# Patient Record
Sex: FEMALE | Race: WHITE | NOT HISPANIC OR LATINO | Employment: FULL TIME | URBAN - METROPOLITAN AREA
[De-identification: names, ages, dates, MRNs, and addresses within clinical notes are randomized per-mention and may not be internally consistent; named-entity substitution may affect disease eponyms.]

---

## 2017-11-28 ENCOUNTER — HOSPITAL ENCOUNTER (OUTPATIENT)
Dept: OTHER | Facility: HOSPITAL | Age: 37
Discharge: HOME | End: 2017-11-28

## 2023-08-17 ENCOUNTER — HOSPITAL ENCOUNTER (OUTPATIENT)
Dept: OTHER | Facility: HOSPITAL | Age: 43
Discharge: HOME | End: 2023-08-17

## 2023-08-29 ENCOUNTER — HOSPITAL ENCOUNTER (OUTPATIENT)
Dept: OTHER | Facility: HOSPITAL | Age: 43
Discharge: HOME | End: 2023-08-29

## 2023-10-12 ENCOUNTER — LAB (OUTPATIENT)
Dept: LAB | Facility: HOSPITAL | Age: 43
End: 2023-10-12
Payer: COMMERCIAL

## 2023-10-12 ENCOUNTER — OFFICE VISIT (OUTPATIENT)
Dept: FAMILY MEDICINE CLINIC | Facility: CLINIC | Age: 43
End: 2023-10-12
Payer: COMMERCIAL

## 2023-10-12 ENCOUNTER — HOSPITAL ENCOUNTER (OUTPATIENT)
Dept: GENERAL RADIOLOGY | Facility: HOSPITAL | Age: 43
Discharge: HOME | End: 2023-10-12
Payer: COMMERCIAL

## 2023-10-12 VITALS
SYSTOLIC BLOOD PRESSURE: 109 MMHG | WEIGHT: 122 LBS | DIASTOLIC BLOOD PRESSURE: 71 MMHG | HEART RATE: 82 BPM | HEIGHT: 62 IN | OXYGEN SATURATION: 99 % | BODY MASS INDEX: 22.45 KG/M2

## 2023-10-12 DIAGNOSIS — R35.0 URINE FREQUENCY: ICD-10-CM

## 2023-10-12 DIAGNOSIS — R10.84 GENERALIZED ABDOMINAL PAIN: ICD-10-CM

## 2023-10-12 DIAGNOSIS — K92.1 BLOODY STOOL: ICD-10-CM

## 2023-10-12 DIAGNOSIS — M54.40 LOW BACK PAIN WITH SCIATICA, SCIATICA LATERALITY UNSPECIFIED, UNSPECIFIED BACK PAIN LATERALITY, UNSPECIFIED CHRONICITY: ICD-10-CM

## 2023-10-12 DIAGNOSIS — R15.2 RECTAL URGENCY: ICD-10-CM

## 2023-10-12 DIAGNOSIS — K92.1 BLOODY STOOL: Primary | ICD-10-CM

## 2023-10-12 DIAGNOSIS — Z23 NEED FOR IMMUNIZATION AGAINST INFLUENZA: ICD-10-CM

## 2023-10-12 PROBLEM — E03.9 HYPOTHYROIDISM: Status: ACTIVE | Noted: 2023-10-12

## 2023-10-12 PROBLEM — Z85.42 HISTORY OF UTERINE CANCER: Status: ACTIVE | Noted: 2023-10-12

## 2023-10-12 PROBLEM — K21.9 GASTROESOPHAGEAL REFLUX DISEASE: Status: ACTIVE | Noted: 2023-10-12

## 2023-10-12 PROBLEM — J30.9 ALLERGIC RHINITIS: Status: ACTIVE | Noted: 2023-10-12

## 2023-10-12 LAB
ALBUMIN SERPL-MCNC: 4.3 G/DL (ref 3.5–5.2)
ALBUMIN/GLOB SERPL: 1.7 G/DL
ALP SERPL-CCNC: 104 U/L (ref 39–117)
ALT SERPL W P-5'-P-CCNC: 17 U/L (ref 1–33)
ANION GAP SERPL CALCULATED.3IONS-SCNC: 10 MMOL/L (ref 5–15)
AST SERPL-CCNC: 23 U/L (ref 1–32)
BASOPHILS # BLD AUTO: 0.06 10*3/MM3 (ref 0–0.2)
BASOPHILS NFR BLD AUTO: 1 % (ref 0–1.5)
BILIRUB BLD-MCNC: NEGATIVE MG/DL
BILIRUB SERPL-MCNC: 0.2 MG/DL (ref 0–1.2)
BUN SERPL-MCNC: 11 MG/DL (ref 6–20)
BUN/CREAT SERPL: 12.8 (ref 7–25)
CALCIUM SPEC-SCNC: 9.4 MG/DL (ref 8.6–10.5)
CHLORIDE SERPL-SCNC: 105 MMOL/L (ref 98–107)
CLARITY, POC: CLEAR
CO2 SERPL-SCNC: 26 MMOL/L (ref 22–29)
COLOR UR: YELLOW
CREAT SERPL-MCNC: 0.86 MG/DL (ref 0.57–1)
DEPRECATED RDW RBC AUTO: 40 FL (ref 37–54)
EGFRCR SERPLBLD CKD-EPI 2021: 85.6 ML/MIN/1.73
EOSINOPHIL # BLD AUTO: 0.23 10*3/MM3 (ref 0–0.4)
EOSINOPHIL NFR BLD AUTO: 3.7 % (ref 0.3–6.2)
ERYTHROCYTE [DISTWIDTH] IN BLOOD BY AUTOMATED COUNT: 12.1 % (ref 12.3–15.4)
EXPIRATION DATE: NORMAL
GLOBULIN UR ELPH-MCNC: 2.5 GM/DL
GLUCOSE SERPL-MCNC: 108 MG/DL (ref 65–99)
GLUCOSE UR STRIP-MCNC: NEGATIVE MG/DL
HCT VFR BLD AUTO: 36.9 % (ref 34–46.6)
HGB BLD-MCNC: 12.2 G/DL (ref 12–15.9)
IMM GRANULOCYTES # BLD AUTO: 0.01 10*3/MM3 (ref 0–0.05)
IMM GRANULOCYTES NFR BLD AUTO: 0.2 % (ref 0–0.5)
KETONES UR QL: NEGATIVE
LEUKOCYTE EST, POC: NEGATIVE
LYMPHOCYTES # BLD AUTO: 1.57 10*3/MM3 (ref 0.7–3.1)
LYMPHOCYTES NFR BLD AUTO: 25.4 % (ref 19.6–45.3)
Lab: NORMAL
MCH RBC QN AUTO: 29.9 PG (ref 26.6–33)
MCHC RBC AUTO-ENTMCNC: 33.1 G/DL (ref 31.5–35.7)
MCV RBC AUTO: 90.4 FL (ref 79–97)
MONOCYTES # BLD AUTO: 0.57 10*3/MM3 (ref 0.1–0.9)
MONOCYTES NFR BLD AUTO: 9.2 % (ref 5–12)
NEUTROPHILS NFR BLD AUTO: 3.75 10*3/MM3 (ref 1.7–7)
NEUTROPHILS NFR BLD AUTO: 60.5 % (ref 42.7–76)
NITRITE UR-MCNC: NEGATIVE MG/ML
NRBC BLD AUTO-RTO: 0 /100 WBC (ref 0–0.2)
PH UR: 6.5 [PH] (ref 5–8)
PLATELET # BLD AUTO: 276 10*3/MM3 (ref 140–450)
PMV BLD AUTO: 10.1 FL (ref 6–12)
POTASSIUM SERPL-SCNC: 3.7 MMOL/L (ref 3.5–5.2)
PROT SERPL-MCNC: 6.8 G/DL (ref 6–8.5)
PROT UR STRIP-MCNC: NEGATIVE MG/DL
RBC # BLD AUTO: 4.08 10*6/MM3 (ref 3.77–5.28)
RBC # UR STRIP: NEGATIVE /UL
SODIUM SERPL-SCNC: 141 MMOL/L (ref 136–145)
SP GR UR: 1.02 (ref 1–1.03)
UROBILINOGEN UR QL: NORMAL
WBC NRBC COR # BLD: 6.19 10*3/MM3 (ref 3.4–10.8)

## 2023-10-12 PROCEDURE — 36415 COLL VENOUS BLD VENIPUNCTURE: CPT

## 2023-10-12 PROCEDURE — 74018 RADEX ABDOMEN 1 VIEW: CPT

## 2023-10-12 PROCEDURE — 80053 COMPREHEN METABOLIC PANEL: CPT

## 2023-10-12 PROCEDURE — 85025 COMPLETE CBC W/AUTO DIFF WBC: CPT

## 2023-10-12 RX ORDER — LEVOTHYROXINE SODIUM 0.07 MG/1
75 TABLET ORAL
COMMUNITY
Start: 2023-08-04

## 2023-10-12 RX ORDER — LOPERAMIDE HYDROCHLORIDE 2 MG/1
2 CAPSULE ORAL 4 TIMES DAILY PRN
Qty: 30 CAPSULE | Refills: 0 | Status: SHIPPED | OUTPATIENT
Start: 2023-10-12 | End: 2023-10-12

## 2023-10-12 RX ORDER — DEXLANSOPRAZOLE 60 MG/1
60 CAPSULE, DELAYED RELEASE ORAL DAILY
COMMUNITY
Start: 2023-08-02

## 2023-10-12 RX ORDER — AZELASTINE HYDROCHLORIDE 137 UG/1
SPRAY, METERED NASAL 2 TIMES DAILY PRN
COMMUNITY
Start: 2023-08-04

## 2023-10-12 RX ORDER — ESTRADIOL 0.1 MG/G
2 CREAM VAGINAL DAILY
COMMUNITY

## 2023-10-12 RX ORDER — CELECOXIB 100 MG/1
100 CAPSULE ORAL 2 TIMES DAILY PRN
COMMUNITY

## 2023-10-12 RX ORDER — LEVOTHYROXINE SODIUM 0.05 MG/1
50 TABLET ORAL
COMMUNITY
Start: 2023-09-28

## 2023-10-12 RX ORDER — DICYCLOMINE HYDROCHLORIDE 10 MG/1
10 CAPSULE ORAL
Qty: 16 CAPSULE | Refills: 0 | Status: SHIPPED | OUTPATIENT
Start: 2023-10-12

## 2023-10-12 NOTE — PROGRESS NOTES
Patient XR normal. Would you mind to call patient and let her know?     Thank you.    ?  This document has been electronically signed by Tyrone Hagan MD on October 12, 2023 15:47 EDT

## 2023-10-12 NOTE — PROGRESS NOTES
Subjective:       Monika Gu is a 43 y.o. female with a concurrent medical history of hypothyroidism,, gastroesophageal reflux disease, and allergic rhinitis and a past medical history of uterine cancer (adenocarcinoma in situ) status post hysterectomy in 2019 and past surgical history also including appendectomy who presents to establish care.    Patient has just moved to the area and would like to establish care.    Concurrent medical history includes hypothyroidism.  She has been prescribed Synthroid since 2004.  She currently alternates between taking 50 mcg and 75 mcg (taking 50 mcg 2 times a week and then on the third day taking 75 mcg).  She has previously seen endocrinology and then this has been refilled by her previous PCP prior to her move here.  She says TSH was last checked in August and that it was within normal limits.  We will be working to obtain release of records for this patient.    Gastroesophageal reflux disease -patient says that this was refractory to treatment with both Protonix and Prilosec but symptoms have been well controlled with Dexilant which she has been on for almost a year.    Allergic rhinitis -patient has been prescribed azelastine and says she does not use it constantly and does have significant relief from allergies with this medication.    Patient also tells me she has recently been started on Celebrex for arthritic pains and that her last PCP ordered x-rays of the knees and hands that were negative for narrowing of joint spaces.  Again, we are working on obtaining release of records for this patient.  She says that she has been referred to rheumatology by her previous PCP and she is still waiting on this appointment.    Patient does voice concern to me that she has been having generalized abdominal pain that has woken her from sleep.  She says that this had also been a problem prior to her move here and she was tested for H. pylori by her previous provider with  negative results.  She says that she does feel some abdominal distention and pain. She also complains of rectal urgency and frequent passing of flatus but difficulty passing stool.  She has seen blood in her bowel movements and does show me a picture of bright red blood in the toilet.     Patient denies family history of colon cancer or inflammatory bowel disease including Crohn's or ulcerative colitis.  She denies history of smoking and rarely drinks alcohol.  She does tell me she has been treated with antibiotics multiple times over the course of the last year for urinary tract infections.  Urinalysis obtained in office today - negative for nitrite and leukocyte Estrace were negative.    The following portions of the patient's history were reviewed and updated as appropriate: allergies, current medications, past family history, past medical history, past social history, past surgical history, and problem list.    Past Medical Hx:  No past medical history on file.    Past Surgical Hx:  No past surgical history on file.    Current Meds:    Current Outpatient Medications:     Azelastine HCl 137 MCG/SPRAY solution, by Each Nare route 2 (Two) Times a Day As Needed. USE 2 SPRAYS PER NOSTRIL TWICE A DAY AS NEEDED, Disp: , Rfl:     dexlansoprazole (DEXILANT) 60 MG capsule, Take 1 capsule by mouth Daily., Disp: , Rfl:     levothyroxine (SYNTHROID, LEVOTHROID) 50 MCG tablet, Take 1 tablet by mouth Every Morning. PT TAKES 5 TIMES A WEEK, Disp: , Rfl:     levothyroxine (SYNTHROID, LEVOTHROID) 75 MCG tablet, Take 1 tablet by mouth Every Morning. PT TAKES ONLY 2 DAYS A WEEK, Disp: , Rfl:     celecoxib (CeleBREX) 100 MG capsule, Take 1 capsule by mouth 2 (Two) Times a Day As Needed for Mild Pain (FOR JOINT PAIN)., Disp: , Rfl:     dicyclomine (BENTYL) 10 MG capsule, Take 1 capsule by mouth 4 (Four) Times a Day Before Meals & at Bedtime., Disp: 16 capsule, Rfl: 0    Allergies:  No Known Allergies    Family Hx:  No family history  "on file.     Social History:  Social History     Socioeconomic History    Marital status: Single       Review of Systems  Review of Systems   Constitutional:  Negative for activity change, chills, fever and unexpected weight change (has had some lost some weight, but does not feel it is large amount).   Cardiovascular:  Negative for chest pain.   Gastrointestinal:  Positive for abdominal distention (bloating), abdominal pain (\"I can't seem to lay on my belly,\" no relationship to food, happens more in the evening) and blood in stool. Negative for nausea and vomiting. Diarrhea: patient complains of rectal urgency.  Neurological:  Positive for dizziness, weakness and light-headedness. Negative for syncope.       Objective:     /71   Pulse 82   Ht 157.5 cm (62\")   Wt 55.3 kg (122 lb)   SpO2 99%   BMI 22.31 kg/mý   Physical Exam  Constitutional:       General: She is not in acute distress.     Appearance: Normal appearance. She is not ill-appearing or toxic-appearing.   HENT:      Head: Normocephalic and atraumatic.   Eyes:      Extraocular Movements: Extraocular movements intact.   Cardiovascular:      Rate and Rhythm: Normal rate and regular rhythm.   Pulmonary:      Effort: Pulmonary effort is normal.      Breath sounds: Normal breath sounds.   Abdominal:      General: Abdomen is flat. Bowel sounds are normal. There is no distension.      Tenderness: There is abdominal tenderness (right upper quadrant, left upper and lower quadrants with deep palpation). There is no right CVA tenderness, left CVA tenderness, guarding or rebound.      Comments: Abdominal scarring from prior surgical procedures   Musculoskeletal:      Cervical back: Normal range of motion.   Neurological:      Mental Status: She is alert.   Psychiatric:         Mood and Affect: Mood normal.         Behavior: Behavior normal.          Assessment/Plan:     Diagnoses and all orders for this visit:    1. Bloody stool (Primary)      Patient does " voice concern to me that she has been having generalized abdominal pain that has woken her from sleep.  She says that this had also been a problem prior to her move here and she was tested for H. pylori by her previous provider with negative results.  She says that she does feel some abdominal distention and pain. She also complains of rectal urgency and frequent passing of flatus but difficulty passing stool.  She has seen blood in her bowel movements and does show me a picture of bright red blood in the toilet.     Patient denies family history of colon cancer or inflammatory bowel disease including Crohn's or ulcerative colitis.  She denies history of smoking and rarely drinks alcohol.  She does tell me she has been treated with antibiotics multiple times over the course of the last year for urinary tract infections.  Urinalysis obtained in office today - negative for nitrite and leukocyte Estrace were negative.    Given passage of blood in stool, will obtain CBC to rule out anemia as well as a CMP to look at liver and kidney function. Will obtain stool occult blood, and given history of multiple antibiotic use with prolonged abdominal pain, rectal urgency, and passage of blood per rectum, will test for C. Difficile at this time.     Although patient is 44, I do believe she would benefit from colonoscopy as the differential diagnosis for bright red blood per rectum would include diverticulosis or even colonic process. Given her history of uterine cancer and multiple surgeries, will also obtain Abdominal XR.     Ulcer less likely in patient with chronic PPI use and no history of smoking and limited alcohol use.     We initially discussed the potential for symptomatic treatment with loperamide, but given nature of her symptoms and any suspicion for obstructive process or infection, would avoid for now. Elected to use bentyl instead as its antispasmodic properties may benefit patient's symptoms.     Will follow up  in two weeks time. We discussed reasons to seek urgent treatment, such as if patient develops symptoms of massive blood loss.    -     Occult Blood X 1, Stool - Stool, Per Rectum; Future  -     CBC w AUTO Differential; Future  -     Comprehensive metabolic panel; Future  -     Ambulatory Referral For Screening Colonoscopy  -     Clostridioides difficile Toxin, PCR - Stool, Per Rectum; Future    2. Need for immunization against influenza    3. Low back pain with sciatica, sciatica laterality unspecified, unspecified back pain laterality, unspecified chronicity      -     POCT urinalysis dipstick, automated    4. Urine frequency      -     POCT urinalysis dipstick, automated    5. Generalized abdominal pain      -     XR Abdomen Flat & Upright (In Office)    6. Rectal urgency    -     Bentyl           Rx changes: Prescribed short course of bentyl       Follow-up:     Return in about 2 weeks (around 10/26/2023) for Recheck.    Preventative:  Health Maintenance   Topic Date Due    COVID-19 Vaccine (1) Never done    HEPATITIS C SCREENING  Never done    ANNUAL PHYSICAL  Never done    INFLUENZA VACCINE  10/13/2023 (Originally 8/1/2023)    TDAP/TD VACCINES (3 - Tdap) 09/29/2033    Pneumococcal Vaccine 0-64  Aged Out         This document has been electronically signed by Tyrone Hagan MD on October 12, 2023 13:56 EDT       Parts of this note are electronic transcriptions/translations of spoken language to printed text using the Dragon Dictation system.

## 2023-10-13 ENCOUNTER — PATIENT ROUNDING (BHMG ONLY) (OUTPATIENT)
Dept: FAMILY MEDICINE CLINIC | Facility: CLINIC | Age: 43
End: 2023-10-13
Payer: COMMERCIAL

## 2023-10-13 ENCOUNTER — LAB (OUTPATIENT)
Dept: LAB | Facility: HOSPITAL | Age: 43
End: 2023-10-13
Payer: COMMERCIAL

## 2023-10-13 DIAGNOSIS — K92.1 BLOODY STOOL: Primary | ICD-10-CM

## 2023-10-13 LAB
027 TOXIN: NORMAL
C DIFF TOX GENS STL QL NAA+PROBE: NEGATIVE
HEMOCCULT STL QL IA: NEGATIVE

## 2023-10-13 PROCEDURE — 87493 C DIFF AMPLIFIED PROBE: CPT

## 2023-10-13 PROCEDURE — 82274 ASSAY TEST FOR BLOOD FECAL: CPT

## 2023-10-13 NOTE — PROGRESS NOTES
Please let patient know that CBC showed no anemia and electrolytes, liver enzymes, and renal function was within normal limits.    ?  This document has been electronically signed by Tyrone Hagan MD on October 13, 2023 07:46 EDT

## 2023-10-13 NOTE — PROGRESS NOTES
Patient occult blood negative for blood in stool. Stool sample also negative for C. Diff. Please let patient know of lab results at your convenience.    ?  This document has been electronically signed by Tyrone Hagan MD on October 13, 2023 12:49 EDT

## 2023-10-16 ENCOUNTER — TELEPHONE (OUTPATIENT)
Dept: FAMILY MEDICINE CLINIC | Facility: CLINIC | Age: 43
End: 2023-10-16

## 2023-10-18 ENCOUNTER — TELEPHONE (OUTPATIENT)
Dept: FAMILY MEDICINE CLINIC | Facility: CLINIC | Age: 43
End: 2023-10-18

## 2023-10-18 NOTE — TELEPHONE ENCOUNTER
Patient is requesting to be called regarding referrals for GASTROENTEROLOGY, RHEUMATOLOGY. PT states [facility] office is booked too far out and would like to be seen at [facility] if possible.     Monika:  [phone]

## 2023-10-26 ENCOUNTER — HOSPITAL ENCOUNTER (EMERGENCY)
Facility: HOSPITAL | Age: 43
Discharge: HOME | End: 2023-10-26
Attending: EMERGENCY MEDICINE
Payer: OTHER MISCELLANEOUS

## 2023-10-26 ENCOUNTER — APPOINTMENT (OUTPATIENT)
Dept: CT IMAGING | Facility: HOSPITAL | Age: 43
End: 2023-10-26
Payer: OTHER MISCELLANEOUS

## 2023-10-26 VITALS
HEIGHT: 62 IN | HEART RATE: 83 BPM | RESPIRATION RATE: 20 BRPM | TEMPERATURE: 97.5 F | OXYGEN SATURATION: 99 % | SYSTOLIC BLOOD PRESSURE: 120 MMHG | DIASTOLIC BLOOD PRESSURE: 80 MMHG | WEIGHT: 124.34 LBS | BODY MASS INDEX: 22.88 KG/M2

## 2023-10-26 DIAGNOSIS — R51.9 NONINTRACTABLE HEADACHE, UNSPECIFIED CHRONICITY PATTERN, UNSPECIFIED HEADACHE TYPE: ICD-10-CM

## 2023-10-26 DIAGNOSIS — S06.0X0A CONCUSSION WITHOUT LOSS OF CONSCIOUSNESS, INITIAL ENCOUNTER: Primary | ICD-10-CM

## 2023-10-26 PROCEDURE — 25010000002 KETOROLAC TROMETHAMINE PER 15 MG: Performed by: EMERGENCY MEDICINE

## 2023-10-26 PROCEDURE — 25010000002 DIPHENHYDRAMINE PER 50 MG: Performed by: EMERGENCY MEDICINE

## 2023-10-26 PROCEDURE — 99284 EMERGENCY DEPT VISIT MOD MDM: CPT

## 2023-10-26 PROCEDURE — 96375 TX/PRO/DX INJ NEW DRUG ADDON: CPT

## 2023-10-26 PROCEDURE — 70450 CT HEAD/BRAIN W/O DYE: CPT

## 2023-10-26 PROCEDURE — 25010000002 DEXAMETHASONE SODIUM PHOSPHATE 10 MG/ML SOLUTION: Performed by: EMERGENCY MEDICINE

## 2023-10-26 PROCEDURE — 96374 THER/PROPH/DIAG INJ IV PUSH: CPT

## 2023-10-26 PROCEDURE — 25810000003 SODIUM CHLORIDE 0.9 % SOLUTION: Performed by: EMERGENCY MEDICINE

## 2023-10-26 PROCEDURE — 25010000002 METOCLOPRAMIDE PER 10 MG: Performed by: EMERGENCY MEDICINE

## 2023-10-26 RX ORDER — KETOROLAC TROMETHAMINE 15 MG/ML
30 INJECTION, SOLUTION INTRAMUSCULAR; INTRAVENOUS ONCE
Status: COMPLETED | OUTPATIENT
Start: 2023-10-26 | End: 2023-10-26

## 2023-10-26 RX ORDER — DEXAMETHASONE SODIUM PHOSPHATE 10 MG/ML
8 INJECTION, SOLUTION INTRAMUSCULAR; INTRAVENOUS ONCE
Status: COMPLETED | OUTPATIENT
Start: 2023-10-26 | End: 2023-10-26

## 2023-10-26 RX ORDER — METOCLOPRAMIDE HYDROCHLORIDE 5 MG/ML
10 INJECTION INTRAMUSCULAR; INTRAVENOUS ONCE
Status: COMPLETED | OUTPATIENT
Start: 2023-10-26 | End: 2023-10-26

## 2023-10-26 RX ORDER — DIPHENHYDRAMINE HYDROCHLORIDE 50 MG/ML
50 INJECTION INTRAMUSCULAR; INTRAVENOUS ONCE
Status: COMPLETED | OUTPATIENT
Start: 2023-10-26 | End: 2023-10-26

## 2023-10-26 RX ADMIN — DIPHENHYDRAMINE HYDROCHLORIDE 50 MG: 50 INJECTION, SOLUTION INTRAMUSCULAR; INTRAVENOUS at 13:07

## 2023-10-26 RX ADMIN — METOCLOPRAMIDE HYDROCHLORIDE 10 MG: 5 INJECTION INTRAMUSCULAR; INTRAVENOUS at 13:07

## 2023-10-26 RX ADMIN — KETOROLAC TROMETHAMINE 30 MG: 15 INJECTION, SOLUTION INTRAMUSCULAR; INTRAVENOUS at 13:06

## 2023-10-26 RX ADMIN — SODIUM CHLORIDE 1000 ML: 9 INJECTION, SOLUTION INTRAVENOUS at 13:06

## 2023-10-26 RX ADMIN — DEXAMETHASONE SODIUM PHOSPHATE 8 MG: 10 INJECTION INTRAMUSCULAR; INTRAVENOUS at 13:07

## 2023-10-26 NOTE — Clinical Note
[facility] EMERGENCY ROOM  [address]  [place] [address]  Phone: [phone]    Monika Gu was seen and treated in our emergency department on 10/26/2023.  She may return to work on 10/27/2023.         Thank you for choosing [facility].    Chris Grimes MD

## 2023-10-26 NOTE — Clinical Note
[facility] EMERGENCY ROOM  [address]  [place] [address]  Phone: [phone]    Monika Gu was seen and treated in our emergency department on 10/26/2023.  She may return to work on 10/30/2023.         Thank you for choosing [facility].    Chris Grimes MD
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
## 2023-10-26 NOTE — Clinical Note
[facility] EMERGENCY ROOM  [address]  [place] [address]  Phone: [phone]    Monika Gu was seen and treated in our emergency department on 10/26/2023.  She may return to work on 10/27/2023.         Thank you for choosing [facility].    Chris Grimes MD
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
## 2023-10-30 ENCOUNTER — OFFICE VISIT (OUTPATIENT)
Dept: FAMILY MEDICINE CLINIC | Facility: CLINIC | Age: 43
End: 2023-10-30
Payer: COMMERCIAL

## 2023-10-30 VITALS
DIASTOLIC BLOOD PRESSURE: 80 MMHG | HEIGHT: 62 IN | WEIGHT: 124.2 LBS | BODY MASS INDEX: 22.86 KG/M2 | SYSTOLIC BLOOD PRESSURE: 100 MMHG

## 2023-10-30 DIAGNOSIS — G43.011 INTRACTABLE MIGRAINE WITHOUT AURA AND WITH STATUS MIGRAINOSUS: ICD-10-CM

## 2023-10-30 DIAGNOSIS — S06.0X0A CONCUSSION WITHOUT LOSS OF CONSCIOUSNESS, INITIAL ENCOUNTER: Primary | ICD-10-CM

## 2023-10-30 DIAGNOSIS — R11.0 NAUSEA: ICD-10-CM

## 2023-10-30 DIAGNOSIS — Z12.11 COLON CANCER SCREENING: ICD-10-CM

## 2023-10-30 RX ORDER — ONDANSETRON 4 MG/1
4 TABLET, FILM COATED ORAL EVERY 8 HOURS PRN
Qty: 14 TABLET | Refills: 0 | Status: SHIPPED | OUTPATIENT
Start: 2023-10-30

## 2023-10-30 RX ORDER — SUMATRIPTAN 25 MG/1
TABLET, FILM COATED ORAL
Qty: 14 TABLET | Refills: 0 | Status: SHIPPED | OUTPATIENT
Start: 2023-10-30

## 2023-10-30 NOTE — LETTER
October 30, 2023     Patient: Monika Gu   YOB: 1980   Date of Visit: 10/30/2023       To Whom It May Concern:    It is my medical opinion that Monika Gu should be excused from work until her next appointment with me, next week.          Sincerely,        Tyrone Hagan MD    CC: No Recipients
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
## 2023-11-01 ENCOUNTER — TELEPHONE (OUTPATIENT)
Dept: FAMILY MEDICINE CLINIC | Facility: CLINIC | Age: 43
End: 2023-11-01
Payer: COMMERCIAL

## 2023-11-01 NOTE — TELEPHONE ENCOUNTER
Patient called and stated she feels a hard lump on her head and she is concerned. Informed PT Dr. Hagan was not in today 11/1/2023 but could possibly work in with another provider. PT stated that she was only comfortable with seeing Dr. Hagan but if the lump started to hurt she would go to ER.   PT is requesting to be called to discuss this.  Encouraged PT to go to ER if symptoms escalate.   PT verbalized understanding.    Monika:    [phone]

## 2023-11-01 NOTE — TELEPHONE ENCOUNTER
spoke w pt and let her know that we would ask Dr. Hagan Thursday morning if he thought she needed to be seen sonner than 11/6/23. Advised pt if she started feeling worse to go to ED. Pt acknowledged and aware

## 2023-11-06 ENCOUNTER — TELEPHONE (OUTPATIENT)
Dept: FAMILY MEDICINE CLINIC | Facility: CLINIC | Age: 43
End: 2023-11-06
Payer: COMMERCIAL

## 2023-11-06 ENCOUNTER — OFFICE VISIT (OUTPATIENT)
Dept: FAMILY MEDICINE CLINIC | Facility: CLINIC | Age: 43
End: 2023-11-06
Payer: OTHER MISCELLANEOUS

## 2023-11-06 VITALS
BODY MASS INDEX: 22.63 KG/M2 | HEART RATE: 79 BPM | OXYGEN SATURATION: 99 % | DIASTOLIC BLOOD PRESSURE: 64 MMHG | WEIGHT: 123 LBS | SYSTOLIC BLOOD PRESSURE: 115 MMHG | HEIGHT: 62 IN

## 2023-11-06 DIAGNOSIS — S06.0X0S CONCUSSION WITHOUT LOSS OF CONSCIOUSNESS, SEQUELA: Primary | ICD-10-CM

## 2023-11-06 NOTE — TELEPHONE ENCOUNTER
HUB STAFF ATTEMPTED TO FOLLOW WARM TRANSFER PROCESS AND WAS UNSUCCESSFUL.  Caller: Monika Gu    Relationship: Self    Best call back number: [phone]     What is the best time to reach you: ANY    Who are you requesting to speak with (clinical staff, provider,  specific staff member): LEXI OR CLINICAL TEAM    What was the call regarding: PPATIENT STATED: RETURNING CALL AND REQUESTING A CALL BACK REGARDING IMAGING THAT SHE BROUGHT IN ABOUT HER HANDS, KNEES AND BREAST IMAGING.

## 2023-11-06 NOTE — TELEPHONE ENCOUNTER
PT HAD BROUGHT IN XRAY IMAGING ON RIGHT AND LEFT HAND ALSO XRAY IMAGING ON RIGHT AND LEFT KNEE. PT ALSO BROUGHT IN MAMMOGRAM SCREENING. PER PCP PT TEST RESULT ARE RECOMMENDING FURTHER IMAGING ON PT LEFT BREAST PCP WOULD LIKE TO KNOW IF PT WAS EVER TOLD THIS BY ORDERING PROVIDER OR IF SHE HAS HAD FURTHER IMAGING DONE. CALLED AND LEFT PT MESSAGE TO  CALL BACK.
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
## 2023-11-13 ENCOUNTER — OFFICE VISIT (OUTPATIENT)
Dept: FAMILY MEDICINE CLINIC | Facility: CLINIC | Age: 43
End: 2023-11-13
Payer: OTHER MISCELLANEOUS

## 2023-11-13 VITALS
HEART RATE: 73 BPM | WEIGHT: 125 LBS | DIASTOLIC BLOOD PRESSURE: 68 MMHG | OXYGEN SATURATION: 99 % | SYSTOLIC BLOOD PRESSURE: 96 MMHG | HEIGHT: 62 IN | BODY MASS INDEX: 23 KG/M2

## 2023-11-13 DIAGNOSIS — M79.641 BILATERAL HAND PAIN: ICD-10-CM

## 2023-11-13 DIAGNOSIS — R11.0 NAUSEA: ICD-10-CM

## 2023-11-13 DIAGNOSIS — M79.642 BILATERAL HAND PAIN: ICD-10-CM

## 2023-11-13 DIAGNOSIS — S06.0X0S CONCUSSION WITHOUT LOSS OF CONSCIOUSNESS, SEQUELA: Primary | ICD-10-CM

## 2023-11-13 RX ORDER — ONDANSETRON 4 MG/1
4 TABLET, FILM COATED ORAL EVERY 8 HOURS PRN
Qty: 30 TABLET | Refills: 0 | Status: SHIPPED | OUTPATIENT
Start: 2023-11-13
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
## 2023-11-14 ENCOUNTER — TELEPHONE (OUTPATIENT)
Dept: FAMILY MEDICINE CLINIC | Facility: CLINIC | Age: 43
End: 2023-11-14
Payer: COMMERCIAL

## 2023-11-14 NOTE — TELEPHONE ENCOUNTER
Caller: Monika Gu    Relationship: Self    Best call back number: [phone]     What form or medical record are you requesting: WORK EXCUSE      Additional notes: PATIENT REPORTS THAT Tyrone Hagan MD OFFERED THE FULL WEEK TO BE OFF WORK  -    PATIENT REQUESTS CALL BACK TO DISCUSS WHAT DATES ARE NEEDED.

## 2023-11-30 ENCOUNTER — TELEPHONE (OUTPATIENT)
Dept: FAMILY MEDICINE CLINIC | Facility: CLINIC | Age: 43
End: 2023-11-30
Payer: COMMERCIAL

## 2023-12-01 ENCOUNTER — OFFICE VISIT (OUTPATIENT)
Dept: FAMILY MEDICINE CLINIC | Facility: CLINIC | Age: 43
End: 2023-12-01
Payer: OTHER MISCELLANEOUS

## 2023-12-01 VITALS
SYSTOLIC BLOOD PRESSURE: 115 MMHG | WEIGHT: 123 LBS | DIASTOLIC BLOOD PRESSURE: 80 MMHG | OXYGEN SATURATION: 99 % | HEIGHT: 62 IN | BODY MASS INDEX: 22.63 KG/M2 | HEART RATE: 84 BPM

## 2023-12-01 DIAGNOSIS — S06.0X0S CONCUSSION WITHOUT LOSS OF CONSCIOUSNESS, SEQUELA: Primary | ICD-10-CM

## 2023-12-01 DIAGNOSIS — G43.011 INTRACTABLE MIGRAINE WITHOUT AURA AND WITH STATUS MIGRAINOSUS: ICD-10-CM

## 2023-12-01 RX ORDER — SUMATRIPTAN 25 MG/1
TABLET, FILM COATED ORAL
Qty: 30 TABLET | Refills: 0 | Status: SHIPPED | OUTPATIENT
Start: 2023-12-01

## 2023-12-01 NOTE — Clinical Note
December 1, 2023     Patient: Monika Gu   YOB: 1980   Date of Visit: 12/1/2023       To Whom It May Concern:    It is my medical opinion that Monika Gu may return to work in two days.         Sincerely,        Tyrone Hagan MD    CC: No Recipients
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
## 2023-12-14 ENCOUNTER — TELEPHONE (OUTPATIENT)
Dept: NEUROLOGY | Facility: OTHER | Age: 43
End: 2023-12-14

## 2023-12-14 NOTE — TELEPHONE ENCOUNTER
Caller: [facility]    Relationship: REFERRING PROVIDER OFFICE    Best call back number: [phone] ([employer]'S DIRECT LINE)    Any additional details:   REFERRAL WAS CREATED ON 12-1-23 AND WAS PUT ON SCHEDULING REVIEW ON 12-6-23. REFERRING OFFICE IS ASKING IF PT CAN BE SCHEDULED.

## 2023-12-22 ENCOUNTER — TELEPHONE (OUTPATIENT)
Dept: GASTROENTEROLOGY | Facility: CLINIC | Age: 43
End: 2023-12-22
Payer: COMMERCIAL

## 2023-12-22 RX ORDER — LEVOTHYROXINE SODIUM 0.05 MG/1
TABLET ORAL
Qty: 24 TABLET | Refills: 1 | Status: SHIPPED | OUTPATIENT
Start: 2023-12-22

## 2023-12-22 NOTE — TELEPHONE ENCOUNTER
Contacted the patient and verified information and advised that I was calling to offer an earlier appointment than the one scheduled for 02/07/24 with Judi Rico. Patient accepted the sooner appointment with Dr. Manriquez on 12/29/23.

## 2023-12-27 ENCOUNTER — OFFICE VISIT (OUTPATIENT)
Dept: NEUROLOGY | Facility: CLINIC | Age: 43
End: 2023-12-27
Payer: COMMERCIAL

## 2023-12-27 ENCOUNTER — PATIENT ROUNDING (BHMG ONLY) (OUTPATIENT)
Dept: NEUROLOGY | Facility: CLINIC | Age: 43
End: 2023-12-27
Payer: COMMERCIAL

## 2023-12-27 VITALS
OXYGEN SATURATION: 98 % | WEIGHT: 125 LBS | HEART RATE: 71 BPM | DIASTOLIC BLOOD PRESSURE: 72 MMHG | HEIGHT: 62 IN | SYSTOLIC BLOOD PRESSURE: 130 MMHG | BODY MASS INDEX: 23 KG/M2

## 2023-12-27 DIAGNOSIS — F07.81 POST CONCUSSIVE SYNDROME: Primary | ICD-10-CM

## 2023-12-27 RX ORDER — AMITRIPTYLINE HYDROCHLORIDE 10 MG/1
10 TABLET, FILM COATED ORAL NIGHTLY
Qty: 30 TABLET | Refills: 2 | Status: SHIPPED | OUTPATIENT
Start: 2023-12-27
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
## 2023-12-28 ENCOUNTER — TELEPHONE (OUTPATIENT)
Dept: NEUROLOGY | Facility: CLINIC | Age: 43
End: 2023-12-28

## 2023-12-28 NOTE — TELEPHONE ENCOUNTER
Caller: Monika Gu    Relationship: Self    Best call back number: [phone]     What is the best time to reach you: ANY    Who are you requesting to speak with (clinical staff, provider,  specific staff member): PROVIDER    What was the call regarding:TELEPHONED TO DISCUSS ONB INJECTIONS. SHE WANTS TO KNOW IF SHE HAS TO COME TO THE OFFICE HERE IN [place]  TO RECIEVE THE IN[facility].    PLEASE CALL & ADVISE-THANK YOU     Is it okay if the provider responds through [employer]: YES

## 2023-12-29 ENCOUNTER — TELEPHONE (OUTPATIENT)
Dept: FAMILY MEDICINE CLINIC | Facility: CLINIC | Age: 43
End: 2023-12-29
Payer: COMMERCIAL

## 2023-12-29 ENCOUNTER — OFFICE VISIT (OUTPATIENT)
Dept: GASTROENTEROLOGY | Facility: CLINIC | Age: 43
End: 2023-12-29
Payer: COMMERCIAL

## 2023-12-29 VITALS
HEIGHT: 62 IN | HEART RATE: 85 BPM | WEIGHT: 128.8 LBS | DIASTOLIC BLOOD PRESSURE: 66 MMHG | BODY MASS INDEX: 23.7 KG/M2 | SYSTOLIC BLOOD PRESSURE: 110 MMHG

## 2023-12-29 DIAGNOSIS — R10.30 LOWER ABDOMINAL PAIN: ICD-10-CM

## 2023-12-29 DIAGNOSIS — R15.2 FECAL URGENCY: ICD-10-CM

## 2023-12-29 DIAGNOSIS — K21.9 GASTROESOPHAGEAL REFLUX DISEASE, UNSPECIFIED WHETHER ESOPHAGITIS PRESENT: ICD-10-CM

## 2023-12-29 DIAGNOSIS — K92.1 BLOOD IN STOOL: Primary | ICD-10-CM

## 2023-12-29 DIAGNOSIS — R19.5 CHANGE IN CONSISTENCY OF STOOL: ICD-10-CM

## 2023-12-29 DIAGNOSIS — K59.09 CHRONIC CONSTIPATION: ICD-10-CM

## 2023-12-29 RX ORDER — BISACODYL 5 MG/1
20 TABLET, DELAYED RELEASE ORAL ONCE
Qty: 4 TABLET | Refills: 0 | Status: SHIPPED | OUTPATIENT
Start: 2023-12-29 | End: 2023-12-29

## 2023-12-29 RX ORDER — ALUMINUM ZIRCONIUM OCTACHLOROHYDREX GLY 16 G/100G
GEL TOPICAL 3 TIMES DAILY
Qty: 90 PACKET | Refills: 12 | Status: SHIPPED | OUTPATIENT
Start: 2023-12-29 | End: 2024-12-28

## 2023-12-29 NOTE — TELEPHONE ENCOUNTER
Patient called needing to speak with nurse regarding medication amitriptyline that was prescribed to her to help her sleep. Patient states it is causing her to be extremely emotional. Patient states that from yesterdays really good, elevated mood, she has did a completed 180 and is concerned. Patient states she feels as if she has this pit in her stomach that she can't get rid of and wants a MA to discuss side effects of medication with her. Please give call back at soonest convenience

## 2023-12-29 NOTE — PATIENT INSTRUCTIONS
She should drink plenty of water  Sit on squatty potty for defecation  Avoid prolonged sitting for defecation  Should take high-fiber diet and GERD diet and lifestyle modifications as we discussed  Will perform EGD and colonoscopy.  Should see the gynecologist to evaluate for scar tissue from history of endometriosis.    Will consider for CT enterography if workup noted to be negative
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
## 2024-01-02 ENCOUNTER — OFFICE VISIT (OUTPATIENT)
Dept: FAMILY MEDICINE CLINIC | Facility: CLINIC | Age: 44
End: 2024-01-02
Payer: OTHER MISCELLANEOUS

## 2024-01-02 ENCOUNTER — TELEPHONE (OUTPATIENT)
Dept: NEUROLOGY | Facility: CLINIC | Age: 44
End: 2024-01-02

## 2024-01-02 VITALS
WEIGHT: 125 LBS | SYSTOLIC BLOOD PRESSURE: 115 MMHG | BODY MASS INDEX: 23 KG/M2 | OXYGEN SATURATION: 96 % | HEART RATE: 105 BPM | DIASTOLIC BLOOD PRESSURE: 82 MMHG | HEIGHT: 62 IN

## 2024-01-02 DIAGNOSIS — F07.81 POST CONCUSSION SYNDROME: Primary | ICD-10-CM

## 2024-01-02 DIAGNOSIS — R00.2 PALPITATIONS: ICD-10-CM

## 2024-01-02 DIAGNOSIS — F41.9 ANXIETY: ICD-10-CM

## 2024-01-02 NOTE — TELEPHONE ENCOUNTER
"Provider: CHUYITA    Caller: PATIENT    Relationship to Patient: SELF    Pharmacy: [facility]    Phone Number: [phone]    Reason for Call: PROVIDER HAD STARTED PT ON AMITRIPTYLINE ON HER LAST VISIT ON 12/27/23.  YESTERDAY PT STARTED HAVING A RAPID HEART  AND ALSO THIS  MORNING.  PT ALSO STATES HER ARMS AND LEGS FELT WEIGHTLESS, FELT FUNNY, SHE CAN'T REALLY EXPLAIN IT.  PT ALSO STATES SHE FELT \"SPACED OUT\".  PT SAW HER PCP THIS MORNING AND HER HEART RATE WAS ELEVATED.  HER EKG WAS NL WITH JUST A FAST HEART RATE AND PALPITATIONS.  PT DOES NOT WANT TO CONTINUE THIS MEDICATION.  PT ALSO TAKES LEVOTHYROXINE AND HER PCP TOLD HER THE AMITRIPTYLINE CAN CAUSE CARDIAC SYMPTOMS WHEN TAKEN WITH THE LEVOTHYROXINE.  PLEASE CALL TO ADVISE     THANK YOU   "

## 2024-01-02 NOTE — PROGRESS NOTES
Subjective:       Monika Gu is a 43 y.o. female who presents for follow-up for postconcussion syndrome.    Please see my prior notes for further detail.  In summary, I have been following her for concussion since 10/30/2023.  Ms. Gu was seen in our emergency department on 10/26/2023 and diagnosed with a concussion.  CT of the head was obtained at that ED encounter and demonstrated no acute intracranial pathology.    I then followed her as she attempted to recover from the concussion.  Risk factors for delay in her recovery included history of prior concussion and history of migraine headaches as well as her age of 43.    I had administered the SCAT-5 questionnaire at each encounter and she began to have slow improvement in symptoms with scores that improved from 68 to 36 to 28 over the course of the month as we gradually increase her activity level.  However, when we attempted to return her slowly to her work duties, her symptoms significantly increased and her SCAT-5 score increased to 39 from 28.  Because it had taken a month at that point for her to improve, I offered her the option of referral to sports medicine or neurology for further evaluation.  She expressed a preference to see a neurologist.    Ms. Gu saw neurologist Dr. Loyd on 12/27/2023.  I have reviewed the office note from that encounter.  She was started on amitriptyline 10 mg and referred to speech therapy and physical therapy for vestibular exercises.  She has follow-up with neurology in 3 months.    Today, Ms. Gu complains of since starting the medication.  EKG was obtained that demonstrated normal sinus rhythm.  We discussed that palpitations are a well-known side effect of amitriptyline.  She is on levothyroxine for hypothyroidism and levothyroxine can sometimes increase amitriptyline levels.  I recommended she reach out to her neurologist to discuss these side effects.     Ms. Gu also tells me she would like the  opinion of sports medicine at this time and I will place a referral at her request.    The following portions of the patient's history were reviewed and updated as appropriate: allergies, current medications, past family history, past medical history, past social history, past surgical history, and problem list.    Past Medical Hx:  Past Medical History:   Diagnosis Date    Allergic     Arthritis     Concussion 10/2023    L FACIAL INJURY foot    Endometriosis     GERD (gastroesophageal reflux disease)     Headache     History of medical problems     Hyperthyroidism        Past Surgical Hx:  Past Surgical History:   Procedure Laterality Date    APPENDECTOMY      HYSTERECTOMY      OOPHORECTOMY  2019    ADENOCARCINOMA    TONSILLECTOMY         Current Meds:    Current Outpatient Medications:     amitriptyline (ELAVIL) 10 MG tablet, Take 1 tablet by mouth Every Night., Disp: 30 tablet, Rfl: 2    Azelastine HCl 137 MCG/SPRAY solution, by Each Nare route 2 (Two) Times a Day As Needed. USE 2 SPRAYS PER NOSTRIL TWICE A DAY AS NEEDED, Disp: , Rfl:     celecoxib (CeleBREX) 100 MG capsule, Take 1 capsule by mouth 2 (Two) Times a Day As Needed for Mild Pain (FOR JOINT PAIN)., Disp: , Rfl:     dexlansoprazole (DEXILANT) 60 MG capsule, Take 1 capsule by mouth Daily., Disp: , Rfl:     dicyclomine (BENTYL) 10 MG capsule, Take 1 capsule by mouth 4 (Four) Times a Day Before Meals & at Bedtime. (Patient not taking: Reported on 12/29/2023), Disp: 16 capsule, Rfl: 0    estradiol (ESTRACE) 0.1 MG/GM vaginal cream, Insert 2 applicators into the vagina Daily., Disp: , Rfl:     levothyroxine (SYNTHROID, LEVOTHROID) 50 MCG tablet, TAKE 1 TABLET BY MOUTH IN THE MORNING 2 DAYS WEEKS, Disp: 24 tablet, Rfl: 1    levothyroxine (SYNTHROID, LEVOTHROID) 75 MCG tablet, Take 1 tablet by mouth Every Morning. PT TAKES ONLY 2 DAYS A WEEK, Disp: , Rfl:     ondansetron (Zofran) 4 MG tablet, Take 1 tablet by mouth Every 8 (Eight) Hours As Needed for Nausea  "or Vomiting., Disp: 30 tablet, Rfl: 0    psyllium (Metamucil Smooth Texture) 58.6 % powder, Take  by mouth 3 (Three) Times a Day., Disp: 90 packet, Rfl: 12    Allergies:  No Known Allergies    Family Hx:  Family History   Problem Relation Age of Onset    Diabetes Father     Diabetes Brother     Cancer Maternal Aunt     Colon cancer Neg Hx         Social History:  Social History     Socioeconomic History    Marital status: Single   Tobacco Use    Smoking status: Never     Passive exposure: Never    Smokeless tobacco: Never   Vaping Use    Vaping Use: Never used   Substance and Sexual Activity    Alcohol use: Not Currently     Alcohol/week: 1.0 standard drink of alcohol     Types: 1 Shots of liquor per week    Drug use: Never    Sexual activity: Defer       Review of Systems  Review of Systems   Cardiovascular:  Positive for palpitations.   Neurological:  Positive for headaches.   Psychiatric/Behavioral:  Positive for decreased concentration and dysphoric mood. The patient is nervous/anxious.        Objective:     /82   Pulse 105   Ht 157.5 cm (62\")   Wt 56.7 kg (125 lb)   SpO2 96%   BMI 22.86 kg/m²   Physical Exam  Constitutional:       General: She is not in acute distress.     Appearance: Normal appearance. She is not ill-appearing, toxic-appearing or diaphoretic.   Eyes:      Extraocular Movements: Extraocular movements intact.   Pulmonary:      Effort: Pulmonary effort is normal. No respiratory distress.   Neurological:      Mental Status: She is alert.   Psychiatric:         Mood and Affect: Mood normal.         Behavior: Behavior normal.          Assessment/Plan:     Diagnoses and all orders for this visit:    1. Post concussion syndrome (Primary)    Please see my prior notes for further detail.  In summary, I have been following her for concussion since 10/30/2023.  Ms. Gu was seen in our emergency department on 10/26/2023 and diagnosed with a concussion.  CT of the head was obtained at that ED " encounter and demonstrated no acute intracranial pathology.    I then followed her as she attempted to recover from the concussion.  Risk factors for delay in her recovery included history of prior concussion and history of migraine headaches as well as her age of 43.    I had administered the SCAT-5 questionnaire at each encounter and she began to have slow improvement in symptoms with scores that improved from 68 to 36 to 28 over the course of the month as we gradually increase her activity level.  However, when we attempted to return her slowly to her work duties, her symptoms significantly increased and her SCAT-5 score increased to 39 from 28.  Because it had taken a month at that point for her to improve, I offered her the option of referral to sports medicine or neurology for further evaluation.  She expressed a preference to see a neurologist.    Ms. Gu saw neurologist Dr. Loyd on 12/27/2023.  I have reviewed the office note from that encounter.  She was started on amitriptyline 10 mg and referred to speech therapy and physical therapy for vestibular exercises.  She has follow-up with neurology in 3 months.    Today, Ms. Gu complains of since starting the medication.  EKG was obtained that demonstrated normal sinus rhythm.  We discussed that palpitations are a well-known side effect of amitriptyline.  She is on levothyroxine for hypothyroidism and levothyroxine can sometimes increase amitriptyline levels.  I recommended she reach out to her neurologist to discuss these side effects.     Ms. Gu also tells me she would like the opinion of sports medicine at this time and I will place a referral at her request.    -     Ambulatory Referral to Sports Medicine    2. Palpitations  -     ECG 12 Lead      4. Anxiety    She has had some anxious mood since starting amitriptyline.  We will order GeneSight testing to make sure she is metabolizing this medication appropriately.    -     GeneSight - Swab,;  Future          Rx changes: None    Follow-up:     Return for Patient to call to schedule appointment for GeneSight testing results.    Preventative:  Health Maintenance   Topic Date Due    HEPATITIS C SCREENING  Never done    ANNUAL PHYSICAL  Never done    INFLUENZA VACCINE  01/03/2024 (Originally 8/1/2023)    TDAP/TD VACCINES (3 - Tdap) 09/29/2033    COVID-19 Vaccine  Completed    Pneumococcal Vaccine 0-64  Aged Out       This document has been electronically signed by Tyrone Hagan MD on January 2, 2024 12:30 EST       Parts of this note are electronic transcriptions/translations of spoken language to printed text using the Dragon Dictation system.

## 2024-01-02 NOTE — TELEPHONE ENCOUNTER
Please advise    Patient having increased heart rate since starting amitriptyline-      Pt also requesting     Summary: ONB QUESTIONS      Caller: Monika Gu     Relationship: Self     Best call back number: [phone]      What is the best time to reach you: ANY     Who are you requesting to speak with (clinical staff, provider,  specific staff member): PROVIDER     What was the call regarding:TELEPHONED TO DISCUSS ONB INJECTIONS. SHE WANTS TO KNOW IF SHE HAS TO COME TO THE OFFICE HERE IN [place]  TO RECIEVE THE INJECTIONS OR CAN SHE GO LOCALLY ([facility]) TO GET INJECTIONS EACH TIME.     PLEASE CALL & ADVISE-THANK YOU      Is it okay if the provider responds through [facility]: YES

## 2024-01-02 NOTE — LETTER
January 2, 2024     Patient: Monika Gu   YOB: 1980   Date of Visit: 1/2/2024       To Whom It May Concern:    It is my medical opinion that Monika Gu may not return to work until she is cleared by neurology. Ms. Gu suffered a significant concussion in the course of her work duties and is still having long-lasting repercussions related to post-concussion syndrome. She is seeing a neurologist for this and he has not cleared her to return to work.      Sincerely,    Tyrone Hagan MD    CC: No Recipients

## 2024-01-02 NOTE — TELEPHONE ENCOUNTER
George took the call- notified patient of above information- patient notified george that her resting HR was over 100. She denied an appointment and said she will get further care from her PCP, but will keep her march appointment with Dr. Loyd.     Pt requests her PT/Ot order be sent to [facility]

## 2024-01-04 ENCOUNTER — TELEPHONE (OUTPATIENT)
Dept: FAMILY MEDICINE CLINIC | Facility: CLINIC | Age: 44
End: 2024-01-04
Payer: COMMERCIAL

## 2024-01-04 DIAGNOSIS — F07.81 POST CONCUSSIVE SYNDROME: Primary | ICD-10-CM

## 2024-01-04 NOTE — TELEPHONE ENCOUNTER
Health Maintenance Due   Topic Date Due   • Shingles Vaccine (1 of 2) Never done   • Diabetes Foot Exam  02/04/2023   • Pneumococcal Vaccine 0-64 (2 - PCV) 03/04/2023   • Diabetes Eye Exam  04/05/2023   • Diabetes A1C  04/25/2023   • Depression Screening  10/25/2023          Pt stated that she is ok to go to [facility] for 2nd opinion for neuro. Pt also said that she stopped taking her amitriptyline.

## 2024-01-04 NOTE — TELEPHONE ENCOUNTER
Patient has requested second opinion from neurologist.  Referral placed.      This document has been electronically signed by Tyrone Hagan MD on January 4, 2024 17:40 EST

## 2024-01-05 ENCOUNTER — TELEPHONE (OUTPATIENT)
Dept: FAMILY MEDICINE CLINIC | Facility: CLINIC | Age: 44
End: 2024-01-05
Payer: COMMERCIAL

## 2024-01-05 ENCOUNTER — TELEPHONE (OUTPATIENT)
Dept: NEUROLOGY | Facility: CLINIC | Age: 44
End: 2024-01-05

## 2024-01-05 NOTE — TELEPHONE ENCOUNTER
Neurology called on patients referral to [facility] stating that if patient wants a second opinion, she will need to see someone outside [facility] due to her already being seen by a neurologist within [facility] in [place]. They said they are unable to see her due to this.

## 2024-01-05 NOTE — TELEPHONE ENCOUNTER
Caller: Monika Gu    Relationship: Self    Best call back number: [phone] (home)     What is the best time to reach you: ANYTIME    What was the call regarding: PATIENT CALLED REGARDING HER THERAPY THAT HAS BEEN ORDERED. SHE STATED SHE WAS CONTACTED BY HER INS THAT THEY WERE BEING ASKED FOR AUTH AND BILLING BUT THIS IS THROUGH A W/C CLAIM SHE STATED SHOULD BE ON FILE. THE THERAPY AUTH NEEDS TO BE SUBMITTED THROUGH W/C.   W/C CASE # [id number]      PLEASE ADVISE  THANK YOU

## 2024-01-08 ENCOUNTER — OFFICE VISIT (OUTPATIENT)
Dept: FAMILY MEDICINE CLINIC | Facility: CLINIC | Age: 44
End: 2024-01-08
Payer: COMMERCIAL

## 2024-01-08 VITALS
BODY MASS INDEX: 23.45 KG/M2 | HEIGHT: 62 IN | DIASTOLIC BLOOD PRESSURE: 70 MMHG | HEART RATE: 79 BPM | OXYGEN SATURATION: 99 % | WEIGHT: 127.4 LBS | SYSTOLIC BLOOD PRESSURE: 101 MMHG

## 2024-01-08 DIAGNOSIS — T50.905D ADVERSE EFFECT OF DRUG, SUBSEQUENT ENCOUNTER: Primary | ICD-10-CM

## 2024-01-08 DIAGNOSIS — Z88.8 ALLERGY STATUS TO OTHER DRUGS, MEDICAMENTS AND BIOLOGICAL SUBSTANCES: ICD-10-CM

## 2024-01-08 DIAGNOSIS — R00.2 PALPITATIONS: ICD-10-CM

## 2024-01-08 PROCEDURE — 99213 OFFICE O/P EST LOW 20 MIN: CPT | Performed by: STUDENT IN AN ORGANIZED HEALTH CARE EDUCATION/TRAINING PROGRAM

## 2024-01-08 RX ORDER — MULTIPLE VITAMINS W/ MINERALS TAB 9MG-400MCG
1 TAB ORAL DAILY
COMMUNITY

## 2024-01-08 RX ORDER — MAGNESIUM OXIDE 400 MG/1
400 TABLET ORAL DAILY
COMMUNITY

## 2024-01-08 NOTE — PROGRESS NOTES
Subjective:       Monika Gu is a 43 y.o. female who presents for adverse medication reaction.    Ms. Gu has a pertinent medical history of concussive syndrome.  I previously referred her to a neurologist, and she has been started on amitriptyline, a common medication used to treat postconcussive syndrome.  At her last visit, she reported palpitations since starting the medication.  I did obtain an EKG that showed normal sinus rhythm.  As palpitations are a well-known side effect of amitriptyline, I had requested she reach out to her neurologist to discuss stopping medication.  She did reach out to neurologist and did stop use of medication.  I had also obtained a GeneSight test to see how she metabolized amitriptyline.  Today, we reviewed GeneSight testing.  She does have gene drug interaction with both nortriptyline and amitriptyline, and in the case of amitriptyline and this was considered a significant interaction.  I will add amitriptyline to her allergy list based on this result.  Since stopping amitriptyline, she reports improvement in her heart rate.  Heart rate is normal today.      The following portions of the patient's history were reviewed and updated as appropriate: allergies, current medications, past family history, past medical history, past social history, past surgical history, and problem list.    Past Medical Hx:  Past Medical History:   Diagnosis Date    Allergic     Arthritis     Concussion 10/2023    L FACIAL INJURY foot    Endometriosis     GERD (gastroesophageal reflux disease)     Headache     History of medical problems     Hyperthyroidism        Past Surgical Hx:  Past Surgical History:   Procedure Laterality Date    APPENDECTOMY      HYSTERECTOMY      OOPHORECTOMY  2019    ADENOCARCINOMA    TONSILLECTOMY         Current Meds:    Current Outpatient Medications:     Azelastine HCl 137 MCG/SPRAY solution, by Each Nare route 2 (Two) Times a Day As Needed. USE 2 SPRAYS PER  NOSTRIL TWICE A DAY AS NEEDED, Disp: , Rfl:     dexlansoprazole (DEXILANT) 60 MG capsule, Take 1 capsule by mouth Daily., Disp: , Rfl:     dicyclomine (BENTYL) 10 MG capsule, Take 1 capsule by mouth 4 (Four) Times a Day Before Meals & at Bedtime., Disp: 16 capsule, Rfl: 0    estradiol (ESTRACE) 0.1 MG/GM vaginal cream, Insert 2 applicators into the vagina Daily., Disp: , Rfl:     levothyroxine (SYNTHROID, LEVOTHROID) 50 MCG tablet, TAKE 1 TABLET BY MOUTH IN THE MORNING 2 DAYS WEEKS, Disp: 24 tablet, Rfl: 1    levothyroxine (SYNTHROID, LEVOTHROID) 75 MCG tablet, Take 1 tablet by mouth Every Morning. PT TAKES ONLY 2 DAYS A WEEK, Disp: , Rfl:     magnesium oxide (MAG-OX) 400 MG tablet, Take 1 tablet by mouth Daily., Disp: , Rfl:     multivitamin with minerals tablet tablet, Take 1 tablet by mouth Daily., Disp: , Rfl:     vitamin D3 125 MCG (5000 UT) capsule capsule, Take 1 capsule by mouth Daily., Disp: , Rfl:     celecoxib (CeleBREX) 100 MG capsule, Take 1 capsule by mouth 2 (Two) Times a Day As Needed for Mild Pain (FOR JOINT PAIN). (Patient not taking: Reported on 1/8/2024), Disp: , Rfl:     ondansetron (Zofran) 4 MG tablet, Take 1 tablet by mouth Every 8 (Eight) Hours As Needed for Nausea or Vomiting. (Patient not taking: Reported on 1/8/2024), Disp: 30 tablet, Rfl: 0    psyllium (Metamucil Smooth Texture) 58.6 % powder, Take  by mouth 3 (Three) Times a Day. (Patient not taking: Reported on 1/8/2024), Disp: 90 packet, Rfl: 12    Allergies:  Allergies   Allergen Reactions    Amitriptyline Palpitations       Family Hx:  Family History   Problem Relation Age of Onset    Diabetes Father     Diabetes Brother     Cancer Maternal Aunt     Colon cancer Neg Hx         Social History:  Social History     Socioeconomic History    Marital status: Single   Tobacco Use    Smoking status: Never     Passive exposure: Never    Smokeless tobacco: Never   Vaping Use    Vaping Use: Never used   Substance and Sexual Activity    Alcohol  "use: Not Currently     Alcohol/week: 1.0 standard drink of alcohol     Types: 1 Shots of liquor per week    Drug use: Never    Sexual activity: Defer       Review of Systems  Review of Systems   Cardiovascular:  Palpitations: improved.   Musculoskeletal:  Positive for arthralgias (hand pain).   Psychiatric/Behavioral:  Positive for sleep disturbance (insomnia).        Objective:     /70 (BP Location: Right arm, Patient Position: Sitting, Cuff Size: Infant)   Pulse 79   Ht 157.5 cm (62.01\")   Wt 57.8 kg (127 lb 6.4 oz)   SpO2 99%   BMI 23.30 kg/m²   Physical Exam  Constitutional:       General: She is not in acute distress.     Appearance: Normal appearance. She is normal weight. She is not ill-appearing, toxic-appearing or diaphoretic.   HENT:      Head: Normocephalic.   Pulmonary:      Effort: Pulmonary effort is normal. No respiratory distress.   Neurological:      Mental Status: She is alert.   Psychiatric:         Mood and Affect: Mood normal.         Behavior: Behavior normal.          Assessment/Plan:     Diagnoses and all orders for this visit:    1. Adverse effect of drug, subsequent encounter (Primary)    2. Palpitations    3. Allergy status to other drugs, medicaments and biological substances    Ms. Gu has a pertinent medical history of concussive syndrome.  I previously referred her to a neurologist, and she has been started on amitriptyline, a common medication used to treat postconcussive syndrome.  At her last visit, she reported palpitations since starting the medication.  I did obtain an EKG that showed normal sinus rhythm.  As palpitations are a well-known side effect of amitriptyline, I had requested she reach out to her neurologist to discuss stopping medication.  She did reach out to neurologist and did stop use of medication.  I had also obtained a GeneSight test to see how she metabolized amitriptyline.  Today, we reviewed GeneSight testing.  She does have gene drug interaction " with both nortriptyline and amitriptyline, and in the case of amitriptyline and this was considered a significant interaction.  I will add amitriptyline to her allergy list based on this result.  Since stopping amitriptyline, she reports improvement in her heart rate.  Heart rate is normal today.      Rx changes: Removing amitriptyline from medication list and adding it to allergy list    Follow-up:     Return in about 6 months (around 7/8/2024) for Hypothyroidism .    Preventative:  Health Maintenance   Topic Date Due    INFLUENZA VACCINE  Never done    HEPATITIS C SCREENING  Never done    ANNUAL PHYSICAL  Never done    TDAP/TD VACCINES (3 - Tdap) 09/29/2033    COVID-19 Vaccine  Completed    Pneumococcal Vaccine 0-64  Aged Out         This document has been electronically signed by Tyrone Hagan MD on January 15, 2024 09:18 EST       Parts of this note are electronic transcriptions/translations of spoken language to printed text using the Dragon Dictation system.

## 2024-01-12 ENCOUNTER — TELEPHONE (OUTPATIENT)
Dept: FAMILY MEDICINE CLINIC | Facility: CLINIC | Age: 44
End: 2024-01-12
Payer: COMMERCIAL

## 2024-01-12 ENCOUNTER — TREATMENT (OUTPATIENT)
Dept: PHYSICAL THERAPY | Facility: CLINIC | Age: 44
End: 2024-01-12
Payer: OTHER MISCELLANEOUS

## 2024-01-12 DIAGNOSIS — H83.90 DYSFUNCTION OF INNER EAR, UNSPECIFIED LATERALITY: Primary | ICD-10-CM

## 2024-01-12 DIAGNOSIS — F07.81 POST CONCUSSIVE SYNDROME: Primary | ICD-10-CM

## 2024-01-12 DIAGNOSIS — R26.89 BALANCE DISORDER: ICD-10-CM

## 2024-01-12 NOTE — TELEPHONE ENCOUNTER
Notified by staff that patient had inquired about the possibility of ENT referral.  According to staff, this was for postconcussive syndrome.  I clarified with staff that we are referring her to neurology for postconcussive syndrome and that this is not something I would usually refer patient to ENT for unless perhaps there is another problem or medical issue for which she desires to see ENT.      This document has been electronically signed by Tyrone Hagan MD on January 12, 2024 12:21 EST

## 2024-01-12 NOTE — TELEPHONE ENCOUNTER
Caller: Monika Gu    Relationship: Self    Best call back number: [phone]    What is the medical concern/diagnosis: POST CONCUSSIVE SYNDROME    What specialty or service is being requested: ENT     What is the provider, practice or medical service name: [facility]    [facility] [place]

## 2024-01-12 NOTE — TELEPHONE ENCOUNTER
Looks like patient has referral to Neuro for this. Are you wanting her to see ENT also? Please advise, thanks!

## 2024-01-12 NOTE — TELEPHONE ENCOUNTER
Patient call back stating when she seen her physical therapist last they told her it would be a good idea if she seen an ENT due to her having a lot of inner ear pain

## 2024-01-12 NOTE — TELEPHONE ENCOUNTER
Staff was able to clarify with patient that she would like to see ENT due to history of chronic inner ear pain.  She had previously been advised by physical therapist she would benefit from ENT referral.  I have communicated through staff that I would place this referral at her request today, but I did request for them to let her know that because we have not discussed this condition in detail in the past, ear nose and throat might like more information documented in an office encounter before excepting the referral.  I will place the referral at her request today, but it is possible we may need to discuss the issue in more detail prior to referral.  If that turns out to be the case, we would get her in for an office appointment to do so.      This document has been electronically signed by Tyrone Hagan MD on January 12, 2024 13:06 EST
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
## 2024-01-16 ENCOUNTER — TREATMENT (OUTPATIENT)
Dept: PHYSICAL THERAPY | Facility: CLINIC | Age: 44
End: 2024-01-16
Payer: OTHER MISCELLANEOUS

## 2024-01-16 DIAGNOSIS — F07.81 POST CONCUSSIVE SYNDROME: Primary | ICD-10-CM

## 2024-01-16 DIAGNOSIS — R26.89 BALANCE DISORDER: ICD-10-CM

## 2024-01-16 NOTE — PROGRESS NOTES
Physical Therapy Daily Note  [address] [place] [address]    VISIT#: 2    Subjective   Monika Gu reports she has been compliant with HEP and patient reports she is getting better with exercise.      Objective     See Exercise, Manual, and Modality Logs for complete treatment.     Assessment/Plan    Pt was able to tolerate horizontal saccades without increase in symptoms, but with vertical saccades patient had increase in feeling hot and increase in heart rate. Pt also had increase in heart rate and feeling hot with convergence exercise as well. Pt was able to tolerate visual tracking with head movement without increase in symptoms today. Pt reported increase in pain with tingling in head with suboccipital release and increase in pain on L side. Provided patient upper trap and levator stretch to add at home and will continue to progress patient as able.     Progress per Plan of Care and Progress strengthening /stabilization /functional activity            Timed:                 Manual Therapy:    23     mins  64117;     Therapeutic Exercise:    0     mins  80556;     Neuromuscular Go:    23    mins  82399;    Therapeutic Activity:     8     mins  81027;     Gait Trainin     mins  86621;     Ultrasound:     0     mins  36887;    Ionto                               0    mins   94517  Self pay                         0     mins [email or web address]    Un-Timed:  Electrical Stimulation:    0     mins  15335 ( )  Canalith Repos    0     mins 56061  Dry Needling     0     mins self-pay  Traction     0     mins 36407    Timed Treatment:   54   mins   Total Treatment:     54   mins    Adan Portillo PT  Physical Therapist    PT SIGNATURE: Electronically signed by Adan Portillo PT  [place] LICENSE: [id number]

## 2024-01-17 NOTE — TELEPHONE ENCOUNTER
I apologize for somehow not seeing this message, I called the patient and informed her, she states she does have an appt with ENT on 01/22/2024 @ 10:00.    Patient states she was checking her insurance to make sure everything was billed where it was suppose to be and her insurance denied the gene sight stated that they need more information on why that benefits her health, she states if we can not get it approved from her regular insurance she would like for it to be billed through workman's comp.    Patient also states her first visit regarding this issue with  was on 10/30/2023 and it was not billed under workman's comp, [facility] and she advise to me to tell the patient to call the main hospital and asking for the billing dept to have them re summit it through workman's comp.

## 2024-01-17 NOTE — TELEPHONE ENCOUNTER
Spoke with Leilani Yu (billing dept). She states they will send the 10/30/23 OV for review to be billed under WC. Patient informed.

## 2024-01-18 ENCOUNTER — TREATMENT (OUTPATIENT)
Dept: PHYSICAL THERAPY | Facility: CLINIC | Age: 44
End: 2024-01-18
Payer: OTHER MISCELLANEOUS

## 2024-01-18 ENCOUNTER — TELEPHONE (OUTPATIENT)
Dept: FAMILY MEDICINE CLINIC | Facility: CLINIC | Age: 44
End: 2024-01-18
Payer: COMMERCIAL

## 2024-01-18 DIAGNOSIS — F07.81 POST CONCUSSIVE SYNDROME: Primary | ICD-10-CM

## 2024-01-18 DIAGNOSIS — R26.89 BALANCE DISORDER: ICD-10-CM

## 2024-01-18 NOTE — PROGRESS NOTES
Physical Therapy Daily Note  [address] [place] [address]    VISIT#: 3    Subjective   Monika Gu reports she was fatigued after last therapy session and did sleep more that day. Pt reports she has been performing her exercises at home.       Objective     See Exercise, Manual, and Modality Logs for complete treatment.     Assessment/Plan    Continued to perform exercises and patient able to perform but did have increase in heart rate and feeling hot sensation with horizontal saccades. Performed [place] hallpike maneuver on patient and patient had no nystagmus but did have increase in pressure and feeling nauseous bilaterally. Discussed with patient to continue to perform exercises at home and will continue to progress patient as able.       Progress per Plan of Care and Progress strengthening /stabilization /functional activity            Timed:                 Manual Therapy:    23     mins  76502;     Therapeutic Exercise:    0     mins  51637;     Neuromuscular Go:    23    mins  14508;    Therapeutic Activity:     0     mins  23468;     Gait Trainin     mins  43627;     Ultrasound:     0     mins  22731;    Ionto                               0    mins   63727  Self pay                         0     mins [email or web address]    Un-Timed:  Electrical Stimulation:    0     mins  83919 ( )  Canalith Repos    0     mins 06523  Dry Needling     0     mins self-pay  Traction     0     mins 61632    Timed Treatment:   46   mins   Total Treatment:     46   mins    Adan Portillo PT  Physical Therapist    PT SIGNATURE: Electronically signed by Adan Portillo PT  [place] LICENSE: [id number]

## 2024-01-18 NOTE — TELEPHONE ENCOUNTER
Hoang Boggs called on patients referral that was sent over there. They are needing some clarification on the referral. They said it was 87 pages long and are needing to know exactly what it is for. The lakisha (cannot remember his name) is there until 2:30 today if you are able to give him a call back.     His direct line is [phone] ext [phone]

## 2024-01-22 ENCOUNTER — TELEPHONE (OUTPATIENT)
Dept: PHYSICAL THERAPY | Facility: OTHER | Age: 44
End: 2024-01-22
Payer: COMMERCIAL

## 2024-01-22 ENCOUNTER — OFFICE VISIT (OUTPATIENT)
Dept: OTOLARYNGOLOGY | Facility: CLINIC | Age: 44
End: 2024-01-22
Payer: OTHER MISCELLANEOUS

## 2024-01-22 ENCOUNTER — PROCEDURE VISIT (OUTPATIENT)
Dept: OTOLARYNGOLOGY | Facility: CLINIC | Age: 44
End: 2024-01-22
Payer: COMMERCIAL

## 2024-01-22 VITALS — HEART RATE: 78 BPM | TEMPERATURE: 97.5 F | OXYGEN SATURATION: 99 %

## 2024-01-22 DIAGNOSIS — H93.232: ICD-10-CM

## 2024-01-22 DIAGNOSIS — H93.A3 PULSATILE TINNITUS OF BOTH EARS: ICD-10-CM

## 2024-01-22 DIAGNOSIS — R26.89 IMBALANCE: ICD-10-CM

## 2024-01-22 DIAGNOSIS — H93.233 HYPERACUSIS OF BOTH EARS: ICD-10-CM

## 2024-01-22 DIAGNOSIS — R42 VERTIGO: ICD-10-CM

## 2024-01-22 DIAGNOSIS — H90.12 CONDUCTIVE HEARING LOSS OF LEFT EAR WITH UNRESTRICTED HEARING OF RIGHT EAR: ICD-10-CM

## 2024-01-22 DIAGNOSIS — H93.13 TINNITUS OF BOTH EARS: Primary | ICD-10-CM

## 2024-01-22 PROCEDURE — 99204 OFFICE O/P NEW MOD 45 MIN: CPT | Performed by: OTOLARYNGOLOGY

## 2024-01-22 PROCEDURE — 92557 COMPREHENSIVE HEARING TEST: CPT | Performed by: AUDIOLOGIST

## 2024-01-22 PROCEDURE — 92550 TYMPANOMETRY & REFLEX THRESH: CPT | Performed by: AUDIOLOGIST

## 2024-01-22 RX ORDER — SUMATRIPTAN 25 MG/1
1 TABLET, FILM COATED ORAL ONCE
COMMUNITY
Start: 2023-12-01

## 2024-01-22 NOTE — TELEPHONE ENCOUNTER
Caller: Monika Gu    Relationship: Self    What was the call regarding: PATIENT CANCELLED APPT TODAY DUE TO CONFLICT

## 2024-01-22 NOTE — PROGRESS NOTES
AUDIOMETRIC EVALUATION      Name:  Monika Gu  :  1980  Age:  43 y.o.  Date of Evaluation:  2024       History:  Mrs. Gu is seen today for a hearing evaluation due to hyperacusis.    Audiologic Information:  Concerns for Hearing: Some  PETs: No  Other otologic surgical history: None  Aural Pressure/Fullness: No  Otalgia: No  Otorrhea: No  Tinnitus: Yes  Dizziness: Yes  Noise Exposure: No  Family history of hearing loss: None  Head trauma requiring hospital stay: No but concussed  Chemotherapy: No  Other significant history: No      EVALUATION:    See audiogram    RESULTS:    Otoscopic Evaluation:        NOTE: Testing completed after ears were examined by Dr. Diaz.    Tympanometry (226 Hz):  Right: Type A  Left: Type A    IMPRESSIONS:  Pure tone thresholds for the right ear shows low normal hearing with a slight conductive component  Pure tone thresholds for the left ear shows a mild conductive hearing loss through speech tones.  Patient was counseled with regard to the findings.    RECOMMENDATIONS/PLAN:  Follow-up recommendations of Dr. Diaz.  Annual hearing test  Discussed results and recommendations with patient.         Jasbir Markham M.S, [facility]  Licensed Audiologist
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
## 2024-01-24 ENCOUNTER — PATIENT ROUNDING (BHMG ONLY) (OUTPATIENT)
Dept: OTOLARYNGOLOGY | Facility: CLINIC | Age: 44
End: 2024-01-24
Payer: COMMERCIAL

## 2024-01-24 ENCOUNTER — TREATMENT (OUTPATIENT)
Dept: PHYSICAL THERAPY | Facility: CLINIC | Age: 44
End: 2024-01-24
Payer: OTHER MISCELLANEOUS

## 2024-01-24 DIAGNOSIS — R26.89 BALANCE DISORDER: ICD-10-CM

## 2024-01-24 DIAGNOSIS — F07.81 POST CONCUSSIVE SYNDROME: Primary | ICD-10-CM

## 2024-01-24 NOTE — PROGRESS NOTES
Physical Therapy Daily Note  [address] [place] [address]    VISIT#: 4    Subjective   Monika Gu reports after last therapy session it took her 2 days to recover. Pt reports she went to ENT and does have hearing loss in L ear. Pt is also going to get CT scan of L ear and is waiting to get scheduled. Pt will follow back up with ENT in 3 months. Pt reports she did go to [facility] this week with no earplugs in and was able to make it 4-5 minutes before realizing she didn't have her earplugs in. Pt reports she was in the store for 10 minutes and did start being slightly symptomatic but overall was manageable.       Objective     See Exercise, Manual, and Modality Logs for complete treatment.     Assessment/Plan    Continued to progress patient's exercises. Pt was able to perform gaze stabilization standing and horizontal saccades with very minimal to no symptoms. Pt did become dizzy and off balance with vertical saccades but was able to perform for full minute. Also added light strengthening exercises today to improve scapular stabilizer strength and patient tolerated well. Educated patient that she can perform these light exercises at home and also light LE strengthening exercises at home as well. Discussed with patient that if she becomes symptomatic to stop and rest. Will continue to progress patient as able.     Progress per Plan of Care and Progress strengthening /stabilization /functional activity            Timed:                 Manual Therapy:    0     mins  83895;     Therapeutic Exercise:    23     mins  19873;     Neuromuscular Go:    23    mins  07134;    Therapeutic Activity:     0     mins  94204;     Gait Trainin     mins  73102;     Ultrasound:     0     mins  67963;    Ionto                               0    mins   55068  Self pay                         0     mins [email or web address]    Un-Timed:  Electrical Stimulation:    0     mins  44602 ( )  Canalith Repos    0     mins  54667  Dry Needling     0     mins self-pay  Traction     0     mins 78803    Timed Treatment:   46   mins   Total Treatment:     46   mins    Adan Portillo PT  Physical Therapist    PT SIGNATURE: Electronically signed by Adan Portillo PT  [place] LICENSE: [id number]

## 2024-01-24 NOTE — PROGRESS NOTES
A [employer] message has been send to the patient for [place] [facility]

## 2024-01-29 ENCOUNTER — TELEPHONE (OUTPATIENT)
Dept: GASTROENTEROLOGY | Facility: CLINIC | Age: 44
End: 2024-01-29
Payer: COMMERCIAL

## 2024-01-29 ENCOUNTER — TREATMENT (OUTPATIENT)
Dept: PHYSICAL THERAPY | Facility: CLINIC | Age: 44
End: 2024-01-29
Payer: OTHER MISCELLANEOUS

## 2024-01-29 ENCOUNTER — OFFICE VISIT (OUTPATIENT)
Dept: PHYSICAL THERAPY | Facility: CLINIC | Age: 44
End: 2024-01-29
Payer: OTHER MISCELLANEOUS

## 2024-01-29 DIAGNOSIS — R26.89 BALANCE DISORDER: ICD-10-CM

## 2024-01-29 DIAGNOSIS — F07.81 POST CONCUSSIVE SYNDROME: Primary | ICD-10-CM

## 2024-01-29 DIAGNOSIS — R41.841 COGNITIVE COMMUNICATION DEFICIT: Primary | ICD-10-CM

## 2024-01-29 NOTE — PROGRESS NOTES
Physical Therapy Daily Note  [address] [place] [address]    VISIT#: 5    Subjective   Monika Gu reports she is having a really bad day today. Pt reports her head is really hurting and she feels more emotional. Pt reports she has been able to perform light exercises for 20-30 minutes a day. Pt reports she was performing her vestibular exercises several times a day but patient called clinic last Friday and PT advised patient to decrease frequency of exercises and patient reports since she has done that she has felt better.       Objective     See Exercise, Manual, and Modality Logs for complete treatment.     Assessment/Plan    Continued to progress patient's scapular stabilization exercises and patient tolerated well today. Discussed with patient to continue to perform vestibular exercises at home. Perform UPA's and PA's to thoracic spine today due to patient having significant tightness in thoracic spine. Pt did have multiple cavitations with PA's and patient reports significant relief of symptoms afterwards. Pt had a very bad headache at start of session and reports after cavitations she no longer had a headache and the pressure in her head was gone. Will continue to progress patient as able.     Progress per Plan of Care and Progress strengthening /stabilization /functional activity            Timed:                 Manual Therapy:    23     mins  32225;     Therapeutic Exercise:    0     mins  74875;     Neuromuscular Go:    8    mins  93686;    Therapeutic Activity:     0     mins  55204;     Gait Trainin     mins  50715;     Ultrasound:     0     mins  66201;    Ionto                               0    mins   13704  Self pay                         0     mins [email or web address]    Un-Timed:  Electrical Stimulation:    0     mins  46039 (MC )  Canalith Repos    0     mins 17008  Dry Needling     0     mins self-pay  Traction     0     mins 32014    Timed Treatment:   31   mins   Total  Treatment:     31   mins    Adan Portillo PT  Physical Therapist    PT SIGNATURE: Electronically signed by Adan Portillo PT  [place] LICENSE: [id number]
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
## 2024-01-29 NOTE — TELEPHONE ENCOUNTER
Per referral patient's neuro wants her to wait a while before having procedure.  Post poning until 3.29.24 for follow up

## 2024-02-02 ENCOUNTER — TELEPHONE (OUTPATIENT)
Dept: GASTROENTEROLOGY | Facility: CLINIC | Age: 44
End: 2024-02-02
Payer: COMMERCIAL

## 2024-02-02 ENCOUNTER — TREATMENT (OUTPATIENT)
Dept: PHYSICAL THERAPY | Facility: CLINIC | Age: 44
End: 2024-02-02
Payer: COMMERCIAL

## 2024-02-02 ENCOUNTER — TELEPHONE (OUTPATIENT)
Dept: OTOLARYNGOLOGY | Facility: CLINIC | Age: 44
End: 2024-02-02

## 2024-02-02 DIAGNOSIS — R41.841 COGNITIVE COMMUNICATION DEFICIT: Primary | ICD-10-CM

## 2024-02-02 PROCEDURE — 82948 REAGENT STRIP/BLOOD GLUCOSE: CPT

## 2024-02-02 NOTE — TELEPHONE ENCOUNTER
Hub staff attempted to follow warm transfer process and was unsuccessful     Caller: JANE ANTHONY     Relationship to patient: [employer]    Best call back number: [phone]     Patient is needing: IS WITH THE [employer]PT OF [employer] AND SHE WAS RETURNING RADHA'S CALL. PLEASE GIVE HER A CALL BACK.     THEY'VE BEEN PLAYING PHONE TAG. SHE WAS SUPPOSED TO BE LEAVING THE OFFICE AT 10 TODAY BUT SHE WILL STAY UNTIL 11 TO WAIT FOR RADHA'S CALL.

## 2024-02-02 NOTE — PROGRESS NOTES
Outpatient Adult Speech Language Therapy           Treatment Note    Patient: Monika Gu                                                                                     Visit Date: 2024  :     1980    Referring practitioner:     Theodore Loyd MD  Date of Initial Visit:          24    Patient seen for 2 sessions    Visit Diagnoses:    ICD-10-CM ICD-9-CM   1. Cognitive communication deficit  R41.841 799.52     SUBJECTIVE     Patient attending [facility] Speech Therapy to increase memory recall and expressive/receptive language to safely, accurately and in a timely manner complete and communicate tasks during IALDs.    Education: POC, assessment results, memory strategies, use of memory book    OBJECTIVE   GOALS    GOALS ACTIVITY PERFORMED TRIALS COMPLETED LEVEL OF CUEING REQUIRED    LTG 1: 12 weeks: Patient will increase Functional Communication Measure Score for memory to 6/7 decreasing limitation to 1-19%.            STG 1a: 12 weeks: Patient will demonstrate the ability to use appropriate memory strategies to encode novel and complex information with min cueing.   Memory strategies -Patient taught five main memory strategies to encode information into memory    -Patient and therapist developed a memory book for recall of important information Max assist to teach with patient verbalizing understanding.        STG 1b: 12 weeks:  Patient will complete moderately complex short term memory tasks with 85+% accuracy and/or min assist for strategy use.        STG 1c:  12 weeks: Patient will complete immediate memory tasks with 85+% accuracy and min assist for strategy use.   Encoding immediate information into memory Auditory recall using visualization 2/5    Association to recall 10 words 10/10 Max assist to teach strategy use.     STG 2a:  12 weeks:  Patient will generative name in a broad category 15+ items in one-minute  with min cueing for strategy use.       STG 2b: 12 weeks:  Patient will complete word recall activities with 85+% accuracy and min assist.       STG 2c:  12 weeks:  Patient will complete auditory attention tasks with 85+% accuracy and min assist.        STG 2d: 12 weeks:  Patient will describe/tell a story or event with min assist.                Total Time of Visit:             [id number]= 45   mins         ASSESSMENT/PLAN     ASSESSMENT: Patient requires the skills of a therapist to provide maximum cueing to learn strategies to encode novel information into memory for immediate recall during IADLs.     PLAN: Continue plan of care    Progress Note Due Date:02/29/24  Recertification Due Date:04/08/24    SIGNATURE: LEAH Lam, [place] License #: [id number]  Electronically Signed on 2/2/2024            Adult Outpatient Rehabiliation                                             [address] [place] [address]. [address]  [phone] Office  [phone] Fax

## 2024-02-05 ENCOUNTER — TELEMEDICINE (OUTPATIENT)
Dept: FAMILY MEDICINE CLINIC | Facility: CLINIC | Age: 44
End: 2024-02-05
Payer: OTHER MISCELLANEOUS

## 2024-02-05 ENCOUNTER — LAB (OUTPATIENT)
Dept: LAB | Facility: HOSPITAL | Age: 44
End: 2024-02-05
Payer: COMMERCIAL

## 2024-02-05 ENCOUNTER — TREATMENT (OUTPATIENT)
Dept: PHYSICAL THERAPY | Facility: CLINIC | Age: 44
End: 2024-02-05
Payer: OTHER MISCELLANEOUS

## 2024-02-05 VITALS — WEIGHT: 127 LBS | HEIGHT: 62 IN | BODY MASS INDEX: 23.37 KG/M2

## 2024-02-05 DIAGNOSIS — R00.2 PALPITATION: ICD-10-CM

## 2024-02-05 DIAGNOSIS — F07.81 POST CONCUSSIVE SYNDROME: ICD-10-CM

## 2024-02-05 DIAGNOSIS — E03.9 HYPOTHYROIDISM, UNSPECIFIED TYPE: ICD-10-CM

## 2024-02-05 DIAGNOSIS — E03.9 HYPOTHYROIDISM, UNSPECIFIED TYPE: Primary | ICD-10-CM

## 2024-02-05 DIAGNOSIS — R41.841 COGNITIVE COMMUNICATION DEFICIT: Primary | ICD-10-CM

## 2024-02-05 DIAGNOSIS — R26.89 BALANCE DISORDER: ICD-10-CM

## 2024-02-05 LAB
MAGNESIUM SERPL-MCNC: 2.1 MG/DL (ref 1.6–2.6)
T4 FREE SERPL-MCNC: 1.47 NG/DL (ref 0.93–1.7)
TSH SERPL DL<=0.05 MIU/L-ACNC: 2.58 UIU/ML (ref 0.27–4.2)

## 2024-02-05 PROCEDURE — 36415 COLL VENOUS BLD VENIPUNCTURE: CPT

## 2024-02-05 PROCEDURE — 84439 ASSAY OF FREE THYROXINE: CPT

## 2024-02-05 PROCEDURE — 97130 THER IVNTJ EA ADDL 15 MIN: CPT | Performed by: SPEECH-LANGUAGE PATHOLOGIST

## 2024-02-05 PROCEDURE — 97129 THER IVNTJ 1ST 15 MIN: CPT | Performed by: SPEECH-LANGUAGE PATHOLOGIST

## 2024-02-05 PROCEDURE — 84443 ASSAY THYROID STIM HORMONE: CPT

## 2024-02-05 PROCEDURE — 83735 ASSAY OF MAGNESIUM: CPT

## 2024-02-05 NOTE — PROGRESS NOTES
Physical Therapy Daily Note  [address] [place] [address]    VISIT#: 6    Subjective   Monika Gu reports she is feeling better and reports she has been performing her exercises 3x a day. Pt reports to PT today without earplugs in her ears in the waiting room for the first time.       Objective     See Exercise, Manual, and Modality Logs for complete treatment.     Assessment/Plan    Focused exercises today on vestibular exercises and patient was able to progress all exercises today and able to include unstable surface. Pt was able to tolerate all exercises with moments of increase in HR that required rest breaks to recover. Pt's highest HR was 111 bpm throughout session and patient became hot but able to recover quickly with sitting. Will continue to progress patient as able.     Progress per Plan of Care and Progress strengthening /stabilization /functional activity            Timed:                 Manual Therapy:    8     mins  19655;     Therapeutic Exercise:    0     mins  08863;     Neuromuscular oG:    25    mins  95450;    Therapeutic Activity:     12     mins  38043;     Gait Trainin     mins  67520;     Ultrasound:     0     mins  26053;    Ionto                               0    mins   31802  Self pay                         0     mins [id number]    Un-Timed:  Electrical Stimulation:    0     mins  02018 ( )  Canalith Repos    0     mins 43808  Dry Needling     0     mins self-pay  Traction     0     mins 75646    Timed Treatment:   45   mins   Total Treatment:     45   mins    Adan Portillo PT  Physical Therapist    PT SIGNATURE: Electronically signed by Adan Portillo PT  [place] LICENSE: [id number]

## 2024-02-05 NOTE — PROGRESS NOTES
Subjective:       You have chosen to receive care through a telehealth visit.  Do you consent to use a video/audio connection for your medical care today? Yes  This was a video visit.  Approximately 30 minutes was spent on this visit.      Monika Gu is a 43 y.o. female who presents for follow-up for postconcussive syndrome and to discuss palpitations.    Ms. Gu has a history of postconcussive syndrome.  Please see my prior notes in regards to this.  She is currently being treated by neurology.  She is also seen ENT due to symptoms that include tinnitus and ataxia.  She has been diagnosed with some conductive hearing loss in the left ear by them.  They have ordered a CT to check for the bones in her ear to make sure they are not broken as well as blood vessels and fluid flowing through the ears.  She is also working with physical therapy and speech therapy including activities at home with oculovestibular and balance activities, cognitive therapy for short-term memory loss, word retrieval and executive functioning.  In terms of this, treatment and progress is ongoing.    However, she also reports palpitations.  She has had nightly episodes waking her from sleep in which she experiences heart racing.  This has been going on for some time and initially she attributed this to a headache.  However, now migraines have resolved but she still has heart racing nearly every night between 1 and 2 in the morning.  It is progressed to the point where she has trouble sleeping and was concerned enough that she recently presented to urgent care.  I have reviewed the note from that encounter.  EKG showed sinus tachycardia.  She was discharged with hydroxyzine and a presumed diagnosis of anxiety.  However, Ms. Ogden does clarify for me that she understands anxiety and does feel symptoms of anxiety when she is putting pressure on herself to perform with therapy but her symptoms are not consistent with anxiety in regards  to palpitations.    I would obtain a Holter monitor on her to further assess her rate and rhythm.  I would also order a TSH and magnesium as she does have a somewhat unusual thyroid replacement regimen for hypothyroidism and it is due to be rechecked.  Would see back in 1 month but may need referral to cardiology prior to that        The following portions of the patient's history were reviewed and updated as appropriate: allergies, current medications, past family history, past medical history, past social history, past surgical history, and problem list.    Past Medical Hx:  Past Medical History:   Diagnosis Date    Allergic     Allergic rhinitis 2002    Seasonal allergy symptoms all seasons    Arthritis     Concussion 10/2023    L FACIAL INJURY foot    Dizziness October 26, 2023    Concussion    Endometriosis     Fracture of nasal bones 1996    Left side of bridge of nose    GERD (gastroesophageal reflux disease)     Headache     History of medical problems     Hyperthyroidism     Nosebleed 2002    Dry weather and seasonal changes    Tinnitus October 26, 2023    Concussion       Past Surgical Hx:  Past Surgical History:   Procedure Laterality Date    APPENDECTOMY      HYSTERECTOMY      OOPHORECTOMY  2019    ADENOCARCINOMA    TONSILLECTOMY         Current Meds:    Current Outpatient Medications:     Azelastine HCl 137 MCG/SPRAY solution, by Each Nare route 2 (Two) Times a Day As Needed. USE 2 SPRAYS PER NOSTRIL TWICE A DAY AS NEEDED, Disp: , Rfl:     celecoxib (CeleBREX) 100 MG capsule, Take 1 capsule by mouth 2 (Two) Times a Day As Needed for Mild Pain (FOR JOINT PAIN)., Disp: , Rfl:     dexlansoprazole (DEXILANT) 60 MG capsule, Take 1 capsule by mouth Daily., Disp: , Rfl:     Diclofenac Sodium (VOLTAREN) 1 % gel gel, APPLY 4 G TOPICALLY TO THE APPROPRIATE AREA AS DIRECTED 4 (FOUR) TIMES A DAY AS NEEDED (HAND PAIN)., Disp: , Rfl:     dicyclomine (BENTYL) 10 MG capsule, Take 1 capsule by mouth 4 (Four) Times a Day  Before Meals & at Bedtime., Disp: 16 capsule, Rfl: 0    estradiol (ESTRACE) 0.1 MG/GM vaginal cream, Insert 2 applicators into the vagina Daily., Disp: , Rfl:     hydrOXYzine pamoate (VISTARIL) 50 MG capsule, Take 1 capsule by mouth 4 (Four) Times a Day As Needed for Anxiety., Disp: 40 capsule, Rfl: 0    levothyroxine (SYNTHROID, LEVOTHROID) 50 MCG tablet, TAKE 1 TABLET BY MOUTH IN THE MORNING 2 DAYS WEEKS, Disp: 24 tablet, Rfl: 1    levothyroxine (SYNTHROID, LEVOTHROID) 75 MCG tablet, Take 1 tablet by mouth Every Morning. PT TAKES ONLY 2 DAYS A WEEK, Disp: , Rfl:     magnesium oxide (MAG-OX) 400 MG tablet, Take 1 tablet by mouth Daily., Disp: , Rfl:     multivitamin with minerals tablet tablet, Take 1 tablet by mouth Daily., Disp: , Rfl:     ondansetron (Zofran) 4 MG tablet, Take 1 tablet by mouth Every 8 (Eight) Hours As Needed for Nausea or Vomiting., Disp: 30 tablet, Rfl: 0    psyllium (Metamucil Smooth Texture) 58.6 % powder, Take  by mouth 3 (Three) Times a Day., Disp: 90 packet, Rfl: 12    SUMAtriptan (IMITREX) 25 MG tablet, Take 1 tablet by mouth 1 (One) Time., Disp: , Rfl:     vitamin D3 125 MCG (5000 UT) capsule capsule, Take 1 capsule by mouth Daily., Disp: , Rfl:     Allergies:  Allergies   Allergen Reactions    Amitriptyline Palpitations       Family Hx:  Family History   Problem Relation Age of Onset    Diabetes Father     Migraines Father         Whole family    Diabetes Brother     Cancer Maternal Aunt     Hypertension Mother     Thyroid disease Mother     Migraines Mother         Whole family    Cancer Paternal Grandmother     Colon cancer Neg Hx         Social History:  Social History     Socioeconomic History    Marital status: Single   Tobacco Use    Smoking status: Never     Passive exposure: Never    Smokeless tobacco: Never   Vaping Use    Vaping Use: Never used   Substance and Sexual Activity    Alcohol use: Not Currently     Alcohol/week: 1.0 standard drink of alcohol    Drug use: Never     Sexual activity: Defer       Review of Systems  Review of Systems   Cardiovascular:  Positive for palpitations.       Objective:     This was a telephone and video visit.  No physical exam could be obtained and no vital signs could be obtained     Assessment/Plan:     Diagnoses and all orders for this visit:    Postconcussive syndrome      Ms. Gu has a history of postconcussive syndrome.  Please see my prior notes in regards to this.  She is currently being treated by neurology.  She is also seen ENT due to symptoms that include tinnitus and ataxia.  She has been diagnosed with some conductive hearing loss in the left ear by them.  They have ordered a CT to check for the bones in her ear to make sure they are not broken as well as blood vessels and fluid flowing through the ears.  She is also working with physical therapy and speech therapy including activities at home with oculovestibular and balance activities, cognitive therapy for short-term memory loss, word retrieval and executive functioning.  In terms of this, treatment and progress is ongoing.        2. Palpitation  1. Hypothyroidism, unspecified type (Primary)  Ms. Gu has had nightly episodes waking her from sleep in which she experiences heart racing.  This has been going on for some time and initially she attributed this to a headache.  However, now migraines have resolved but she still has heart racing nearly every night between 1 and 2 in the morning.  It is progressed to the point where she has trouble sleeping and was concerned enough that she recently presented to urgent care.  I have reviewed the note from that encounter.  EKG showed sinus tachycardia.  She was discharged with hydroxyzine and a presumed diagnosis of anxiety.  However, Ms. Ogden does clarify for me that she understands anxiety and does feel symptoms of anxiety when she is putting pressure on herself to perform with therapy but her symptoms are not consistent with anxiety in  regards to palpitations.      I would obtain a Holter monitor on her to further assess her rate and rhythm.  I would also order a TSH and magnesium as she does have a somewhat unusual thyroid replacement regimen for hypothyroidism and it is due to be rechecked.  Would see back in 1 month but may need referral to cardiology prior to that    -     TSH+Free T4; Future  -     Holter monitor - 48 hour; Future  -     Magnesium; Future          Rx changes: None    Follow-up:     Return in about 1 month (around 3/5/2024) for Palpitations .    Preventative:  Health Maintenance   Topic Date Due    HEPATITIS C SCREENING  Never done    ANNUAL PHYSICAL  Never done    INFLUENZA VACCINE  02/06/2024 (Originally 8/1/2023)    TDAP/TD VACCINES (3 - Tdap) 09/29/2033    COVID-19 Vaccine  Completed    Pneumococcal Vaccine 0-64  Aged Out           This document has been electronically signed by Tyrone Hagan MD on February 5, 2024 09:40 EST       Parts of this note are electronic transcriptions/translations of spoken language to printed text using the Dragon Dictation system.

## 2024-02-05 NOTE — PROGRESS NOTES
Outpatient Adult Speech Language Therapy           Treatment Note    Patient: Monika Gu                                                                                     Visit Date: 2024  :     1980    Referring practitioner:     Theodore Loyd MD  Date of Initial Visit:          24    Patient seen for 3 sessions    Visit Diagnoses:    ICD-10-CM ICD-9-CM   1. Cognitive communication deficit  R41.841 799.52       SUBJECTIVE     Patient attending [facility] Speech Therapy to increase memory recall and expressive/receptive language to safely, accurately and in a timely manner complete and communicate tasks during IALDs.    Education: POC, assessment results, memory strategies, use of memory book    OBJECTIVE   GOALS    GOALS ACTIVITY PERFORMED TRIALS COMPLETED LEVEL OF CUEING REQUIRED    LTG 1: 12 weeks: Patient will increase Functional Communication Measure Score for memory to 6/7 decreasing limitation to 1-19%.            STG 1a: 12 weeks: Patient will demonstrate the ability to use appropriate memory strategies to encode novel and complex information with min cueing.   Memory strategies -Patient taught five main memory strategies to encode information into memory    Visual, write it down to encode a daily schedule     Min to mod assist to use strategies.        STG 1b: 12 weeks:  Patient will complete moderately complex short term memory tasks with 85+% accuracy and/or min assist for strategy use.   Delayed recall 20 minutes- schedule 5/5 Min assist   STG 1c:  12 weeks: Patient will complete immediate memory tasks with 85+% accuracy and min assist for strategy use.   Immediate recall Recall daily schedule that includes 5 activities with details  5/5 Min to mod assist for strategy use.     STG 2a:  12 weeks:  Patient will generative name in a broad category 15+ items in one-minute with min cueing for strategy use.   Generative Name  Large category- Scattegories  Goal of naming 12 in one-minute or less  Fruits 12  Vacation spots 12  Things that need to be cleaned 12  Smaller than a fist 8  Notorious people-7  Authors-6 Min assist   STG 2b: 12 weeks:  Patient will complete word recall activities with 85+% accuracy and min assist. Word recall  Synonyms- 17/20 Min assist    STG 2c:  12 weeks:  Patient will complete auditory attention tasks with 85+% accuracy and min assist.        STG 2d: 12 weeks:  Patient will describe/tell a story or event with min assist.                Total Time of Visit:             15:17-  15:17 60 mins         ASSESSMENT/PLAN     ASSESSMENT: Patient requires the skills of a therapist to provide reduced cueing to min to mod assist to use strategies to encode novel information into memory and for strategies for word recall  during IADLs.   Progress: Patient is encoding information into memory using appropriate strategies and is demonstrating better word recall skills.   PLAN: Continue plan of care    Progress Note Due Date:02/29/24  Recertification Due Date:04/08/24    SIGNATURE: LEAH Lam, [place] License #: [id number]  Electronically Signed on 2/5/2024            Adult Outpatient Rehabiliation                                             [address] [place] [address]. [address]  [phone] Office  [phone] Fax

## 2024-02-06 ENCOUNTER — OFFICE VISIT (OUTPATIENT)
Dept: NEUROLOGY | Facility: CLINIC | Age: 44
End: 2024-02-06
Payer: COMMERCIAL

## 2024-02-06 VITALS
BODY MASS INDEX: 23.37 KG/M2 | SYSTOLIC BLOOD PRESSURE: 114 MMHG | WEIGHT: 127 LBS | HEIGHT: 62 IN | DIASTOLIC BLOOD PRESSURE: 74 MMHG | HEART RATE: 83 BPM

## 2024-02-06 DIAGNOSIS — F07.81 POST CONCUSSIVE SYNDROME: Primary | ICD-10-CM

## 2024-02-06 PROCEDURE — 99214 OFFICE O/P EST MOD 30 MIN: CPT | Performed by: PSYCHIATRY & NEUROLOGY

## 2024-02-06 RX ORDER — MAGNESIUM GLUCONATE 27 MG(500)
27 TABLET ORAL 2 TIMES DAILY
COMMUNITY

## 2024-02-06 RX ORDER — PROPRANOLOL HYDROCHLORIDE 10 MG/1
10 TABLET ORAL 2 TIMES DAILY
Qty: 60 TABLET | Refills: 5 | Status: SHIPPED | OUTPATIENT
Start: 2024-02-06
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
## 2024-02-06 NOTE — PROGRESS NOTES
I have reviewed Ms. Gu's labs.  Can we let her know that magnesium and thyroid levels are both normal?    Thank you,    Tyrone Hagan    ?  This document has been electronically signed by Tyrone Hagan MD on February 6, 2024 08:00 EST

## 2024-02-08 ENCOUNTER — TREATMENT (OUTPATIENT)
Dept: PHYSICAL THERAPY | Facility: CLINIC | Age: 44
End: 2024-02-08
Payer: OTHER MISCELLANEOUS

## 2024-02-08 DIAGNOSIS — F07.81 POST CONCUSSIVE SYNDROME: Primary | ICD-10-CM

## 2024-02-08 DIAGNOSIS — R26.89 BALANCE DISORDER: ICD-10-CM

## 2024-02-08 NOTE — PROGRESS NOTES
Physical Therapy Daily Note  [address] [place] [address]    VISIT#: 7    Subjective   Monika Gu reports she is having an off day today. Pt reports she has a headache and presents to PT with her headphones in her ears. Pt reports she is now on a beta blocker but didn't take it yesterday or today due to patient having significant headache after taking it on Tuesday.       Objective     See Exercise, Manual, and Modality Logs for complete treatment.     Assessment/Plan    Able to progress all of patient's exercises today to higher level exercises and patient was able to perform but did require rest breaks with some exercises due to patient not feeling well and needing increase in time to recover. Able to go into more crowded room today and patient able to perform scanning exercise and while patient did become symptomatic at end, patient was able to complete. Pt did report a decrease in pressure in head after manual therapy today. Will reassess patient's goals next PT session.     Progress per Plan of Care and Progress strengthening /stabilization /functional activity            Timed:                 Manual Therapy:    8     mins  06577;     Therapeutic Exercise:    0     mins  83234;     Neuromuscular Go:    25    mins  97218;    Therapeutic Activity:     25     mins  18552;     Gait Trainin     mins  33703;     Ultrasound:     0     mins  36025;    Ionto                               0    mins   60022  Self pay                         0     mins [email or web address]    Un-Timed:  Electrical Stimulation:    0     mins  56548 ( )  Canalith Repos    0     mins 32513  Dry Needling     0     mins self-pay  Traction     0     mins 17905    Timed Treatment:   58   mins   Total Treatment:     58   mins    Adan Portillo PT  Physical Therapist    PT SIGNATURE: Electronically signed by Adan Portillo PT  [place] LICENSE: [id number]

## 2024-02-09 ENCOUNTER — TREATMENT (OUTPATIENT)
Dept: PHYSICAL THERAPY | Facility: CLINIC | Age: 44
End: 2024-02-09
Payer: OTHER MISCELLANEOUS

## 2024-02-09 DIAGNOSIS — R41.841 COGNITIVE COMMUNICATION DEFICIT: Primary | ICD-10-CM

## 2024-02-09 NOTE — PROGRESS NOTES
Outpatient Adult Speech Language Therapy           Treatment Note    Patient: Monika Gu                                                                                     Visit Date: 2024  :     1980    Referring practitioner:     Theodore Loyd MD  Date of Initial Visit:          24    Patient seen for 4 sessions    Visit Diagnoses:    ICD-10-CM ICD-9-CM   1. Cognitive communication deficit  R41.841 799.52       SUBJECTIVE     Patient attending [facility] Speech Therapy to increase memory recall and expressive/receptive language to safely, accurately and in a timely manner complete and communicate tasks during IALDs.    Education: POC,  memory strategies, use of memory book    OBJECTIVE   GOALS    GOALS ACTIVITY PERFORMED TRIALS COMPLETED LEVEL OF CUEING REQUIRED    LTG 1: 12 weeks: Patient will increase Functional Communication Measure Score for memory to 6/7 decreasing limitation to 1-19%.            STG 1a: 12 weeks: Patient will demonstrate the ability to use appropriate memory strategies to encode novel and complex information with min cueing.   Memory strategies Patient reviewed five main memory strategies to encode information into memory with clinician.     Visualization, writing it down, and association were utilized throughout the session.     Min to mod assist to use strategies.        STG 1b: 12 weeks:  Patient will complete moderately complex short term memory tasks with 85+% accuracy and/or min assist for strategy use.   Recalling Shapes Patient was shown boxes that included 3 shapes that she was asked to encode into memory. Clinician increased difficulty by having patient recall up to 6 shapes. When difficulty was increased patient's accuracy was noted to be decreased. After clinician prompted patient to implement strategies such as association/ visualization it was noted that the patient's accuracy was  increased.    Min to mod assist to implement strategies into activities.    STG 1c:  12 weeks: Patient will complete immediate memory tasks with 85+% accuracy and min assist for strategy use.   Immediate recall              Meeting [place]  Recall information from work bautista that was read by clinician. Patient had difficulty sustaining attention. Patient occasionally was unable to recall information/ responded with inaccurate information.     Patient was asked to create an agenda for a meeting at work. Patient was unable to complete task in timely manner due to difficulty with sustained attention. Clinician noted that the patient was often off topic and needed cueing in order to be brought back on track.    Min to mod assist.               Mod assist    STG 2a:  12 weeks:  Patient will generative name in a broad category 15+ items in one-minute with min cueing for strategy use.       STG 2b: 12 weeks:  Patient will complete word recall activities with 85+% accuracy and min assist.       STG 2c:  12 weeks:  Patient will complete auditory attention tasks with 85+% accuracy and min assist.   B[place]ista  Patient was asked to recall information from work bautista. Clinician read bautista and asked patient to recall information and answer questions. Patient had difficulty sustaining attention. Patient occasionally was unable to recall information or responded with inaccurate information.     Min to mod assist   STG 2d: 12 weeks:  Patient will describe/tell a story or event with min assist.                Total Time of Visit:            2:00- 3:00 60 mins         ASSESSMENT/PLAN     ASSESSMENT: Patient requires the skills of a therapist to provide reduced cueing to min to mod assist to use strategies to encode novel information into memory and for strategies for word recall  during IADLs.   Progress: Patient is encoding information into memory using appropriate strategies and is demonstrating better word recall skills.   PLAN: Continue  plan of care    Progress Note Due Date:02/29/24  Recertification Due Date:04/08/24    SIGNATURE: Angelica Wan, Speech Therapy Student, [place] License #: [id number]  Electronically Signed on 2/9/2024            Adult Outpatient Rehabiliation                                             [address] [place] [address]. [phone]  [phone] Office  [phone] Fax

## 2024-02-12 ENCOUNTER — TREATMENT (OUTPATIENT)
Dept: PHYSICAL THERAPY | Facility: CLINIC | Age: 44
End: 2024-02-12
Payer: OTHER MISCELLANEOUS

## 2024-02-12 ENCOUNTER — TELEPHONE (OUTPATIENT)
Dept: FAMILY MEDICINE CLINIC | Facility: CLINIC | Age: 44
End: 2024-02-12
Payer: COMMERCIAL

## 2024-02-12 DIAGNOSIS — R41.841 COGNITIVE COMMUNICATION DEFICIT: Primary | ICD-10-CM

## 2024-02-12 DIAGNOSIS — R26.89 BALANCE DISORDER: ICD-10-CM

## 2024-02-12 DIAGNOSIS — F07.81 POST CONCUSSIVE SYNDROME: ICD-10-CM

## 2024-02-13 ENCOUNTER — TELEPHONE (OUTPATIENT)
Dept: FAMILY MEDICINE CLINIC | Facility: CLINIC | Age: 44
End: 2024-02-13
Payer: COMMERCIAL

## 2024-02-13 ENCOUNTER — HOSPITAL ENCOUNTER (OUTPATIENT)
Dept: CT IMAGING | Facility: HOSPITAL | Age: 44
Discharge: HOME | End: 2024-02-13
Admitting: OTOLARYNGOLOGY
Payer: COMMERCIAL

## 2024-02-13 DIAGNOSIS — H93.A3 PULSATILE TINNITUS OF BOTH EARS: ICD-10-CM

## 2024-02-13 DIAGNOSIS — H90.12 CONDUCTIVE HEARING LOSS OF LEFT EAR WITH UNRESTRICTED HEARING OF RIGHT EAR: ICD-10-CM

## 2024-02-13 PROCEDURE — 25510000001 IOPAMIDOL PER 1 ML: Performed by: OTOLARYNGOLOGY

## 2024-02-13 PROCEDURE — 70481 CT ORBIT/EAR/FOSSA W/DYE: CPT

## 2024-02-13 RX ADMIN — IOPAMIDOL 100 ML: 755 INJECTION, SOLUTION INTRAVENOUS at 13:58

## 2024-02-13 NOTE — TELEPHONE ENCOUNTER
Caller: [facility]ian[facility] neurology    Relationship: Mague     Best call back number:     What is the best time to reach you: anytime    Who are you requesting to speak with (clinical staff, provider,  specific staff member): referral coordinator    Do you know the name of the person who called: mague     What was the call regarding: Mague from neurology states she has been receiving a fax from our office with 88 pages, she's states only 50 pages are coming through every time it is being faxed,she states that all they need is pages 60-88.    Is it okay if the provider responds through [employer]: no

## 2024-02-15 ENCOUNTER — TREATMENT (OUTPATIENT)
Dept: PHYSICAL THERAPY | Facility: CLINIC | Age: 44
End: 2024-02-15
Payer: OTHER MISCELLANEOUS

## 2024-02-15 DIAGNOSIS — R41.841 COGNITIVE COMMUNICATION DEFICIT: Primary | ICD-10-CM

## 2024-02-15 DIAGNOSIS — F07.81 POST CONCUSSIVE SYNDROME: ICD-10-CM

## 2024-02-15 DIAGNOSIS — R00.2 PALPITATIONS: Primary | ICD-10-CM

## 2024-02-15 DIAGNOSIS — F07.81 POST CONCUSSIVE SYNDROME: Primary | ICD-10-CM

## 2024-02-15 DIAGNOSIS — R26.89 BALANCE DISORDER: ICD-10-CM

## 2024-02-16 ENCOUNTER — TREATMENT (OUTPATIENT)
Dept: PHYSICAL THERAPY | Facility: CLINIC | Age: 44
End: 2024-02-16
Payer: OTHER MISCELLANEOUS

## 2024-02-16 DIAGNOSIS — R41.841 COGNITIVE COMMUNICATION DEFICIT: Primary | ICD-10-CM

## 2024-02-21 ENCOUNTER — TREATMENT (OUTPATIENT)
Dept: PHYSICAL THERAPY | Facility: CLINIC | Age: 44
End: 2024-02-21
Payer: OTHER MISCELLANEOUS

## 2024-02-21 DIAGNOSIS — F07.81 POST CONCUSSIVE SYNDROME: ICD-10-CM

## 2024-02-21 DIAGNOSIS — R41.841 COGNITIVE COMMUNICATION DEFICIT: Primary | ICD-10-CM

## 2024-02-21 DIAGNOSIS — R26.89 BALANCE DISORDER: ICD-10-CM

## 2024-02-21 NOTE — PROGRESS NOTES
Physical Therapy Daily Note  [address] [place] [address]    VISIT#: 7    Subjective   Monika Lalum reports Thursday night she was up all night due to her heart rate being significantly elevated. Pt reports on Saturday she slept all day due to not sleeping Thursday night and trying to do more things on Friday. Pt reports on Monday she was able to go to the grocery store for 30 minutes and also able to have a friend come over for 1-1.5 hours. Pt reports she was really tired afterwards.      Objective     See Exercise, Manual, and Modality Logs for complete treatment.     Assessment/Plan    Able to progress patient's visual tracking and balance exercises today and patient did become more symptomatic with exercises and required more rest breaks in order to recover, but patient was able to complete. Pt is becoming much faster with completing visual tracking in room task. Will continue to progress patient as able.       Progress per Plan of Care and Progress strengthening /stabilization /functional activity            Timed:                 Manual Therapy:    0     mins  80923;     Therapeutic Exercise:    0     mins  26583;     Neuromuscular Go:    23    mins  36940;    Therapeutic Activity:     23     mins  92618;     Gait Trainin     mins  22047;     Ultrasound:     0     mins  75649;    Ionto                               0    mins   74384  Self pay                         0     mins [email or web address]    Un-Timed:  Electrical Stimulation:    0     mins  85703 ( )  Canalith Repos    0     mins 78924  Dry Needling     0     mins self-pay  Traction     0     mins 99464    Timed Treatment:   46   mins   Total Treatment:     46   mins    Adan Portillo PT  Physical Therapist    PT SIGNATURE: Electronically signed by Adan Portillo PT  [place] LICENSE: [id number]

## 2024-02-23 ENCOUNTER — OFFICE VISIT (OUTPATIENT)
Dept: CARDIOLOGY | Facility: CLINIC | Age: 44
End: 2024-02-23
Payer: COMMERCIAL

## 2024-02-23 ENCOUNTER — TREATMENT (OUTPATIENT)
Dept: PHYSICAL THERAPY | Facility: CLINIC | Age: 44
End: 2024-02-23
Payer: OTHER MISCELLANEOUS

## 2024-02-23 ENCOUNTER — PATIENT ROUNDING (BHMG ONLY) (OUTPATIENT)
Dept: CARDIOLOGY | Facility: CLINIC | Age: 44
End: 2024-02-23
Payer: COMMERCIAL

## 2024-02-23 VITALS
HEIGHT: 62 IN | DIASTOLIC BLOOD PRESSURE: 77 MMHG | BODY MASS INDEX: 23.37 KG/M2 | SYSTOLIC BLOOD PRESSURE: 114 MMHG | WEIGHT: 127 LBS | HEART RATE: 81 BPM

## 2024-02-23 DIAGNOSIS — R26.89 BALANCE DISORDER: ICD-10-CM

## 2024-02-23 DIAGNOSIS — R41.841 COGNITIVE COMMUNICATION DEFICIT: Primary | ICD-10-CM

## 2024-02-23 DIAGNOSIS — R00.2 PALPITATIONS: Primary | ICD-10-CM

## 2024-02-23 DIAGNOSIS — F07.81 POST CONCUSSIVE SYNDROME: ICD-10-CM

## 2024-02-23 PROCEDURE — 99203 OFFICE O/P NEW LOW 30 MIN: CPT | Performed by: SPECIALIST

## 2024-02-23 RX ORDER — METOPROLOL SUCCINATE 25 MG/1
25 TABLET, EXTENDED RELEASE ORAL DAILY
Qty: 90 TABLET | Refills: 3 | Status: SHIPPED | OUTPATIENT
Start: 2024-02-23

## 2024-02-23 NOTE — PROGRESS NOTES
Physical Therapy Daily Note  [address] [place] [address]    VISIT#: 7    Subjective   Monika Gu reports she continues to go out into stores and reports she was able to go to [facility] today for 30 minutes. Pt reports she still uses her headphones but reports she is becoming less symptomatic. Pt reports she is going to start a new betablocker that the cardiologist prescribed.       Objective     See Exercise, Manual, and Modality Logs for complete treatment.     Assessment/Plan    Able to significantly progress patient's exercises today and patient able to complete. Able to get patient into clinic today with more sounds and people and patient able to complete most of session in clinic. Pt did have moments of increase in symptoms and required rest breaks and/or breathing techniques. Pt did become emotional at end of therapy session with complex exercise but patient able to recover and complete exercise. Overall, patient did excellent today with making progress and will continue to progress patient as able.       Progress per Plan of Care and Progress strengthening /stabilization /functional activity            Timed:                 Manual Therapy:    0     mins  14584;     Therapeutic Exercise:    0     mins  46502;     Neuromuscular Go:    23    mins  06413;    Therapeutic Activity:     23     mins  20198;     Gait Trainin     mins  67212;     Ultrasound:     0     mins  03147;    Ionto                               0    mins   89057  Self pay                         0     mins [email or web address]    Un-Timed:  Electrical Stimulation:    0     mins  14981 ( )  Canalith Repos    0     mins 82040  Dry Needling     0     mins self-pay  Traction     0     mins 83421    Timed Treatment:   46   mins   Total Treatment:     46   mins    Adan Portillo PT  Physical Therapist    PT SIGNATURE: Electronically signed by Adan Portillo PT  [place] LICENSE: [id number]

## 2024-02-23 NOTE — PROGRESS NOTES
Outpatient Adult Speech Language Therapy           Treatment Note    Patient: Monika Gu                                                                                     Visit Date: 2024  :     1980    Referring practitioner:     Theodore Loyd MD  Date of Initial Visit:          24    Patient seen for 8 sessions    Visit Diagnoses:    ICD-10-CM ICD-9-CM   1. Cognitive communication deficit  R41.841 799.52           SUBJECTIVE     Patient attending [facility] Speech Therapy to increase memory recall and expressive/receptive language to safely, accurately and in a timely manner complete and communicate tasks during IALDs.    OBJECTIVE   GOALS    GOALS ACTIVITY PERFORMED TRIALS COMPLETED LEVEL OF CUEING REQUIRED    LTG 1: 12 weeks: Patient will increase Functional Communication Measure Score for memory to 6/7 decreasing limitation to 1-19%.            STG 1a: 12 weeks: Patient will demonstrate the ability to use appropriate memory strategies to encode novel and complex information with min cueing.   Memory strategies Patient reviewed five main memory strategies to encode information into memory with clinician.    No assist needed to review strategies.      STG 1b: 12 weeks:  Patient will complete moderately complex short term memory tasks with 85+% accuracy and/or min assist for strategy use.   Paragraph/ Short story Patient was read mod complex paragraph/ short story. Patient was asked to recall information and answer comprehension questions.  Trial 1: 3/5   Trial 2: 5/5  Trial 3: 8/8   Min assist   STG 1c:  12 weeks: Patient will complete immediate memory tasks with 85+% accuracy and min assist for strategy use.   Immediate recall  Patient was read mod complex paragraph/ short story and asked to retell story. Patient was able to accurately retell story and answer comprehension questions accurately.   Min assist        STG 2a:  12 weeks:  Patient will generative name in a broad category 15+ items in one-minute with min cueing for strategy use.  Generative naming Patient given broad category and asked to name at least 15 items in 1 minute.- Patient is having difficulties with increased heart rate which made timed task difficult.   - Actors: 3   Max assist to complete task.   STG 2b: 12 weeks:  Patient will complete word recall activities with 85+% accuracy and min assist.         STG 2c:  12 weeks:  Patient will complete auditory attention tasks with 85+% accuracy and min assist.   Paragraph/ Short story  Patient was read moderately complex paragraph and asked to retell story. Patient was able to complete task and sustain attention.     Min assist to complete auditory task.    STG 2d: 12 weeks:  Patient will describe/tell a story or event with min assist.   Paragraph/ Short story  Patient was read moderately complex paragraph. Patient was asked to retell information from story.   Patient was able to retell story without straying from topic with min assist.            Total Time of Visit:            11:30-12:05 35 mins         ASSESSMENT/PLAN     ASSESSMENT: Patient requires the skills of a therapist to provide reduced cueing to min to mod assist to use strategies to encode novel information into memory and for strategies for word recall during IADLs.   Progress: Patient is encoding information into memory using appropriate strategies and is demonstrating better immediate recall skills.   PLAN: Continue plan of care    Progress Note Due Date:02/29/24  Recertification Due Date:04/08/24    SIGNATURE: Angelica Wan, Speech Therapy Student,   Electronically Signed on 2/23/2024  Lori Celeste MS, CCC-SLP [place] License #: [id number]          Adult Outpatient Rehabiliation                                             [address] [place] [address]. [address]  [phone] Office  [phone] Fax

## 2024-02-23 NOTE — PROGRESS NOTES
[facility]   Cardiology Consult Note    Patient Name: Monika Gu  : 1980  Referring Physician: Tyrone Hagan MD  Subjective   Subjective     Reason for Consult/ Chief Complaint:   Chief Complaint   Patient presents with    Establish Care    Rapid Heart Rate    Palpitations       HPI:  Monika Gu is a 43 y.o. female with history of palpitations on and off for the last 6 months or so.  Increased recently.  Lasts a few minutes and occurs every day: More at nighttime and keeps her from sleep.  Palpitations occurs sometimes for hours affecting his sleep and her ADL.  No aggravating relieving factors.  Has history of concussion.  Could not tolerate propranolol because of headaches.    Review of Systems:    Constitutional no fever,  no weight loss   Skin no rash   Otolaryngeal no difficulty swallowing   Cardiovascular See HPI   Pulmonary no cough, no sputum production   Gastrointestinal no constipation, no diarrhea   Genitourinary no dysuria, no hematuria   Hematologic no easy bruisability, no abnormal bleeding   Musculoskeletal no muscle pain   Neurologic no dizziness, no falls       Personal History     Past Medical History:  Past Medical History:   Diagnosis Date    Abnormal ECG 2024    Sinus tachycardia    Allergic     Allergic rhinitis     Seasonal allergy symptoms all seasons    Arrhythmia     Arthritis     Cancer 3/19    Adenocarcinoma in situ    Concussion 10/2023    L FACIAL INJURY foot    Difficulty walking 10/25    Dizziness 2023    Concussion    Endometriosis     Fracture of nasal bones     Left side of bridge of nose    GERD (gastroesophageal reflux disease)     Headache     Headache, tension-type 10/25    History of medical problems     Hyperthyroidism     Memory loss 10/25    Migraine 10/25    Nosebleed     Dry weather and seasonal changes    Shingles     Syncope 10/25    Tinnitus 2023    Concussion       Family History:   Family History    Problem Relation Age of Onset    Diabetes Father     Migraines Father         Whole family    Heart disease Father     Diabetes Brother     Cancer Maternal Aunt     Hypertension Mother     Thyroid disease Mother     Migraines Mother         Whole family    Anemia Mother     Cancer Paternal Grandmother     Colon cancer Neg Hx        Social History:  reports that she has never smoked. She has never been exposed to tobacco smoke. She has never used smokeless tobacco. She reports that she does not currently use alcohol. She reports that she does not use drugs.    Home Medications:  Azelastine HCl, SUMAtriptan, dexlansoprazole, dicyclomine, estradiol, levothyroxine, multivitamin with minerals, propranolol, and vitamin D3    Allergies:  Allergies   Allergen Reactions    Amitriptyline Palpitations       Objective    Objective     Vitals:   Heart Rate:  [81] 81  BP: (114)/(77) 114/77  Body mass index is 23.1 kg/m².  PHYSICAL EXAM:    General Appearance:   well developed  well nourished  HENT:   oropharynx moist  lips not cyanotic  Neck:  thyroid not enlarged  supple  Respiratory:  no respiratory distress  normal breath sounds  no rales  Cardiovascular:  no jugular venous distention  regular rhythm  apical impulse normal  S1 normal, S2 normal  no S3, no S4   no murmur  no rub, no thrill  carotid pulses normal; no bruit  pedal pulses normal  lower extremity edema: none    Skin:   warm, dry  Psychiatric:  judgement and insight appropriate  normal mood and affect    RESULTS:    EKG reviewed by me and shows sinus rhythm       Result Review    Result Review:  I have personally reviewed the available results:  [x]  Laboratory  [x]  EKG/Telemetry   [x]  Cardiology/Vascular   [x] Medications  [x]  Old records      Procedures     Impression/Plan  1. Palpitations/sinus tachycardia: Discontinue propranolol and Toprol-XL 25 mg once a day.  48-hour Holter monitoring.  Echocardiogram.  Low caffeine diet advised.        Electronically  signed by Austin Meredith MD, 02/23/24, 9:06 AM EST.

## 2024-02-26 ENCOUNTER — TREATMENT (OUTPATIENT)
Dept: PHYSICAL THERAPY | Facility: CLINIC | Age: 44
End: 2024-02-26
Payer: OTHER MISCELLANEOUS

## 2024-02-26 DIAGNOSIS — R41.841 COGNITIVE COMMUNICATION DEFICIT: Primary | ICD-10-CM

## 2024-02-26 PROCEDURE — 97130 THER IVNTJ EA ADDL 15 MIN: CPT | Performed by: SPEECH-LANGUAGE PATHOLOGIST

## 2024-02-26 PROCEDURE — 97129 THER IVNTJ 1ST 15 MIN: CPT | Performed by: SPEECH-LANGUAGE PATHOLOGIST

## 2024-02-26 NOTE — PROGRESS NOTES
Outpatient Adult Speech Language Therapy           Treatment Note    Patient: Monika Gu                                                                                     Visit Date: 2024  :     1980    Referring practitioner:     Theodore Loyd MD  Date of Initial Visit:          24    Patient seen for 9 sessions    Visit Diagnoses:    ICD-10-CM ICD-9-CM   1. Cognitive communication deficit  R41.841 799.52             SUBJECTIVE     Patient attending [facility] Speech Therapy to increase memory recall and expressive/receptive language to safely, accurately and in a timely manner complete and communicate tasks during IALDs.    OBJECTIVE   GOALS    GOALS ACTIVITY PERFORMED TRIALS COMPLETED LEVEL OF CUEING REQUIRED    LTG 1: 12 weeks: Patient will increase Functional Communication Measure Score for memory to 6/7 decreasing limitation to 1-19%.            STG 1a: 12 weeks: Patient will demonstrate the ability to use appropriate memory strategies to encode novel and complex information with min cueing.   Memory strategies Patient reviewed five main memory strategies to encode information into memory with clinician.    No assist needed to review strategies.      STG 1b: 12 weeks:  Patient will complete moderately complex short term memory tasks with 85+% accuracy and/or min assist for strategy use.        STG 1c:  12 weeks: Patient will complete immediate memory tasks with 85+% accuracy and min assist for strategy use.   Immediate recall  Patient was read set of 3-4 words to encode into memory and recall. Patient was able to accurately recall each set of words with approximately 90% accuracy.   No-min assist       STG 2a:  12 weeks:  Patient will generative name in a broad category 15+ items in one-minute with min cueing for strategy use.  Generative naming Patient given 1 minute to name as many items in each category.    Places for Vacation: 15  Restaurant Names: 13  Tools: 8  Artist Names: 13   Min assist   STG 2b: 12 weeks:  Patient will complete word recall activities with 85+% accuracy and min assist. Word recall Synonym- 9/10 (90%)  Antonym- 10/10 (100%)   Min assist    STG 2c:  12 weeks:  Patient will complete auditory attention tasks with 85+% accuracy and min assist.   Word recall  Patient was read set of 3-4 words to recall and then asked to give synonym/ antonym for random target word from each set. Patient completed task with approximately 95% accuracy.      Min assist to complete auditory task.    STG 2d: 12 weeks:  Patient will describe/tell a story or event with min assist.   Topic cards Patient used topic cards to describe/ tell story. Topics included: childhood memory, travel, and career. Patient was able to maintain on topic while telling story with min assist.    Min assist           Total Time of Visit:            8:35-9:05 30 mins         ASSESSMENT/PLAN     ASSESSMENT: Patient requires the skills of a therapist to provide reduced cueing to min to mod assist to use strategies to encode novel information into memory and for strategies for word recall during IADLs.   Progress: Patient is encoding information into memory using appropriate strategies and is demonstrating better immediate recall skills.   PLAN: Continue plan of care    Progress Note Due Date:02/29/24  Recertification Due Date:04/08/24    SIGNATURE: Angelica Wan, Speech Therapy Student,   Electronically Signed on 2/26/2024  Lori Celeste MS, CCC-SLP [place] License #: [id number]          Adult Outpatient Rehabiliation                                             [address] [place] [address]. [address]  [phone] Office  [phone] Fax

## 2024-02-27 ENCOUNTER — TREATMENT (OUTPATIENT)
Dept: PHYSICAL THERAPY | Facility: CLINIC | Age: 44
End: 2024-02-27
Payer: OTHER MISCELLANEOUS

## 2024-02-27 DIAGNOSIS — R26.89 BALANCE DISORDER: ICD-10-CM

## 2024-02-27 DIAGNOSIS — F07.81 POST CONCUSSIVE SYNDROME: ICD-10-CM

## 2024-02-27 DIAGNOSIS — R41.841 COGNITIVE COMMUNICATION DEFICIT: Primary | ICD-10-CM

## 2024-02-27 NOTE — PROGRESS NOTES
Physical Therapy Daily Note  [address] [place] [address]    VISIT#: 11    Subjective   Monika Gu reports she went 6 days last week before having a bad day. Pt reports her stretches are getting longer. Pt reports her new beta blocker she is on is making her feel better.       Objective     See Exercise, Manual, and Modality Logs for complete treatment.     Assessment/Plan    Continued with visual tracking, visual scanning, and balance exercises and patient tolerated well with having some symptoms but able to take standing rest breaks or even able to push through the symptoms with some of the exercises today. Will continue to progress patient as able to improve functional mobility.       Progress per Plan of Care and Progress strengthening /stabilization /functional activity            Timed:                 Manual Therapy:    0     mins  94595;     Therapeutic Exercise:    0     mins  69411;     Neuromuscular Go:    23    mins  79818;    Therapeutic Activity:     15     mins  45651;     Gait Trainin     mins  69745;     Ultrasound:     0     mins  20319;    Ionto                               0    mins   37984  Self pay                         0     mins [email or web address]    Un-Timed:  Electrical Stimulation:    0     mins  72043 ( )  Canalith Repos    0     mins 60625  Dry Needling     0     mins self-pay  Traction     0     mins 73627    Timed Treatment:   38   mins   Total Treatment:     38   mins    Adan Portillo PT  Physical Therapist    PT SIGNATURE: Electronically signed by Adan Portillo PT  [place] LICENSE: [id number]

## 2024-02-28 ENCOUNTER — HOSPITAL ENCOUNTER (OUTPATIENT)
Dept: CARDIOLOGY | Facility: HOSPITAL | Age: 44
Discharge: HOME | End: 2024-02-28
Admitting: STUDENT IN AN ORGANIZED HEALTH CARE EDUCATION/TRAINING PROGRAM
Payer: OTHER MISCELLANEOUS

## 2024-02-28 DIAGNOSIS — R00.2 PALPITATION: ICD-10-CM

## 2024-02-28 PROCEDURE — 93226 XTRNL ECG REC<48 HR SCAN A/R: CPT

## 2024-02-28 PROCEDURE — 93225 XTRNL ECG REC<48 HRS REC: CPT

## 2024-02-29 RX ORDER — LEVOTHYROXINE SODIUM 0.05 MG/1
TABLET ORAL
Qty: 24 TABLET | Refills: 1 | Status: SHIPPED | OUTPATIENT
Start: 2024-02-29

## 2024-02-29 NOTE — TELEPHONE ENCOUNTER
Incoming Refill Request      Medication requested (name and dose): levothyroxine (SYNTHROID, LEVOTHROID) 75 MCG tablet     Pharmacy where request should be sent:     Additional details provided by patient:     Best call back number: [phone]     Does the patient have less than a 3 day supply:  [x] Yes  [] No    Lola Reis MA  02/29/24, 14:47 EST

## 2024-03-01 ENCOUNTER — TREATMENT (OUTPATIENT)
Dept: PHYSICAL THERAPY | Facility: CLINIC | Age: 44
End: 2024-03-01
Payer: OTHER MISCELLANEOUS

## 2024-03-01 DIAGNOSIS — R26.89 BALANCE DISORDER: Primary | ICD-10-CM

## 2024-03-01 DIAGNOSIS — F07.81 POST CONCUSSIVE SYNDROME: ICD-10-CM

## 2024-03-01 NOTE — PROGRESS NOTES
Physical Therapy Daily Note  [address] [place] [address]    VISIT#: 12    Subjective   Monika Gu reports she has had a few more bad days this week but reports overall she is still making progress. Pt reports she continues to be compliant with HEP.       Objective     See Exercise, Manual, and Modality Logs for complete treatment.     Assessment/Plan    Added several different visual scanning and cognitive tasks while balancing on airex today and patient tolerated well. Pt able to achieve visual scanning exercise in 2 minutes and 10 seconds the first trial and then with airex it took 2 minutes and 38 seconds. Pt did become symptomatic with visual scanning tasks, but did not become symptomatic with cognitive tasks. Will continue to progress patient as able. Discussed with patient to perform workout at gym where she lives before next session due to patient being able to perform a couple of exercises in gym in PT today for the first time. Pt reports she will perform.       Progress per Plan of Care and Progress strengthening /stabilization /functional activity            Timed:                 Manual Therapy:    0     mins  84390;     Therapeutic Exercise:    8     mins  72386;     Neuromuscular Go:    35    mins  33969;    Therapeutic Activity:     15     mins  05769;     Gait Trainin     mins  09088;     Ultrasound:     0     mins  04092;    Ionto                               0    mins   23647  Self pay                         0     mins [email or web address]    Un-Timed:  Electrical Stimulation:    0     mins  26379 ( )  Canalith Repos    0     mins 99156  Dry Needling     0     mins self-pay  Traction     0     mins 77092    Timed Treatment:   58   mins   Total Treatment:     58   mins    Adan Portillo PT  Physical Therapist    PT SIGNATURE: Electronically signed by Adan Portillo PT  [place] LICENSE: [id number]

## 2024-03-04 ENCOUNTER — TREATMENT (OUTPATIENT)
Dept: PHYSICAL THERAPY | Facility: CLINIC | Age: 44
End: 2024-03-04
Payer: OTHER MISCELLANEOUS

## 2024-03-04 ENCOUNTER — TELEPHONE (OUTPATIENT)
Dept: CARDIOLOGY | Facility: CLINIC | Age: 44
End: 2024-03-04
Payer: COMMERCIAL

## 2024-03-04 DIAGNOSIS — R41.841 COGNITIVE COMMUNICATION DEFICIT: Primary | ICD-10-CM

## 2024-03-04 RX ORDER — LEVOTHYROXINE SODIUM 0.07 MG/1
TABLET ORAL
Qty: 20 TABLET | Refills: 1 | Status: SHIPPED | OUTPATIENT
Start: 2024-03-04

## 2024-03-04 NOTE — TELEPHONE ENCOUNTER
SW patient. Patient with question if any medications need to be held prior to ECHO. Advised did not have to hold any medications.     Patient also c/o palpitations and heart racing that wakes her up at night. Patient takes her Toprol 25 mg HS. Patient states she only has 1 cup coffee in AM and no other caffeine. Patient states she also tried OTC magnesium and it ayala not help.     BP and HR WNL      Advised I would return call with recommendations.

## 2024-03-04 NOTE — TELEPHONE ENCOUNTER
Holter monitor not show any significant ectopy, would recommend waiting for echocardiogram for evaluation before making any other changes.

## 2024-03-04 NOTE — PROGRESS NOTES
Can we call Ms. Gu and let her know that the results of her Holter monitor were normal?  Looking at the report, palpitations did seem to have no correlation to heart rate or rhythm.    Thank you,    Tyrone Hagan    ?  This document has been electronically signed by Tyrone Hagan MD on March 4, 2024 08:28 EST

## 2024-03-04 NOTE — PROGRESS NOTES
Outpatient Adult Speech Language Therapy           Treatment Note/Progress Note    Patient: Monika Gu                                                                                     Visit Date: 3/4/2024  :     1980    Referring practitioner:     Theodore Loyd MD  Date of Initial Visit:          24    Patient seen for 10 sessions    Visit Diagnoses:    ICD-10-CM ICD-9-CM   1. Cognitive communication deficit  R41.841 799.52             SUBJECTIVE     [facility] to increase memory recall and expressive/receptive language to safely, accurately and in a timely manner complete and communicate tasks during IALDs.    OBJECTIVE   GOALS    GOALS ACTIVITY PERFORMED TRIALS COMPLETED LEVEL OF CUEING REQUIRED    LTG 1: 12 weeks: Patient will increase Functional Communication Measure Score for memory to 6/7 decreasing limitation to 1-19%.            STG 1a: 12 weeks: Patient will demonstrate the ability to use appropriate memory strategies to encode novel and complex information with min cueing.   Memory strategies  No assist needed to review strategies.   Goal met 24   STG 1b: 12 weeks:  Patient will complete moderately complex short term memory tasks with 85+% accuracy and/or min assist for strategy use.   Paragraph/ Short story    Patient was read mod complex paragraph/ short story. Patient was asked to recall information and answer comprehension questions.  Trial 1: 2/3   Trial 2: 2/2  Trial 3: /2  Trial 4: /3  Trial 5: /3    76% overall accuracy Min assist   STG 1c:  12 weeks: Patient will complete immediate memory tasks with 85+% accuracy and min assist for strategy use.   Immediate memory Mental manipulation: Opposites with a delay 20/30 66% Min to mod assist       STG 2a:  12 weeks:  Patient will generative name in a broad category 15+ items in one-minute with min cueing for strategy  use.  Generative naming Naming in a broad category during a one-minute trial- items in an airport, patient named 9 items    Naming in a category an item that begins with a certain letter: 9/10  Mod assist   STG 2b: 12 weeks:  Patient will complete word recall activities with 85+% accuracy and min assist. Word recall Mental manipulation: Naming opposites with a delay    20/30 with increased cognitive memory load   Min to mod asssist    STG 2c:  12 weeks:  Patient will complete auditory attention tasks with 85+% accuracy and min assist.   Paragraph/ Short story  Mental manipulation of an verbal stimulus and naming the opposites Min to mod assist t   STG 2d: 12 weeks:  Patient will describe/tell a story or event with min assist.   Paragraph/ Short story  Patient retold a childhood story Patient was able to retell story without straying from topic with min assist      Goal met 3/4/24.            Total Time of Visit:            15:05-15:50= 45 minutes         ASSESSMENT/PLAN     ASSESSMENT: Patient requires the skills of a therapist to provide reduced cueing to min to mod assist to use strategies to encode novel information into memory and for strategies for word recall during IADLs.   Progress: Patient is encoding information into memory using appropriate strategies and is demonstrating better immediate recall skills.   PLAN: Continue plan of care    Progress Note Due Date:03/29/24  Recertification Due Date:04/08/24    SIGNATURE: Lori Celeste, SLP,   Electronically Signed on 3/4/2024  Lori Celeste MS, CCC-SLP [place] License #: [id number]          Adult Outpatient Rehabiliation                                             [address] [place] [address]. [address]  [phone] Office  [phone] Fax

## 2024-03-05 ENCOUNTER — TREATMENT (OUTPATIENT)
Dept: PHYSICAL THERAPY | Facility: CLINIC | Age: 44
End: 2024-03-05
Payer: OTHER MISCELLANEOUS

## 2024-03-05 DIAGNOSIS — R26.89 BALANCE DISORDER: Primary | ICD-10-CM

## 2024-03-05 DIAGNOSIS — F07.81 POST CONCUSSIVE SYNDROME: ICD-10-CM

## 2024-03-05 NOTE — PROGRESS NOTES
Physical Therapy Daily Note  [address] [place] [address]    VISIT#: 13    Subjective   Monika Gu reports today is not a good day. Pt reports yesterday she also did not feel well. Pt reports she is still able to take her dog on walks and go to the store but otherwise is resting.       Objective     See Exercise, Manual, and Modality Logs for complete treatment.     Assessment/Plan    Performed visual scanning and cognitive exercises today and patient was able to tolerate until she got fatigued and then patient became symptomatic with increase in heart rate, perspiration, and even had double vision at one point. Pt became the most symptomatic when the exercise changed visual and directional movement, in addition to have additional auditory stimulation. Will continue to progress patient as able. Discussed with patient to rest the remainder of the day.     Progress per Plan of Care and Progress strengthening /stabilization /functional activity            Timed:                 Manual Therapy:    0     mins  16578;     Therapeutic Exercise:    0     mins  05906;     Neuromuscular Go:    45    mins  38608;    Therapeutic Activity:     0     mins  99067;     Gait Trainin     mins  81904;     Ultrasound:     0     mins  95644;    Ionto                               0    mins   54415  Self pay                         0     mins [email or web address]    Un-Timed:  Electrical Stimulation:    0     mins  66338 ( )  Canalith Repos    0     mins 52365  Dry Needling     0     mins self-pay  Traction     0     mins 06335    Timed Treatment:   45   mins   Total Treatment:     45   mins    Adan Portillo PT  Physical Therapist    PT SIGNATURE: Electronically signed by Adan Portillo PT  [place] LICENSE: [id number]

## 2024-03-07 ENCOUNTER — OFFICE VISIT (OUTPATIENT)
Dept: FAMILY MEDICINE CLINIC | Facility: CLINIC | Age: 44
End: 2024-03-07
Payer: COMMERCIAL

## 2024-03-07 ENCOUNTER — TELEPHONE (OUTPATIENT)
Dept: PHYSICAL THERAPY | Facility: CLINIC | Age: 44
End: 2024-03-07

## 2024-03-07 VITALS
DIASTOLIC BLOOD PRESSURE: 55 MMHG | OXYGEN SATURATION: 100 % | BODY MASS INDEX: 22.68 KG/M2 | WEIGHT: 128 LBS | HEIGHT: 63 IN | SYSTOLIC BLOOD PRESSURE: 125 MMHG | HEART RATE: 81 BPM | TEMPERATURE: 98.7 F

## 2024-03-07 DIAGNOSIS — G44.83 HEADACHE AFTER COUGH: ICD-10-CM

## 2024-03-07 DIAGNOSIS — U07.1 COVID-19 VIRUS INFECTION: Primary | ICD-10-CM

## 2024-03-07 DIAGNOSIS — R52 BODY ACHES: ICD-10-CM

## 2024-03-07 DIAGNOSIS — R50.9 CHILLS WITH FEVER: ICD-10-CM

## 2024-03-07 LAB
EXPIRATION DATE: ABNORMAL
FLUAV AG UPPER RESP QL IA.RAPID: NOT DETECTED
FLUBV AG UPPER RESP QL IA.RAPID: NOT DETECTED
INTERNAL CONTROL: ABNORMAL
Lab: ABNORMAL
SARS-COV-2 AG UPPER RESP QL IA.RAPID: DETECTED

## 2024-03-07 PROCEDURE — 99213 OFFICE O/P EST LOW 20 MIN: CPT | Performed by: STUDENT IN AN ORGANIZED HEALTH CARE EDUCATION/TRAINING PROGRAM

## 2024-03-07 PROCEDURE — 87428 SARSCOV & INF VIR A&B AG IA: CPT | Performed by: STUDENT IN AN ORGANIZED HEALTH CARE EDUCATION/TRAINING PROGRAM

## 2024-03-07 RX ORDER — GUAIFENESIN AND DEXTROMETHORPHAN HYDROBROMIDE 600; 30 MG/1; MG/1
1 TABLET, EXTENDED RELEASE ORAL 2 TIMES DAILY PRN
Qty: 8 TABLET | Refills: 0 | Status: SHIPPED | OUTPATIENT
Start: 2024-03-07

## 2024-03-07 NOTE — PROGRESS NOTES
Subjective:       Monika Gu is a 43 y.o. female who presents for subjective fever and chills.    Ms. Gu presents with subjective fever and chills.  Associated symptoms include stiff neck and bodyaches.  She has a history of postconcussive syndrome and her symptoms are worse with the current medication including balance issues, earache, congestion and painful headache.    Physical exam and vital signs are reassuring.    Point-of-care COVID-19 testing was obtained and was positive.          We discussed symptomatic relief including Tylenol and Motrin, Mucinex DM for congestion and dry cough.    I reviewed the most recent guidelines for indications for treatment in the outpatient setting.  This to be recommended for adults who are at increased risk for progression to severe disease.  Although she is less than 50 years old and is not obese, she does have 1 risk factor for progression to severe COVID-19 in the form of neurologic condition as she is currently being treated for a very severe case of longstanding postconcussive syndrome involving neurology, speech therapy.  She also has a history of migraines.  Mood disorder is also comorbidity that the CDC classifies as a risk factor for severe COVID-19 and her mood has been impacted by her postconcussive syndrome.    Given this, I do think that she would be a candidate for Paxlovid.  We did discuss benefits and risk of treatment, including side effect profile.  More importantly, I went through her list of medications and do not see any interaction with her active medications.  After discussion and shared decision making, Ms. Gu requested I send in Paxlovid to prevent progression to severe COVID-19 and I have done this today.    Will have her follow-up as previously scheduled but did discuss that if she develops severe symptoms of COVID-19 infection, to present for more urgent evaluation.    The following portions of the patient's history were reviewed and  updated as appropriate: allergies, current medications, past family history, past medical history, past social history, past surgical history, and problem list.    Past Medical Hx:  Past Medical History:   Diagnosis Date    Abnormal ECG 2/2/2024    Sinus tachycardia    Allergic     Allergic rhinitis 2002    Seasonal allergy symptoms all seasons    Arrhythmia     Arthritis     Cancer 3/19    Adenocarcinoma in situ    Concussion 10/2023    L FACIAL INJURY foot    Difficulty walking 10/25    Dizziness October 26, 2023    Concussion    Endometriosis     Fracture of nasal bones 1996    Left side of bridge of nose    GERD (gastroesophageal reflux disease)     Headache     Headache, tension-type 10/25    History of medical problems     Hyperthyroidism     Memory loss 10/25    Migraine 10/25    Nosebleed 2002    Dry weather and seasonal changes    Shingles     Syncope 10/25    Tinnitus October 26, 2023    Concussion       Past Surgical Hx:  Past Surgical History:   Procedure Laterality Date    APPENDECTOMY      HYSTERECTOMY      OOPHORECTOMY  2019    ADENOCARCINOMA    TONSILLECTOMY         Current Meds:    Current Outpatient Medications:     Azelastine HCl 137 MCG/SPRAY solution, by Each Nare route 2 (Two) Times a Day As Needed. USE 2 SPRAYS PER NOSTRIL TWICE A DAY AS NEEDED, Disp: , Rfl:     dexlansoprazole (DEXILANT) 60 MG capsule, Take 1 capsule by mouth Daily., Disp: , Rfl:     dicyclomine (BENTYL) 10 MG capsule, Take 1 capsule by mouth 4 (Four) Times a Day Before Meals & at Bedtime. (Patient taking differently: Take 1 capsule by mouth 4 (Four) Times a Day Before Meals & at Bedtime. prn), Disp: 16 capsule, Rfl: 0    estradiol (ESTRACE) 0.1 MG/GM vaginal cream, Insert 2 applicators into the vagina Daily., Disp: , Rfl:     guaifenesin-dextromethorphan 600-30 mg (MUCINEX DM)  MG tablet sustained-release 12 hour, Take 1 tablet by mouth 2 (Two) Times a Day As Needed (cough)., Disp: 8 tablet, Rfl: 0    levothyroxine  (SYNTHROID, LEVOTHROID) 50 MCG tablet, Patient taking this dose 2 times a week in the 75 mcg once a week historically, Disp: 24 tablet, Rfl: 1    levothyroxine (SYNTHROID, LEVOTHROID) 75 MCG tablet, TAKE 1 TABLET BY MOUTH ONE DAY A WEEK IN THE MORNING, Disp: 20 tablet, Rfl: 1    metoprolol succinate XL (TOPROL-XL) 25 MG 24 hr tablet, Take 1 tablet by mouth Daily., Disp: 90 tablet, Rfl: 3    multivitamin with minerals tablet tablet, Take 1 tablet by mouth Daily., Disp: , Rfl:     Nirmatrelvir & Ritonavir, 300mg/100mg, (PAXLOVID), Take 3 tablets by mouth 2 (Two) Times a Day for 5 days., Disp: 30 tablet, Rfl: 0    SUMAtriptan (IMITREX) 25 MG tablet, Take 1 tablet by mouth 1 (One) Time., Disp: , Rfl:     vitamin D3 125 MCG (5000 UT) capsule capsule, Take 1 capsule by mouth Daily., Disp: , Rfl:     Allergies:  Allergies   Allergen Reactions    Amitriptyline Palpitations       Family Hx:  Family History   Problem Relation Age of Onset    Diabetes Father     Migraines Father         Whole family    Heart disease Father     Diabetes Brother     Cancer Maternal Aunt     Hypertension Mother     Thyroid disease Mother     Migraines Mother         Whole family    Anemia Mother     Cancer Paternal Grandmother     Colon cancer Neg Hx         Social History:  Social History     Socioeconomic History    Marital status: Single   Tobacco Use    Smoking status: Never     Passive exposure: Never    Smokeless tobacco: Never   Vaping Use    Vaping status: Never Used   Substance and Sexual Activity    Alcohol use: Not Currently    Drug use: Never    Sexual activity: Not Currently     Partners: Male       Review of Systems  Review of Systems   Constitutional:  Positive for chills and fever.   HENT:  Positive for congestion and ear pain.    Respiratory:  Positive for cough. Negative for shortness of breath.    Musculoskeletal:  Positive for myalgias.   Neurological:  Positive for headaches.       Objective:     /55   Pulse 81   Temp  "98.7 °F (37.1 °C)   Ht 159.3 cm (62.71\")   Wt 58.1 kg (128 lb)   SpO2 100%   BMI 22.88 kg/m²   Physical Exam  Constitutional:       General: She is not in acute distress.     Appearance: Normal appearance. She is obese. She is not ill-appearing, toxic-appearing or diaphoretic.   HENT:      Right Ear: Tympanic membrane, ear canal and external ear normal.      Left Ear: Tympanic membrane, ear canal and external ear normal.   Pulmonary:      Effort: Pulmonary effort is normal. No respiratory distress.      Breath sounds: Normal breath sounds. No stridor. No wheezing, rhonchi or rales.   Neurological:      Mental Status: She is alert.          Assessment/Plan:     Diagnoses and all orders for this visit:    1. COVID-19 virus infection (Primary)    Ms. Gu presents with subjective fever and chills.  Associated symptoms include stiff neck and bodyaches.  She has a history of postconcussive syndrome and her symptoms are worse with the current medication including balance issues, earache, congestion and painful headache.    Physical exam and vital signs are reassuring.    Point-of-care COVID-19 testing was obtained and was positive.          We discussed symptomatic relief including Tylenol and Motrin, Mucinex DM for congestion and dry cough.    I reviewed the most recent guidelines for indications for treatment in the outpatient setting.  This to be recommended for adults who are at increased risk for progression to severe disease.  Although she is less than 50 years old and is not obese, she does have 1 risk factor for progression to severe COVID-19 in the form of neurologic condition as she is currently being treated for a very severe case of longstanding postconcussive syndrome involving neurology, speech therapy.  She also has a history of migraines.  Mood disorder is also comorbidity that the CDC classifies as a risk factor for severe COVID-19 and her mood has been impacted by her postconcussive " syndrome.    Given this, I do think that she would be a candidate for Paxlovid.  We did discuss benefits and risk of treatment, including side effect profile.  More importantly, I went through her list of medications and do not see any interaction with her active medications.  After discussion and shared decision making, Ms. Gu requested I send in Paxlovid to prevent progression to severe COVID-19 and I have done this today.    Will have her follow-up as previously scheduled but did discuss that if she develops severe symptoms of COVID-19 infection, to present for more urgent evaluation.      -     Nirmatrelvir & Ritonavir, 300mg/100mg, (PAXLOVID); Take 3 tablets by mouth 2 (Two) Times a Day for 5 days.  Dispense: 30 tablet; Refill: 0  -     guaifenesin-dextromethorphan 600-30 mg (MUCINEX DM)  MG tablet sustained-release 12 hour; Take 1 tablet by mouth 2 (Two) Times a Day As Needed (cough).  Dispense: 8 tablet; Refill: 0    2. Headache after cough  -     POCT SARS-CoV-2 Antigen ESTEE + Flu    3. Body aches  -     POCT SARS-CoV-2 Antigen ESTEE + Flu    4. Chills with fever  -     POCT SARS-CoV-2 Antigen ESTEE + Flu          Rx changes: Mucinex DM and Paxlovid      Follow-up:     Return if symptoms worsen or fail to improve, for Next scheduled follow up.    Preventative:  Health Maintenance   Topic Date Due    HEPATITIS C SCREENING  Never done    ANNUAL PHYSICAL  Never done    INFLUENZA VACCINE  03/08/2024 (Originally 8/1/2023)    TDAP/TD VACCINES (3 - Tdap) 09/29/2033    COVID-19 Vaccine  Completed    Pneumococcal Vaccine 0-64  Aged Out           This document has been electronically signed by Tyrone Hagan MD on March 7, 2024 09:50 EST       Parts of this note are electronic transcriptions/translations of spoken language to printed text using the Dragon Dictation system.

## 2024-03-12 RX ORDER — DEXLANSOPRAZOLE 60 MG/1
60 CAPSULE, DELAYED RELEASE ORAL DAILY
Qty: 90 CAPSULE | Refills: 1 | Status: SHIPPED | OUTPATIENT
Start: 2024-03-12

## 2024-03-12 NOTE — TELEPHONE ENCOUNTER
Caller: Monika Gu    Relationship: Self    Best call back number: [phone]     Requested Prescriptions:   Requested Prescriptions     Pending Prescriptions Disp Refills    dexlansoprazole (DEXILANT) 60 MG capsule       Sig: Take 1 capsule by mouth Daily.        Pharmacy where request should be sent: [facility]/PHARMACY #[id number] - [place], [place] - [address] [facility] - [phone]  - [phone] FX     Last office visit with prescribing clinician: 3/7/2024   Last telemedicine visit with prescribing clinician: 2/5/2024   Next office visit with prescribing clinician: 8/5/2024     Additional details provided by patient: PATIENT HAS BEEN OUT OF MEDICATION FOR 1 WEEK AND SHE CURRENTLY HAS COVID SO SHE FEELS WORSE.     Does the patient have less than a 3 day supply:  [x] Yes  [] No    Would you like a call back once the refill request has been completed: [] Yes [] No    If the office needs to give you a call back, can they leave a voicemail: [] Yes [] No    Pepper Patton Rep   03/12/24 12:54 EDT

## 2024-03-13 ENCOUNTER — TELEPHONE (OUTPATIENT)
Dept: FAMILY MEDICINE CLINIC | Facility: CLINIC | Age: 44
End: 2024-03-13
Payer: COMMERCIAL

## 2024-03-13 NOTE — TELEPHONE ENCOUNTER
[employer] RECEIVED APPROVAL FOR DEXILANT   PT NOTIFIED VIA MY CHART   Approved today  CaseId:[id number];Status:Approved;Review Type:Prior Auth;Coverage Start Date:02/12/2024;Coverage End Date:03/13/2025;  Authorization Expiration Date: 3/12/2025

## 2024-03-14 ENCOUNTER — TREATMENT (OUTPATIENT)
Dept: PHYSICAL THERAPY | Facility: CLINIC | Age: 44
End: 2024-03-14
Payer: OTHER MISCELLANEOUS

## 2024-03-14 DIAGNOSIS — R26.89 BALANCE DISORDER: Primary | ICD-10-CM

## 2024-03-14 DIAGNOSIS — F07.81 POST CONCUSSIVE SYNDROME: ICD-10-CM

## 2024-03-14 NOTE — PROGRESS NOTES
Progress Assessment  [address] [place] [id number]        Patient: Monika Gu   : 1980  Diagnosis/ICD-10 Code:  Balance disorder [R26.89]  Referring practitioner: Theodore Loyd MD  Date of Initial Visit: Type: THERAPY  Noted: 2024  Today's Date: 3/14/2024  Patient seen for 14 sessions      Subjective:   Monika Gu reports: 3/10 pain in her head   Subjective Questionnaire: Optimal: 35/80  Clinical Progress: improved  Home Program Compliance: Yes  Treatment has included: therapeutic exercise, neuromuscular re-education, manual therapy, therapeutic activity, and gait training    Subjective Pt reports that she had to miss the last week of therapy due to having Covid. Pt reports prior to getting Covid she was feeling better and starting to go to the stores for longer periods of time before having to leave due to becoming symptomatic. Pt reports she is still sensitive to sound and light. Pt reports she still has difficulty with scanning a room, changes in direction, and watching TV. Pt reports she has her vestibular testing on . Pt reports although she is feeling better due to being sick, she still does not feel 100% back to self.       Objective     Functional Assessment      Comments  Feet together eyes open: 30 seconds  Feet together eyes closed:30 seconds (increase in sway forward and backwards but no LOB)  Tandem with R in front: 30 seconds - increase in lateral sway  Tandem with L in front: 30 seconds - increase in lateral sway  SLS on L LE: 30 seconds increase in ankle strategy   SLS on R LE: 30 seconds  increase in ankle strategy      Pt requires private room due to sound in clinic increasing symptoms     Cervical     Cervical flexion: 65 degrees  Cervical Extension: 60 degrees   Cervical SB R: 35 degrees with pain  Cervical SB L: 40 degrees  Cervical Rotation R: 65 degrees  Cervical Rotation L: 65 degrees increase in pain     Did not perform FGA today due to patient not  feeling well. Will attempt at next therapy session.    Assessment/Plan    Despite patient not feeling back to self due to being sick, patient still made excellent progress with improving balance testing. Pt has now been able to perform all balance testing for 30 seconds but does still have either significant increase in sway and/or increase in ankle strategy. Pt did not become symptomatic with balance testing today. Will be progressing patient's balance testing goals. Pt did make progress with improving cervical ROM as well since last progress note, but does still have increase in pain with R cervical SB and L rotation. Due to patient not feeling well, did not perform FGA today. Will attempt to perform next therapy session as long as patient is feeling better. Will continue with PT in order to address deficits and to continue to address patient's difficulty with scanning rooms and difficulty with lights and sounds. Will continue to progress patient as able.       Goals  Plan Goals: 1. Pt has difficulty ambulating in environment.     1. LTG 1: 12 weeks: Pt will be able to ambulate in store for 30 minutes with minimal to no signs and symptoms of nausea.     STATUS: IN PROGRESS     STG 1: 6 weeks: Pt will be able to ambulate throughout clinic with minimal to no signs and symptoms of nausea for a total of 15 minutes.      STATUS: IN PROGRESS     2. The patient has gait dysfunction.     LTG 2: 12 weeks:  The patient will demonstrate > 22 / 30 on the Functional Gait Assessment for improved functional mobility.      STATUS:  IN PROGRESS     STG 2: The patient will demonstrate > 18 / 30 on the Functional Gait Assessment for improved functional mobility.      STATUS:  IN PROGRESS     3. The patient has difficulty with balance.      LTG 3: 12 weeks: The patient will hold the sharpened romberg position with eyes closed for 20 seconds and SLS with EC for 10 seconds in order to improve balance and decrease risk of falling.                              STATUS: MET - PROGRESSED      STG 3: 6 weeks: The patient will hold the romberg position with eyes opened for 10 seconds in order to improve balance and decrease risk of falling.                             STATUS: MET     LTG 4: Pt will be able to perform all directions of visual tracking test with minimal to no signs and symptoms of nausea in order to allow for patient to scan hallways at work.      STATUS: IN PROGRESS  Progress toward previous goals: Partially Met    See Exercise, Manual, and Modality Logs for complete treatment.         Recommendations: Continue as planned  Timeframe:2x a week for 6 weeks  Prognosis to achieve goals: good    PT SIGNATURE: Electronically signed by Adan Portillo, PT  [place] LICENSE: [id number]    Based upon review of the patient's progress and continued therapy plan, it is my medical opinion that Monika Gu should continue physical therapy treatment at [facility]  [address] [facility]  [place] [address] [phone].      Timed:                 Manual Therapy:    0     mins  62657;     Therapeutic Exercise:    10     mins  31946;     Neuromuscular Go:    10    mins  28890;    Therapeutic Activity:     10     mins  30678;     Gait Trainin     mins  47733;     Ultrasound:     0     mins  22519;    Ionto                               0    mins   20878  Self pay                         0     mins [id number]    Un-Timed:  Electrical Stimulation:    0     mins  48880 ( )  Canalith Repos    0     mins 06601  Dry Needling     0     mins self-pay  Traction     0     mins 20442    Timed Treatment:   30   mins   Total Treatment:     30   mins      I certify that the therapy services are furnished while this patient is under my care.  The services outlined above are required by this patient, and will be reviewed every 90 days.

## 2024-03-18 ENCOUNTER — TELEPHONE (OUTPATIENT)
Dept: PHYSICAL THERAPY | Facility: OTHER | Age: 44
End: 2024-03-18
Payer: COMMERCIAL

## 2024-03-18 NOTE — TELEPHONE ENCOUNTER
Caller: Monika Gu    Relationship: Self    What was the call regarding: PATIENT CANCELLED APPT TODAY DUE TO NOT FEELING WELL

## 2024-03-19 ENCOUNTER — TELEPHONE (OUTPATIENT)
Dept: FAMILY MEDICINE CLINIC | Facility: CLINIC | Age: 44
End: 2024-03-19
Payer: COMMERCIAL

## 2024-03-19 NOTE — TELEPHONE ENCOUNTER
Caller: Monika Gu    Relationship to patient: Self    Best call back number: [phone]    Patient is needing: PATIENT CALLED REQUESTING TO SPEAK WITH CLINICAL STAFF. PATIENT STATES SHE WAS SE[facility] AT [facility] [place] [facility] AND WAS DIAGNOSED WITH ACUTE SINUS INFECTION. PATIENT STATES SHE HAS BEEN TAKING THE MEDICATION THEY PRESCRIBED BUT IS CONTINUING TO COUGH. PATIENT THINKS ALL THE CONGESTED HAS MOVED DOWN INTO HER CHEST AND IS CONTINUING TO COUGH ALL DAY. PATIENT STATES URGENT [facility]ARE TOLD HER TO PLEASE REACH OUT TO HER PCP IF SYMPTOMS CONTINUED. PATIENT WANTING TO KNOW WHAT MD ROSALES RECOMMENDS SHE DO.

## 2024-03-19 NOTE — TELEPHONE ENCOUNTER
Lets get her in on Thursday or Friday for an appointment.      This document has been electronically signed by Tyrone Hagan MD on March 19, 2024 16:08 EDT

## 2024-03-21 ENCOUNTER — OFFICE VISIT (OUTPATIENT)
Dept: FAMILY MEDICINE CLINIC | Facility: CLINIC | Age: 44
End: 2024-03-21
Payer: COMMERCIAL

## 2024-03-21 ENCOUNTER — TELEPHONE (OUTPATIENT)
Dept: GASTROENTEROLOGY | Facility: CLINIC | Age: 44
End: 2024-03-21
Payer: COMMERCIAL

## 2024-03-21 ENCOUNTER — TELEPHONE (OUTPATIENT)
Dept: FAMILY MEDICINE CLINIC | Facility: CLINIC | Age: 44
End: 2024-03-21

## 2024-03-21 ENCOUNTER — HOSPITAL ENCOUNTER (OUTPATIENT)
Dept: GENERAL RADIOLOGY | Facility: HOSPITAL | Age: 44
Discharge: HOME | End: 2024-03-21
Admitting: STUDENT IN AN ORGANIZED HEALTH CARE EDUCATION/TRAINING PROGRAM
Payer: COMMERCIAL

## 2024-03-21 VITALS
OXYGEN SATURATION: 100 % | BODY MASS INDEX: 23.19 KG/M2 | DIASTOLIC BLOOD PRESSURE: 67 MMHG | HEIGHT: 62 IN | HEART RATE: 67 BPM | WEIGHT: 126 LBS | SYSTOLIC BLOOD PRESSURE: 105 MMHG

## 2024-03-21 DIAGNOSIS — R05.2 SUBACUTE COUGH: Primary | ICD-10-CM

## 2024-03-21 DIAGNOSIS — R05.8 POST-VIRAL COUGH SYNDROME: ICD-10-CM

## 2024-03-21 DIAGNOSIS — U07.1 COVID-19 VIRUS INFECTION: ICD-10-CM

## 2024-03-21 DIAGNOSIS — R05.2 SUBACUTE COUGH: ICD-10-CM

## 2024-03-21 PROCEDURE — 71046 X-RAY EXAM CHEST 2 VIEWS: CPT

## 2024-03-21 PROCEDURE — 99213 OFFICE O/P EST LOW 20 MIN: CPT | Performed by: STUDENT IN AN ORGANIZED HEALTH CARE EDUCATION/TRAINING PROGRAM

## 2024-03-21 RX ORDER — IPRATROPIUM BROMIDE 17 UG/1
2 AEROSOL, METERED RESPIRATORY (INHALATION)
Qty: 12.9 G | Refills: 2 | Status: SHIPPED | OUTPATIENT
Start: 2024-03-21

## 2024-03-21 RX ORDER — GUAIFENESIN AND DEXTROMETHORPHAN HYDROBROMIDE 600; 30 MG/1; MG/1
1 TABLET, EXTENDED RELEASE ORAL 2 TIMES DAILY PRN
Qty: 8 TABLET | Refills: 0 | Status: SHIPPED | OUTPATIENT
Start: 2024-03-21

## 2024-03-21 RX ORDER — BENZONATATE 100 MG/1
100 CAPSULE ORAL 3 TIMES DAILY PRN
Qty: 12 CAPSULE | Refills: 0 | Status: SHIPPED | OUTPATIENT
Start: 2024-03-21

## 2024-03-21 NOTE — TELEPHONE ENCOUNTER
I am not aware of an alternative inhaler for postviral cough.  It is there a prior authorization or an appeal we might need to file with insurance?    Thank you,    Tyrone Hagan      This document has been electronically signed by Tyrone Hagan MD on March 21, 2024 13:32 EDT

## 2024-03-21 NOTE — TELEPHONE ENCOUNTER
Caller: Monika Gu    Relationship: Self    Best call back number: [phone]    What medication are you requesting: ALTERNATIVE TO     ipratropium (Atrovent HFA) 17 MCG/ACT inhaler     If a prescription is needed, what is your preferred pharmacy and phone number: [facility]/PHARMACY #[id number] - [place], [place] - [address] [facility] [phone] [facility] [phone] FX     Additional notes:  PATIENT WENT TO PHARMACY TO  INHALER THAT WAS PRESCRIBED TODAY, 03/21/2024, BUT THE OUT OF POCKET PRICE IS VERY EXPENSIVE. SHE IS REQUESTING AN ALTERNATIVE THAT WOULD BE MUCH CHEAPER.

## 2024-03-21 NOTE — PROGRESS NOTES
Chest XR shows no pneumonia. Can we let her know?    Thank you,    Tyrone Hagan    ?  This document has been electronically signed by Tyrone Hagan MD on March 21, 2024 12:58 EDT

## 2024-03-21 NOTE — TELEPHONE ENCOUNTER
Atrovent HFA 17MCG/ACT aerosol Not Required    WHAT PT WAS WANTING TO KNOW IS HER PHARMACY [facility] IS OVER $100 FOR THIS INHALER BECAUSE SHE DOES NOT PNEUMONIA DOES SHE NEED TO GET THIS INHALER OR CAN SHE GO WITHOUT

## 2024-03-21 NOTE — PROGRESS NOTES
Subjective:       Monika Gu is a 43 y.o. female who presents for urgent care follow-up for cough.    I treated Ms. Gu for acute COVID-19 infection earlier this month on 3/7/2024.  At that time, principal symptoms included fever, chills, congestion, and cough.  See my office note from the 3/7/2024 counter for full details.  In summary, I treated her with Paxlovid as well as symptomatic treatment with Tylenol and Motrin and Mucinex DM.    Today, she tells me she did take the Paxlovid. Tolerated it well.     She was then seen by urgent care on Sunday of last week and diagnosed with sinus infection. At that time, she was treated with Stahist, Augmentin, and prednisone for 10 days. Still taking Augmentin and prednisone.     She subsequently messaged our staff on 3/19/2024 stating that she had a persistent cough despite use of medications.  She had said that urgent care physician had requested for her to follow-up with PCP if symptoms continued.  Based on this, we had asked her to follow-up with us today.    Today, she reports continued bothersome cough. See review of systems for further details, including pertinent negatives and positives.    Vital signs are reassuring.  She is normotensive and oxygen saturation is 100% on room air.    On physical exam, she is resting comfortably and is not in any acute distress.  Lungs are clear to auscultation but she coughs throughout encounter.    I have counseled her to continue to take the medications prescribed by urgent care.  I would also obtain a chest x-ray to rule out pneumonia given history of COVID-19 viral infection with persistent cough.  If this test is negative, she most likely has a postviral cough, which is very common after viral infections and can persist for up to 6 weeks or longer in some cases.  I will prescribe Tessalon Perles and Mucinex DM to help ameliorate symptoms but as far as treating postviral cough, more recent literature does suggest that  an Atrovent inhaler (inhaler normally used in the setting of COPD but also used in setting of postviral cough) could be used for this purpose.  I will prescribe this today for use for approximately 1 month.    She can follow-up as needed or if symptoms fail to improve.  Otherwise, she can follow-up at her prescheduled appointment.        The following portions of the patient's history were reviewed and updated as appropriate: allergies, current medications, past family history, past medical history, past social history, past surgical history, and problem list.    Past Medical Hx:  Past Medical History:   Diagnosis Date    Abnormal ECG 2/2/2024    Sinus tachycardia    Allergic     Allergic rhinitis 2002    Seasonal allergy symptoms all seasons    Arrhythmia     Arthritis     Cancer 3/19    Adenocarcinoma in situ    Concussion 10/2023    L FACIAL INJURY foot    Difficulty walking 10/25    Dizziness October 26, 2023    Concussion    Endometriosis     Fracture of nasal bones 1996    Left side of bridge of nose    GERD (gastroesophageal reflux disease)     Headache     Headache, tension-type 10/25    History of medical problems     Hyperthyroidism     Memory loss 10/25    Migraine 10/25    Nosebleed 2002    Dry weather and seasonal changes    Shingles     Syncope 10/25    Tinnitus October 26, 2023    Concussion       Past Surgical Hx:  Past Surgical History:   Procedure Laterality Date    APPENDECTOMY      HYSTERECTOMY      OOPHORECTOMY  2019    ADENOCARCINOMA    TONSILLECTOMY         Current Meds:    Current Outpatient Medications:     amoxicillin-clavulanate (AUGMENTIN) 875-125 MG per tablet, Take 1 tablet by mouth Every 12 (Twelve) Hours., Disp: 14 tablet, Rfl: 0    Azelastine HCl 137 MCG/SPRAY solution, by Each Nare route 2 (Two) Times a Day As Needed. USE 2 SPRAYS PER NOSTRIL TWICE A DAY AS NEEDED, Disp: , Rfl:     dexlansoprazole (DEXILANT) 60 MG capsule, Take 1 capsule by mouth Daily., Disp: 90 capsule, Rfl: 1     dicyclomine (BENTYL) 10 MG capsule, Take 1 capsule by mouth 4 (Four) Times a Day Before Meals & at Bedtime. (Patient taking differently: Take 1 capsule by mouth 4 (Four) Times a Day Before Meals & at Bedtime. prn), Disp: 16 capsule, Rfl: 0    estradiol (ESTRACE) 0.1 MG/GM vaginal cream, Insert 2 g into the vagina Daily., Disp: , Rfl:     guaifenesin-dextromethorphan 600-30 mg (MUCINEX DM)  MG tablet sustained-release 12 hour, Take 1 tablet by mouth 2 (Two) Times a Day As Needed (cough)., Disp: 8 tablet, Rfl: 0    levothyroxine (SYNTHROID, LEVOTHROID) 50 MCG tablet, Patient taking this dose 2 times a week in the 75 mcg once a week historically, Disp: 24 tablet, Rfl: 1    levothyroxine (SYNTHROID, LEVOTHROID) 75 MCG tablet, TAKE 1 TABLET BY MOUTH ONE DAY A WEEK IN THE MORNING, Disp: 20 tablet, Rfl: 1    metoprolol succinate XL (TOPROL-XL) 25 MG 24 hr tablet, Take 1 tablet by mouth Daily., Disp: 90 tablet, Rfl: 3    multivitamin with minerals tablet tablet, Take 1 tablet by mouth Daily., Disp: , Rfl:     predniSONE (DELTASONE) 20 MG tablet, Take 1 tablet by mouth 2 (Two) Times a Day for 5 days., Disp: 10 tablet, Rfl: 0    SUMAtriptan (IMITREX) 25 MG tablet, Take 1 tablet by mouth 1 (One) Time., Disp: , Rfl:     vitamin D3 125 MCG (5000 UT) capsule capsule, Take 1 capsule by mouth Daily., Disp: , Rfl:     benzonatate (Tessalon Perles) 100 MG capsule, Take 1 capsule by mouth 3 (Three) Times a Day As Needed for Cough., Disp: 12 capsule, Rfl: 0    ipratropium (Atrovent HFA) 17 MCG/ACT inhaler, Inhale 2 puffs 4 (Four) Times a Day., Disp: 12.9 g, Rfl: 2    Allergies:  Allergies   Allergen Reactions    Amitriptyline Palpitations       Family Hx:  Family History   Problem Relation Age of Onset    Diabetes Father     Migraines Father         Whole family    Heart disease Father     Diabetes Brother     Cancer Maternal Aunt     Hypertension Mother     Thyroid disease Mother     Migraines Mother         Whole family     "Anemia Mother     Cancer Paternal Grandmother     Colon cancer Neg Hx         Social History:  Social History     Socioeconomic History    Marital status: Single   Tobacco Use    Smoking status: Never     Passive exposure: Never    Smokeless tobacco: Never   Vaping Use    Vaping status: Never Used   Substance and Sexual Activity    Alcohol use: Not Currently    Drug use: Never    Sexual activity: Not Currently     Partners: Male       Review of Systems  Review of Systems   Constitutional:  Positive for activity change (decreased), chills, diaphoresis and fatigue. Negative for fever.   Respiratory:  Positive for cough (productive of mucus), chest tightness and shortness of breath (dyspnea with exertion - slowly improving). Negative for wheezing.    Gastrointestinal:  Positive for nausea. Negative for vomiting.       Objective:     /67   Pulse 67   Ht 157.5 cm (62\")   Wt 57.2 kg (126 lb)   SpO2 100%   BMI 23.05 kg/m²   Physical Exam  Constitutional:       General: She is not in acute distress.     Appearance: Normal appearance. She is not ill-appearing, toxic-appearing or diaphoretic.   Pulmonary:      Effort: Pulmonary effort is normal. No respiratory distress.      Breath sounds: Normal breath sounds. No wheezing or rales.      Comments: Coughs intermittently throughout encounter  Neurological:      Mental Status: She is alert.          Assessment/Plan:     Diagnoses and all orders for this visit:    1. Subacute cough (Primary)  2. Post-viral cough syndrome  3. COVID-19 virus infection      I treated Ms. uG for acute COVID-19 infection earlier this month on 3/7/2024.  At that time, principal symptoms included fever, chills, congestion, and cough.  See my office note from the 3/7/2024 counter for full details.  In summary, I treated her with Paxlovid as well as symptomatic treatment with Tylenol and Motrin and Mucinex DM.    Today, she tells me she did take the Paxlovid. Tolerated it well.     She was " then seen by urgent care on Sunday of last week and diagnosed with sinus infection. At that time, she was treated with Stahist, Augmentin, and prednisone for 10 days. Still taking Augmentin and prednisone.     She subsequently messaged our staff on 3/19/2024 stating that she had a persistent cough despite use of medications.  She had said that urgent care physician had requested for her to follow-up with PCP if symptoms continued.  Based on this, we had asked her to follow-up with us today.    Today, she reports continued bothersome cough. See review of systems for further details, including pertinent negatives and positives.    Vital signs are reassuring.  She is normotensive and oxygen saturation is 100% on room air.    On physical exam, she is resting comfortably and is not in any acute distress.  Lungs are clear to auscultation but she coughs throughout encounter.    I have counseled her to continue to take the medications prescribed by urgent care.  I would also obtain a chest x-ray to rule out pneumonia given history of COVID-19 viral infection with persistent cough.  If this test is negative, she most likely has a postviral cough, which is very common after viral infections and can persist for up to 6 weeks or longer in some cases.  I will prescribe Tessalon Perles and Mucinex DM to help ameliorate symptoms but as far as treating postviral cough, more recent literature does suggest that an Atrovent inhaler (inhaler normally used in the setting of COPD but also used in setting of postviral cough) could be used for this purpose.  I will prescribe this today for use for approximately 1 month.    She can follow-up as needed or if symptoms fail to improve.  Otherwise, she can follow-up at her prescheduled appointment.    -     XR Chest 2 View; Future  -     benzonatate (Tessalon Perles) 100 MG capsule; Take 1 capsule by mouth 3 (Three) Times a Day As Needed for Cough.  Dispense: 12 capsule; Refill: 0  -      ipratropium (Atrovent HFA) 17 MCG/ACT inhaler; Inhale 2 puffs 4 (Four) Times a Day.  Dispense: 12.9 g; Refill: 2  -     guaifenesin-dextromethorphan 600-30 mg (MUCINEX DM)  MG tablet sustained-release 12 hour; Take 1 tablet by mouth 2 (Two) Times a Day As Needed (cough).  Dispense: 8 tablet; Refill: 0          Rx changes: Benzonatate, Atrovent inhaler, and Mucinex DM      Follow-up:     Return if symptoms worsen or fail to improve, for Next scheduled follow up.    Preventative:  Health Maintenance   Topic Date Due    HEPATITIS C SCREENING  Never done    ANNUAL PHYSICAL  Never done    INFLUENZA VACCINE  03/22/2024 (Originally 8/1/2023)    TDAP/TD VACCINES (3 - Tdap) 09/29/2033    COVID-19 Vaccine  Completed    Pneumococcal Vaccine 0-64  Aged Out       This document has been electronically signed by Tyrone Hagan MD on March 21, 2024 10:51 EDT       Parts of this note are electronic transcriptions/translations of spoken language to printed text using the Dragon Dictation system.

## 2024-03-22 ENCOUNTER — TREATMENT (OUTPATIENT)
Dept: PHYSICAL THERAPY | Facility: CLINIC | Age: 44
End: 2024-03-22
Payer: OTHER MISCELLANEOUS

## 2024-03-22 DIAGNOSIS — R41.841 COGNITIVE COMMUNICATION DEFICIT: Primary | ICD-10-CM

## 2024-03-22 DIAGNOSIS — R26.89 BALANCE DISORDER: Primary | ICD-10-CM

## 2024-03-22 DIAGNOSIS — F07.81 POST CONCUSSIVE SYNDROME: ICD-10-CM

## 2024-03-22 NOTE — PROGRESS NOTES
Physical Therapy Daily Note  [address] [place] [address]    VISIT#: 15    Subjective   Monika Gu reports she is still feeling sick. Pt reports she is on an antibiotic now for a sinus infection. Pt reports that she continues to take her beta blocker for her heart rate and her heart rate has stayed in the 60's since being sick.       Objective     See Exercise, Manual, and Modality Logs for complete treatment.     Assessment/Plan    Pt tolerated session well with minimal to no reports of increase in symptoms. Pt did take increase in time with visual pursuit exercise today secondary to fatigue. Pt was able to scan room today to find objects with only reports of increase in discomfort due to breathing harder due to being sick. Will continue to progress patient as able.        Progress per Plan of Care and Progress strengthening /stabilization /functional activity            Timed:                 Manual Therapy:    0     mins  24070;     Therapeutic Exercise:    0     mins  37209;     Neuromuscular Go:    25    mins  63847;    Therapeutic Activity:     20     mins  65648;     Gait Trainin     mins  81003;     Ultrasound:     0     mins  08747;    Ionto                               0    mins   64495  Self pay                         0     mins [email or web address]    Un-Timed:  Electrical Stimulation:    0     mins  60768 ( )  Canalith Repos    0     mins 43035  Dry Needling     0     mins self-pay  Traction     0     mins 50682    Timed Treatment:   45   mins   Total Treatment:     45   mins    Adan Portillo PT  Physical Therapist    PT SIGNATURE: Electronically signed by Adan Portillo PT  [place] LICENSE: [id number]

## 2024-03-22 NOTE — PROGRESS NOTES
Outpatient Adult Speech Language Therapy           Treatment Note/Progress Note    Patient: Monika Gu                                                                                     Visit Date: 3/22/2024  :     1980    Referring practitioner:     Theodore Loyd MD  Date of Initial Visit:          24    Patient seen for 11 sessions    Visit Diagnoses:    ICD-10-CM ICD-9-CM   1. Cognitive communication deficit  R41.841 799.52     SUBJECTIVE     Patient is seeking out neurologist to consult a neuropsychologist to complete thorough nuerocognitive testing.   Patient states she feels more confident in her memory and attention skills but would like further testing to address higher level cognitive functioning. Clinician plans to meet with patient next week to review POC and help patient decide next steps.     OBJECTIVE   GOALS  GOALS ACTIVITY PERFORMED TRIALS COMPLETED LEVEL OF CUEING REQUIRED    LTG 1: 12 weeks: Patient will increase Functional Communication Measure Score for memory to 6/7 decreasing limitation to 1-19%.            STG 1a: 12 weeks: Patient will demonstrate the ability to use appropriate memory strategies to encode novel and complex information with min cueing.   Memory strategies  No assist needed to review strategies.   Goal met 24   STG 1b: 12 weeks:  Patient will complete moderately complex short term memory tasks with 85+% accuracy and/or min assist for strategy use.   Paragraph/ Short story    Patient was read mod complex paragraph/ short story. Patient was asked to recall information and answer comprehension questions.  Trial 1:   Trial 2:     82% overall accuracy Min assist  Goal met 3/22/2024   STG 1c:  12 weeks: Patient will complete immediate memory tasks with 85+% accuracy and min assist for strategy use.   Immediate memory Mental manipulation of numbers  Trial 1: 3/3, 3/3, 3/3, 3/3,  3/3  Trial 2: 3/3, 2/3, 3/3, 3/3, 3/3  Trial 3: 2/3, 3/3, 3/3, 2/3, 2/3   Min assist  Goal met 3/22/2024      STG 2a:  12 weeks:  Patient will generative name in a broad category 15+ items in one-minute with min cueing for strategy use.  Generative naming Naming in a category an item that begins with a certain letter:  Trial 1: 13  Trial 2: 14  Trial 3: 14   Min-mod assist   STG 2b: 12 weeks:  Patient will complete word recall activities with 85+% accuracy and min assist. Word recall Mental manipulation of numbers  Trial 1: 3/3, 3/3, 3/3, 3/3, 3/3  Trial 2: 3/3, 2/3, 3/3, 3/3, 3/3  Trial 3: 2/3, 3/3, 3/3, 2/3, 2/3   Min assist  Goal met 3/22/2024    STG 2c:  12 weeks:  Patient will complete auditory attention tasks with 85+% accuracy and min assist.   Mental manipulation    Paragraph Patient required to use auditory attention skills during paragraph reading and mental manipulation task.   Min assist  Goal met 3/22/2024   STG 2d: 12 weeks:  Patient will describe/tell a story or event with min assist.       Goal met 3/4/24.       Total Time of Visit:            12:15-1:08= 53 minutes         ASSESSMENT/PLAN     ASSESSMENT: Patient requires the skills of a therapist to provide reduced cueing to min assist to use strategies to encode novel information into memory and for strategies for word recall during IADLs.   Progress: Patient is encoding information into memory using appropriate strategies and is demonstrating better immediate recall skills.   PLAN: Continue plan of care    Recertification Due Date:04/08/24    SIGNATURE: Angelica Wan, Speech Therapy Student,   Electronically Signed on 3/22/2024  Lori Celeste MS, CCC-SLP [place] License #: [id number]          [facility]                                             [address] [place] [address]. [phone]  [phone] Office  [phone] Fax

## 2024-03-25 ENCOUNTER — TELEPHONE (OUTPATIENT)
Dept: NEUROLOGY | Facility: CLINIC | Age: 44
End: 2024-03-25

## 2024-03-25 ENCOUNTER — TELEPHONE (OUTPATIENT)
Dept: PHYSICAL THERAPY | Facility: CLINIC | Age: 44
End: 2024-03-25

## 2024-03-25 DIAGNOSIS — F07.81 POST CONCUSSIVE SYNDROME: Primary | ICD-10-CM

## 2024-03-25 NOTE — TELEPHONE ENCOUNTER
Tried calling pt to get more info- reced vm said to call back to discuss. Dr Loyd are you willing to send out for neuropsych testing?  Pt last seen 2/6

## 2024-03-25 NOTE — TELEPHONE ENCOUNTER
Caller: Monika Gu    Relationship: Self       What was the call regarding: SAID KATHERINE SAID TO DO ONCE THIS WEEK

## 2024-03-25 NOTE — TELEPHONE ENCOUNTER
PT IS CALLING BACK SHE HAS BEEN RECEIVING COGNITIVE THERP , AND SHE IS RECOMMENDING PT GET A NEURO PSY TEST TO SEE WHAT ELSE SHE MAY NEED TO WORK ON?     PLEASE CALL HER BACK     GORAN  [phone] CAN LEAVE A DETAILED MESS ON V.M IF YOU GET    PLEASE ADVISE

## 2024-03-25 NOTE — TELEPHONE ENCOUNTER
Provider: CHUYITA    Caller: GORAN    Relationship to Patient: SELF    Phone Number: [phone]    Reason for Call: PATIENT IS REQUESTING A CALL BACK TO DISCUSS A REFERRAL FOR A NEURO PSYCH EVALUATION.       PLEASE REVIEW    THANK YOU

## 2024-03-26 ENCOUNTER — TREATMENT (OUTPATIENT)
Dept: PHYSICAL THERAPY | Facility: CLINIC | Age: 44
End: 2024-03-26
Payer: OTHER MISCELLANEOUS

## 2024-03-26 DIAGNOSIS — R26.89 BALANCE DISORDER: Primary | ICD-10-CM

## 2024-03-26 DIAGNOSIS — F07.81 POST CONCUSSIVE SYNDROME: ICD-10-CM

## 2024-03-26 NOTE — PROGRESS NOTES
Physical Therapy Daily Note  [address] [place] [address]    VISIT#: 16    Subjective   Monika Gu reports she is starting to feel better. Pt reports she got an inhaler which has helped.      Objective     See Exercise, Manual, and Modality Logs for complete treatment.     Assessment/Plan    Continued to progress patient's exercises today. Advanced significantly patient's balance exercises and patient tolerated well and was able to recover with no assistance if patient has LOB. Pt also able to perform cognitive tasks today with faster speed and accuracy with outside auditory input. Overall, patient is making great progress. Will attempt performing therapy in bigger gym next PT session.     Progress per Plan of Care and Progress strengthening /stabilization /functional activity            Timed:                 Manual Therapy:    0     mins  32000;     Therapeutic Exercise:    0     mins  45317;     Neuromuscular Go:    25    mins  88441;    Therapeutic Activity:     23     mins  37859;     Gait Trainin     mins  59802;     Ultrasound:     0     mins  84011;    Ionto                               0    mins   80817  Self pay                         0     mins [email or web address]    Un-Timed:  Electrical Stimulation:    0     mins  01323 ( )  Canalith Repos    0     mins 90329  Dry Needling     0     mins self-pay  Traction     0     mins 68202    Timed Treatment:   48   mins   Total Treatment:     48   mins    Adan Portillo PT  Physical Therapist    PT SIGNATURE: Electronically signed by Adan Portillo PT  [place] LICENSE: [id number]

## 2024-03-28 ENCOUNTER — HOSPITAL ENCOUNTER (OUTPATIENT)
Dept: GENERAL RADIOLOGY | Facility: HOSPITAL | Age: 44
Discharge: HOME | End: 2024-03-28
Admitting: STUDENT IN AN ORGANIZED HEALTH CARE EDUCATION/TRAINING PROGRAM
Payer: COMMERCIAL

## 2024-03-28 ENCOUNTER — OFFICE VISIT (OUTPATIENT)
Dept: FAMILY MEDICINE CLINIC | Facility: CLINIC | Age: 44
End: 2024-03-28
Payer: COMMERCIAL

## 2024-03-28 ENCOUNTER — TREATMENT (OUTPATIENT)
Dept: PHYSICAL THERAPY | Facility: CLINIC | Age: 44
End: 2024-03-28
Payer: OTHER MISCELLANEOUS

## 2024-03-28 VITALS
SYSTOLIC BLOOD PRESSURE: 98 MMHG | OXYGEN SATURATION: 98 % | BODY MASS INDEX: 23.41 KG/M2 | DIASTOLIC BLOOD PRESSURE: 75 MMHG | HEART RATE: 72 BPM | WEIGHT: 128 LBS

## 2024-03-28 DIAGNOSIS — R26.89 BALANCE DISORDER: Primary | ICD-10-CM

## 2024-03-28 DIAGNOSIS — R06.09 DYSPNEA ON EXERTION: ICD-10-CM

## 2024-03-28 DIAGNOSIS — F07.81 POST CONCUSSIVE SYNDROME: ICD-10-CM

## 2024-03-28 DIAGNOSIS — J32.9 RECURRENT SINUSITIS: Primary | ICD-10-CM

## 2024-03-28 PROCEDURE — 70220 X-RAY EXAM OF SINUSES: CPT

## 2024-03-28 PROCEDURE — 99213 OFFICE O/P EST LOW 20 MIN: CPT | Performed by: STUDENT IN AN ORGANIZED HEALTH CARE EDUCATION/TRAINING PROGRAM

## 2024-03-28 RX ORDER — DOXYCYCLINE HYCLATE 100 MG/1
100 CAPSULE ORAL 2 TIMES DAILY
Qty: 20 CAPSULE | Refills: 0 | Status: SHIPPED | OUTPATIENT
Start: 2024-03-28 | End: 2024-04-07

## 2024-03-28 NOTE — PROGRESS NOTES
Physical Therapy Daily Note  [address] [place] [address]    VISIT#: 17    Subjective   Monika Gu reports overall she is feeling better. She reports she still feels congested and is going to the doctor today for congestion.       Objective     See Exercise, Manual, and Modality Logs for complete treatment.     Assessment/Plan    Continued to progress patient's strengthening exercises today and progressed patient's scanning activities in clinic. For the first time, patient was able to stay in clinic the entire session. Pt did become slightly symptomatic with scanning activity but able to complete and symptoms got better as she continued to perform strengthening exercises. Will continue to progress patient as able.       Progress per Plan of Care and Progress strengthening /stabilization /functional activity            Timed:                 Manual Therapy:    0     mins  03733;     Therapeutic Exercise:    38     mins  44085;     Neuromuscular Go:    0    mins  21019;    Therapeutic Activity:     15     mins  53455;     Gait Trainin     mins  73648;     Ultrasound:     0     mins  33404;    Ionto                               0    mins   01100  Self pay                         0     mins [email or web address]    Un-Timed:  Electrical Stimulation:    0     mins  06211 ( )  Canalith Repos    0     mins 24423  Dry Needling     0     mins self-pay  Traction     0     mins 98172    Timed Treatment:   53   mins   Total Treatment:     53   mins    Adan Portillo PT  Physical Therapist    PT SIGNATURE: Electronically signed by Adan Portillo PT  [place] LICENSE: [id number]

## 2024-03-28 NOTE — PROGRESS NOTES
X-ray sinuses negative.  Would hold off on CT of sinuses until she has been able to see ENT, as previously scheduled.  Can we let her know?    Thank you,    Tyrone Hagan    ?  This document has been electronically signed by Tyrone Hagan MD on March 28, 2024 16:22 EDT

## 2024-03-28 NOTE — PROGRESS NOTES
Subjective:       Monika Gu is a 43 y.o. female who presents for recurrent sinusitis.    I treated Monika earlier this month with Paxlovid for a COVID-19 infection on 3/7/2024.  Subsequent to this, she was seen by urgent care on 3/17/2024 and started on Augmentin, Stahist, and prednisone for sinusitis.  At our follow-up visit on 3/21/2024, she was having a bothersome postviral cough and I have started Atrovent for this.  At that time, she was still taking the antibiotic course prescribed to her by urgent care.  Today, she says that despite initial improvement, she is stopped antibiotics and now has worsening symptoms again.  She continues to have significant frontal and maxillary sinus tenderness to palpation.     I believe she is having some benefit with the Atrovent inhaler, but she does have frequent intermittent cough throughout our exam.  Because she has had some dyspnea on exertion following COVID-19 and a persistent cough, even though she does not have a history of asthma, I would obtain pulmonary function testing on her.      I have also been following Monika for postconcussive syndrome, as extensively documented in prior notes.  She says that she is continuing to work with physical therapy, speech therapy, cognitive therapy and has improved to the point where she is thinking about potentially going back to work.  She has a referral to neuropsychiatry in place to help with this determination.  She seems to be making progress after the initial setback from COVID-19.  Will follow-up as needed for now as she is currently managed by multiple specialists.    I had also been evaluating Monika for palpitations.  She has a Holter monitor that was essentially normal study.  She has an upcoming echocardiogram scheduled by cardiology and they did transition her from propranolol to metoprolol.  She reports that she is having much better symptoms on metoprolol.  Would again follow as needed from this  standpoint as she has upcoming follow-up for echocardiogram and cardiology.  She did clarify with me that she believes after discussion with neurology that she is experienced in adrenergic response resulted to the postconcussive syndrome, which was why beta-blocker was prescribed.    The following portions of the patient's history were reviewed and updated as appropriate: allergies, current medications, past family history, past medical history, past social history, past surgical history, and problem list.    Past Medical Hx:  Past Medical History:   Diagnosis Date    Abnormal ECG 2/2/2024    Sinus tachycardia    Allergic     Allergic rhinitis 2002    Seasonal allergy symptoms all seasons    Arrhythmia     Arthritis     Cancer 3/19    Adenocarcinoma in situ    Concussion 10/2023    L FACIAL INJURY foot    Difficulty walking 10/25    Dizziness October 26, 2023    Concussion    Endometriosis     Fracture of nasal bones 1996    Left side of bridge of nose    GERD (gastroesophageal reflux disease)     Headache     Headache, tension-type 10/25    History of medical problems     Hyperthyroidism     Memory loss 10/25    Migraine 10/25    Nosebleed 2002    Dry weather and seasonal changes    Shingles     Syncope 10/25    Tinnitus October 26, 2023    Concussion       Past Surgical Hx:  Past Surgical History:   Procedure Laterality Date    APPENDECTOMY      HYSTERECTOMY      OOPHORECTOMY  2019    ADENOCARCINOMA    TONSILLECTOMY         Current Meds:    Current Outpatient Medications:     Azelastine HCl 137 MCG/SPRAY solution, by Each Nare route 2 (Two) Times a Day As Needed. USE 2 SPRAYS PER NOSTRIL TWICE A DAY AS NEEDED, Disp: , Rfl:     dexlansoprazole (DEXILANT) 60 MG capsule, Take 1 capsule by mouth Daily., Disp: 90 capsule, Rfl: 1    dicyclomine (BENTYL) 10 MG capsule, Take 1 capsule by mouth 4 (Four) Times a Day Before Meals & at Bedtime. (Patient taking differently: Take 1 capsule by mouth 4 (Four) Times a Day Before  Meals & at Bedtime. prn), Disp: 16 capsule, Rfl: 0    estradiol (ESTRACE) 0.1 MG/GM vaginal cream, Insert 2 g into the vagina Daily., Disp: , Rfl:     ipratropium (Atrovent HFA) 17 MCG/ACT inhaler, Inhale 2 puffs 4 (Four) Times a Day., Disp: 12.9 g, Rfl: 2    levothyroxine (SYNTHROID, LEVOTHROID) 50 MCG tablet, Patient taking this dose 2 times a week in the 75 mcg once a week historically, Disp: 24 tablet, Rfl: 1    levothyroxine (SYNTHROID, LEVOTHROID) 75 MCG tablet, TAKE 1 TABLET BY MOUTH ONE DAY A WEEK IN THE MORNING, Disp: 20 tablet, Rfl: 1    metoprolol succinate XL (TOPROL-XL) 25 MG 24 hr tablet, Take 1 tablet by mouth Daily., Disp: 90 tablet, Rfl: 3    SUMAtriptan (IMITREX) 25 MG tablet, Take 1 tablet by mouth 1 (One) Time., Disp: , Rfl:     vitamin D3 125 MCG (5000 UT) capsule capsule, Take 1 capsule by mouth Daily., Disp: , Rfl:     amoxicillin-clavulanate (AUGMENTIN) 875-125 MG per tablet, Take 1 tablet by mouth Every 12 (Twelve) Hours. (Patient not taking: Reported on 3/28/2024), Disp: 14 tablet, Rfl: 0    benzonatate (Tessalon Perles) 100 MG capsule, Take 1 capsule by mouth 3 (Three) Times a Day As Needed for Cough. (Patient not taking: Reported on 3/28/2024), Disp: 12 capsule, Rfl: 0    doxycycline (VIBRAMYCIN) 100 MG capsule, Take 1 capsule by mouth 2 (Two) Times a Day for 10 days., Disp: 20 capsule, Rfl: 0    guaifenesin-dextromethorphan 600-30 mg (MUCINEX DM)  MG tablet sustained-release 12 hour, Take 1 tablet by mouth 2 (Two) Times a Day As Needed (cough). (Patient not taking: Reported on 3/28/2024), Disp: 8 tablet, Rfl: 0    Allergies:  Allergies   Allergen Reactions    Amitriptyline Palpitations       Family Hx:  Family History   Problem Relation Age of Onset    Diabetes Father     Migraines Father         Whole family    Heart disease Father     Diabetes Brother     Cancer Maternal Aunt     Hypertension Mother     Thyroid disease Mother     Migraines Mother         Whole family    Anemia  Mother     Cancer Paternal Grandmother     Colon cancer Neg Hx         Social History:  Social History     Socioeconomic History    Marital status: Single   Tobacco Use    Smoking status: Never     Passive exposure: Never    Smokeless tobacco: Never   Vaping Use    Vaping status: Never Used   Substance and Sexual Activity    Alcohol use: Not Currently    Drug use: Never    Sexual activity: Not Currently     Partners: Male       Review of Systems  Review of Systems   HENT:  Positive for congestion, sinus pressure and sinus pain.    Respiratory:  Positive for cough.        Objective:     BP 98/75 (BP Location: Right arm, Patient Position: Sitting, Cuff Size: Adult)   Pulse 72   Wt 58.1 kg (128 lb)   SpO2 98%   BMI 23.41 kg/m²   Physical Exam  Constitutional:       General: She is not in acute distress.     Appearance: Normal appearance. She is not ill-appearing, toxic-appearing or diaphoretic.   Pulmonary:      Effort: Pulmonary effort is normal. No respiratory distress.      Comments: Intermittent cough throughout exam  Neurological:      Mental Status: She is alert.   Psychiatric:         Mood and Affect: Mood normal.         Behavior: Behavior normal.          Assessment/Plan:     Diagnoses and all orders for this visit:    1. Recurrent sinusitis (Primary)    Today, she says that despite initial improvement, she is stopped antibiotics and now has worsening symptoms again.  She continues to have significant frontal and maxillary sinus tenderness to palpation.      I have reviewed the guidelines regarding recurrent sinusitis.  As she is already been on Augmentin, this would be a respiratory fluoroquinolone or doxycycline 100 mg twice daily for 7 to 10 days.  Because she is currently having workup by cardiology for palpitations, I would be hesitant to start a respiratory fluoroquinolone on her.  Discussed benefits and risk of doxycycline.  I told her to take this with meals as it can be hard on the stomach.  I  told her not to use her multivitamin or minerals while on this medication because they can decrease absorption.  I told her to use sunscreen if going outside for prolonged intervals as it can make her photosensitive.  I will also obtain an x-ray of the sinuses today.  She has an upcoming appointment with ENT and because of the bothersome nature of her symptoms, I did ask her to address this with them if she has not improved by that time.          -     XR Sinuses 3+ View (In Office)  -     doxycycline (VIBRAMYCIN) 100 MG capsule; Take 1 capsule by mouth 2 (Two) Times a Day for 10 days.  Dispense: 20 capsule; Refill: 0    2. Dyspnea on exertion    I believe she is having some benefit with the Atrovent inhaler, but she does have frequent intermittent cough throughout our exam.  Because she has had some dyspnea on exertion following COVID-19 and a persistent cough, even though she does not have a history of asthma, I would obtain pulmonary function testing on her.      -     Complete PFT - Pre & Post Bronchodilator; Future          Rx changes: Doxycycline for recurrent sinusitis    Follow-up:     Return in about 3 months (around 6/28/2024) for Recheck.    Preventative:  Health Maintenance   Topic Date Due    HEPATITIS C SCREENING  Never done    ANNUAL PHYSICAL  Never done    INFLUENZA VACCINE  03/29/2024 (Originally 8/1/2023)    TDAP/TD VACCINES (3 - Tdap) 09/29/2033    COVID-19 Vaccine  Completed    Pneumococcal Vaccine 0-64  Aged Out           This document has been electronically signed by Tyrone Hagan MD on March 28, 2024 10:24 EDT       Parts of this note are electronic transcriptions/translations of spoken language to printed text using the Dragon Dictation system.

## 2024-03-29 ENCOUNTER — TREATMENT (OUTPATIENT)
Dept: PHYSICAL THERAPY | Facility: CLINIC | Age: 44
End: 2024-03-29
Payer: OTHER MISCELLANEOUS

## 2024-03-29 DIAGNOSIS — R41.841 COGNITIVE COMMUNICATION DEFICIT: Primary | ICD-10-CM

## 2024-03-29 NOTE — PROGRESS NOTES
Outpatient Adult Speech Language Therapy           Treatment Note/ Discharge     Patient: Monika Gu                                                                                     Visit Date: 3/29/2024  :     1980    Referring practitioner:     Theodore Loyd MD  Date of Initial Visit:          24    Patient seen for 12 sessions    Visit Diagnoses:    ICD-10-CM ICD-9-CM   1. Cognitive communication deficit  R41.841 799.52     SUBJECTIVE   Patient set up neurocognitive test and plans to complete within the next few weeks.     Education: Recommendations, discharge, POC  OBJECTIVE   GOALS  GOALS ACTIVITY PERFORMED TRIALS COMPLETED LEVEL OF CUEING REQUIRED    LTG 1: 12 weeks: Patient will increase Functional Communication Measure Score for memory to 6/7 decreasing limitation to 1-19%.         Goal met 2024   STG 1a: 12 weeks: Patient will demonstrate the ability to use appropriate memory strategies to encode novel and complex information with min cueing.     Goal met 24   STG 1b: 12 weeks:  Patient will complete moderately complex short term memory tasks with 85+% accuracy and/or min assist for strategy use.     Goal met 3/22/2024   STG 1c:  12 weeks: Patient will complete immediate memory tasks with 85+% accuracy and min assist for strategy use.     Goal met 3/22/2024      STG 2a:  12 weeks:  Patient will generative name in a broad category 15+ items in one-minute with min cueing for strategy use.  Generative naming Naming in a category an item that begins with a certain letter:  Trial 1: 11  Trial 2: 7  Trial 3: 18    Naming Restaurants: 12  Naming Animals: 15  Naming Actors: 16   Goal met 2024  Min assist   STG 2b: 12 weeks:  Patient will complete word recall activities with 85+% accuracy and min assist.   Goal met 3/22/2024    STG 2c:  12 weeks:  Patient will complete auditory attention tasks with 85+%  accuracy and min assist.     Goal met 3/22/2024   [facility] 2d: 12 weeks:  Patient will describe/tell a story or event with min assist.     Goal met 3/4/24      Total Time of Visit:            12:20-1:00= 40 minutes         ASSESSMENT/PLAN     ASSESSMENT: Patient is able to implement memory strategies with min assist to safely complete IADLs.  PLAN: Discharge    Recertification Due Date:04/08/24    SPEECH PATHOLOGY DISCHARGE SUMMARY     DIAGNOSIS: Cognitive Communication Deficit     SUBJECTIVE: Patient here today to increase memory, language and executive functioning for safe completion of IADLs.     OBJECTIVE DATA/ CARE AND TREATMENT RECEIVED: Cognitive therapy     PATIENT STATUS AT DISCHARGE: Patient has met all goals and at highest levels of functioning.     INSTRUCTIONS GIVEN TO PATIENT AND FAMILY: Patient was instructed to complete neurocognitive testing.     FUNCTIONAL ASSESSMENT INSTRUMENT: Patient currently scored a level 6 of 7 on Functional Communication Measures for swallowing indicating a 1-19% limitation in function on a projected goal of 1-19% limitation.     ASSESSMENT: See above     REASON FOR DISCHARGE: Patient is able to effectively implement compensatory strategies during IADLs.      DISCHARGE PLAN/ RECOMMENDATIONS: Patient was instructed to continue using compensatory strategies for safe completion of IADLs. Clinician stated that if patient has any concerns regarding results of neurocognitive test to reach out for consult.      DISCHARGE DATE: 03/29/2024     TOTAL VISITS: 12    SIGNATURE: Angelica Wan, Speech Therapy Student,   Electronically Signed on 3/29/2024  Lori Celeste MS, CCC-SLP [place] License #: [id number]          [facility]                                             [address] [place] [address]. [address]  [phone] Office  [phone] Fax

## 2024-04-02 ENCOUNTER — TREATMENT (OUTPATIENT)
Dept: PHYSICAL THERAPY | Facility: CLINIC | Age: 44
End: 2024-04-02
Payer: OTHER MISCELLANEOUS

## 2024-04-02 DIAGNOSIS — R26.89 BALANCE DISORDER: Primary | ICD-10-CM

## 2024-04-02 DIAGNOSIS — F07.81 POST CONCUSSIVE SYNDROME: ICD-10-CM

## 2024-04-02 NOTE — PROGRESS NOTES
Physical Therapy Daily Note  [address] [place] [address]    VISIT#: 18    Subjective   Monika Gu reports she got another antibiotic and she is starting to feel a lot better.       Objective     See Exercise, Manual, and Modality Logs for complete treatment.     Assessment/Plan    Continued to progress patient's strengthening and balance exercises and patient tolerated well. Pt did have increase in symptoms with exercise that visual field changed directions but otherwise did not have any symptoms today of increase in heart rate and increase in being hot. Will continue to progress patient as able.     Progress per Plan of Care and Progress strengthening /stabilization /functional activity            Timed:                 Manual Therapy:    0     mins  88480;     Therapeutic Exercise:    30     mins  24107;     Neuromuscular Go:    25    mins  99302;    Therapeutic Activity:     0     mins  16406;     Gait Trainin     mins  33519;     Ultrasound:     0     mins  32499;    Ionto                               0    mins   43824  Self pay                         0     mins [email or web address]    Un-Timed:  Electrical Stimulation:    0     mins  33688 ( )  Canalith Repos    0     mins 12993  Dry Needling     0     mins self-pay  Traction     0     mins 76257    Timed Treatment:   55   mins   Total Treatment:     55   mins    Adan Portillo PT  Physical Therapist    PT SIGNATURE: Electronically signed by Adan Portillo PT  [place] LICENSE: [id number]

## 2024-04-04 ENCOUNTER — TREATMENT (OUTPATIENT)
Dept: PHYSICAL THERAPY | Facility: CLINIC | Age: 44
End: 2024-04-04
Payer: OTHER MISCELLANEOUS

## 2024-04-04 DIAGNOSIS — F07.81 POST CONCUSSIVE SYNDROME: ICD-10-CM

## 2024-04-04 DIAGNOSIS — R26.89 BALANCE DISORDER: Primary | ICD-10-CM

## 2024-04-04 NOTE — PROGRESS NOTES
Physical Therapy Daily Note  [address] [place] [address]    VISIT#: 19    Subjective   Monika Gu reports she feels good today. Pt reports she did have a bad day yesterday.       Objective     See Exercise, Manual, and Modality Logs for complete treatment.     Assessment/Plan    Continued to progress patient's exercises and patient tolerated well. Pt was able to search clinic for more cards with sound and several people moving around clinic with no increase in signs and symptoms. Also, performed higher level cardio exercises and patient tolerated well with no increase in symptoms. Pt continues to make great progress and will continue to progress patient as able.     Progress per Plan of Care and Progress strengthening /stabilization /functional activity            Timed:                 Manual Therapy:    0     mins  61778;     Therapeutic Exercise:    23     mins  69192;     Neuromuscular Go:    5    mins  79520;    Therapeutic Activity:     23     mins  95998;     Gait Trainin     mins  86286;     Ultrasound:     0     mins  21377;    Ionto                               0    mins   92334  Self pay                         0     mins [email or web address]    Un-Timed:  Electrical Stimulation:    0     mins  68764 ( )  Canalith Repos    0     mins 28520  Dry Needling     0     mins self-pay  Traction     0     mins 31141    Timed Treatment:   51   mins   Total Treatment:     51   mins    Adan Portillo PT  Physical Therapist    PT SIGNATURE: Electronically signed by Adan Portillo PT  [place] LICENSE: [id number]

## 2024-04-05 ENCOUNTER — TELEPHONE (OUTPATIENT)
Dept: GASTROENTEROLOGY | Facility: CLINIC | Age: 44
End: 2024-04-05
Payer: COMMERCIAL

## 2024-04-05 NOTE — TELEPHONE ENCOUNTER
Attempted to contact patient to reschedule appointment on 04.12.24 to 04.16.24 due to provider being out of office. [place].

## 2024-04-09 ENCOUNTER — TREATMENT (OUTPATIENT)
Dept: PHYSICAL THERAPY | Facility: CLINIC | Age: 44
End: 2024-04-09
Payer: OTHER MISCELLANEOUS

## 2024-04-09 DIAGNOSIS — F07.81 POST CONCUSSIVE SYNDROME: ICD-10-CM

## 2024-04-09 DIAGNOSIS — R26.89 BALANCE DISORDER: Primary | ICD-10-CM

## 2024-04-09 NOTE — PROGRESS NOTES
Physical Therapy Daily Note  [address] [place] [address]    VISIT#: 20    Subjective   Monika Gu reports she is having a bad day today. Pt reports she did a lot yesterday which could be why she is having a harder day today.       Objective     See Exercise, Manual, and Modality Logs for complete treatment.     Assessment/Plan    Continued to progress patient's exercises today and patient tolerated. Pt did have increase in difficulty with identifying letters that were more in peripheral vision and/or letters that were further apart. Pt did take increase in time with identifying objects further apart. Will continue to progress patient as able.     Progress per Plan of Care and Progress strengthening /stabilization /functional activity            Timed:                 Manual Therapy:    0     mins  09756;     Therapeutic Exercise:    25     mins  74944;     Neuromuscular Go:    25    mins  36167;    Therapeutic Activity:     0     mins  93196;     Gait Trainin     mins  30252;     Ultrasound:     0     mins  44900;    Ionto                               0    mins   80217  Self pay                         0     mins [email or web address]    Un-Timed:  Electrical Stimulation:    0     mins  33265 ( )  Canalith Repos    0     mins 79771  Dry Needling     0     mins self-pay  Traction     0     mins 82728    Timed Treatment:   50   mins   Total Treatment:     50   mins    Adan Portillo PT  Physical Therapist    PT SIGNATURE: Electronically signed by Adan Portillo PT  [place] LICENSE: [id number]

## 2024-04-10 ENCOUNTER — TELEPHONE (OUTPATIENT)
Dept: GASTROENTEROLOGY | Facility: CLINIC | Age: 44
End: 2024-04-10
Payer: COMMERCIAL

## 2024-04-10 NOTE — TELEPHONE ENCOUNTER
Attempted to contact patient to reschedule appointment on 04.12.24 due to provider being out of office. [facility].

## 2024-04-10 NOTE — TELEPHONE ENCOUNTER
Attempted to send a Artera message to patient to reschedule appointment on [phone] due to provider being out of office.

## 2024-04-11 ENCOUNTER — TREATMENT (OUTPATIENT)
Dept: PHYSICAL THERAPY | Facility: CLINIC | Age: 44
End: 2024-04-11
Payer: OTHER MISCELLANEOUS

## 2024-04-11 DIAGNOSIS — F07.81 POST CONCUSSIVE SYNDROME: ICD-10-CM

## 2024-04-11 DIAGNOSIS — R26.89 BALANCE DISORDER: Primary | ICD-10-CM

## 2024-04-11 NOTE — PROGRESS NOTES
Re-Assessment / Re-Certification  [address] [place] [id number]      Patient: Monika Gu   : 1980  Diagnosis/ICD-10 Code:  Balance disorder [R26.89]  Referring practitioner: Theodore Loyd MD  Date of Initial Visit: Type: THERAPY  Noted: 2024  Today's Date: 2024  Patient seen for 21 sessions      Subjective:     Subjective Questionnaire: Optimal:   Clinical Progress: improved  Home Program Compliance: Yes  Treatment has included: therapeutic exercise, neuromuscular re-education, manual therapy, therapeutic activity, and gait training    Subjective Pt reports after last therapy session she had a significant migraine. Pt reports she has never had a migraine like this before. Pt reports she was having spots in her vision and was in significant pain. Pt reports she is better today, but does still have lingering migraine effects.     Objective     Functional Assessment      Comments  Tandem with R in front: 30 seconds - increase in lateral sway  Tandem with L in front: 30 seconds - increase in lateral sway  SLS on L LE: 30 seconds increase in ankle and hip strategy   SLS on R LE: 30 seconds  Tandem L in front with EC: 16 seconds   Tandem R front with EC: 30 seconds     FGA:  - Pt had to take a break with testing after gait and pivot turn task due to being symptomatic.     Assessment/Plan    Pt has made excellent progress with improving balance and functional mobility with less symptoms since last PN. Was able to test with patient's eyes closed and patient did have a decrease in balance and significant increase in ankle and hip strategy. Able to perform FGA for the first time today and patient did score below minimal score that is acceptable for age. Pt scored a 21/30 and the minimal score is 24 and the median is 29. Pt requires skilled PT in order to address deficits listed to return patient back to maximum function to include work.       Goals  Plan Goals: 1. Pt has  difficulty ambulating in environment.     1. LTG 1: 12 weeks: Pt will be able to ambulate in store for 30 minutes with minimal to no signs and symptoms of nausea.     STATUS: IN PROGRESS     STG 1: 6 weeks: Pt will be able to ambulate throughout clinic with minimal to no signs and symptoms of nausea for a total of 15 minutes.      STATUS:  MET     2. The patient has gait dysfunction.     LTG 2: 12 weeks:  The patient will demonstrate > 29 / 30 on the Functional Gait Assessment for improved functional mobility.      STATUS:  IN PROGRESS     STG 2: The patient will demonstrate > 25 / 30 on the Functional Gait Assessment for improved functional mobility.      STATUS:  IN PROGRESS     3. The patient has difficulty with balance.      LTG 3: 12 weeks: The patient will hold the sharpened romberg position with eyes closed for 20 seconds and SLS with EC for 10 seconds in order to improve balance and decrease risk of falling.                             STATUS: IN PROGRESS     STG 3: 6 weeks: The patient will hold the romberg position with eyes opened for 10 seconds in order to improve balance and decrease risk of falling.                             STATUS: MET     LTG 4: Pt will be able to perform all directions of visual tracking test with minimal to no signs and symptoms of nausea in order to allow for patient to scan hallways at work.      STATUS: IN PROGRESS    Progress toward previous goals: Partially Met    See Exercise, Manual, and Modality Logs for complete treatment.         Recommendations: Continue as planned  Timeframe:2x a week for  2 months  Prognosis to achieve goals: good    PT Signature: Electronically signed by Adan Portillo PT  [place] LICENSE: [id number]      Based upon review of the patient's progress and continued therapy plan, it is my medical opinion that Monika Gu should continue physical therapy treatment at [facility] PHYSICAL THERAPY  [address]  [address]  [phone].    Signature: __________________________________  Theodore Loyd MD    Timed:           Timed:         Manual Therapy:    0     mins  00113;     Therapeutic Exercise:    0     mins  39843;     Neuromuscular oG:    23    mins  36116;    Therapeutic Activity:     23     mins  56510;     Gait Trainin     mins  79734;     Ultrasound:     0     mins  86209;    Ionto                               0    mins   88661  Self pay                         0     mins [email or web address]    Un-Timed:  Electrical Stimulation:    0     mins  59767 ( )  Canalith Repos    0     mins 96051  Dry Needling     0     mins self-pay  Traction     0     mins 33725  Re-Eval                           0    mins  32495    Timed Treatment:   46   mins   Total Treatment:     46   mins    PT SIGNATURE: Electronically signed by Adan Portillo PT  [place] LICENSE: [id number]     DATE TREATMENT INITIATED: 2024    90 Day Recertification  Certification Period: 2024 thru 2024  I certify that the therapy services are furnished while this patient is under my care.  The services outlined above are required by this patient, and will be reviewed every 90 days.     PHYSICIAN: Theodore Loyd MD   NPI: [id number]      DATE:     Please sign and return via fax to [phone] Thank you, [facility].

## 2024-04-15 ENCOUNTER — TREATMENT (OUTPATIENT)
Dept: PHYSICAL THERAPY | Facility: CLINIC | Age: 44
End: 2024-04-15
Payer: OTHER MISCELLANEOUS

## 2024-04-15 DIAGNOSIS — R26.89 BALANCE DISORDER: Primary | ICD-10-CM

## 2024-04-15 DIAGNOSIS — F07.81 POST CONCUSSIVE SYNDROME: ICD-10-CM

## 2024-04-15 DIAGNOSIS — R41.841 COGNITIVE COMMUNICATION DEFICIT: ICD-10-CM

## 2024-04-15 PROCEDURE — 97530 THERAPEUTIC ACTIVITIES: CPT | Performed by: PHYSICAL THERAPIST

## 2024-04-15 PROCEDURE — 97112 NEUROMUSCULAR REEDUCATION: CPT | Performed by: PHYSICAL THERAPIST

## 2024-04-15 NOTE — PROGRESS NOTES
Physical Therapy Daily Note  [address] [place] [address]    VISIT#: 22    Subjective   Monika Gu reports she was able to go hiking this weekend. Pt reports she had to go slow and take some breaks, but she was able to complete. Pt reports she is feeling slightly off today.       Objective     See Exercise, Manual, and Modality Logs for complete treatment.     Assessment/Plan    Continued to progress patient's strengthening exercises and patient tolerated well. Pt became hot throughout exercises, but did not become symptomatic. Able to complete entire treatment session in larger clinic. Able to perform balance and cognitive task together today for the first time. Will continue to progress patient as able.     Progress per Plan of Care and Progress strengthening /stabilization /functional activity            Timed:                 Manual Therapy:    0     mins  59303;     Therapeutic Exercise:    0     mins  73324;     Neuromuscular Go:    23    mins  97246;    Therapeutic Activity:     23     mins  57341;     Gait Trainin     mins  94628;     Ultrasound:     0     mins  72215;    Ionto                               0    mins   39121  Self pay                         0     mins [email or web address]    Un-Timed:  Electrical Stimulation:    0     mins  84245 ( )  Canalith Repos    0     mins 76517  Dry Needling     0     mins self-pay  Traction     0     mins 24879    Timed Treatment:   46   mins   Total Treatment:     46   mins    Adan Portillo PT  Physical Therapist    PT SIGNATURE: Electronically signed by Adan Portillo PT  [place] LICENSE: [id number]

## 2024-04-18 ENCOUNTER — TREATMENT (OUTPATIENT)
Dept: PHYSICAL THERAPY | Facility: CLINIC | Age: 44
End: 2024-04-18
Payer: OTHER MISCELLANEOUS

## 2024-04-18 DIAGNOSIS — F07.81 POST CONCUSSIVE SYNDROME: ICD-10-CM

## 2024-04-18 DIAGNOSIS — R26.89 BALANCE DISORDER: Primary | ICD-10-CM

## 2024-04-18 NOTE — PROGRESS NOTES
Physical Therapy Daily Note  [address] [place] [address]    VISIT#: 23    Subjective   Monika Gu reports she continues to have a difficult time sleeping. Pt reports she felt well after last therapy session.      Objective     See Exercise, Manual, and Modality Logs for complete treatment.     Assessment/Plan    Continued to progress patient's balance and strengthening exercises and patient tolerated well. Pt was able to perform entire session in [facility] again today. Performed agility ladder drills to incorporate higher level coordination exercise, in addition to adding a cognitive component with different patterns. Pt was challenged with agility ladders due to significant decrease in coordination. Pt required verbal cues for pattern with some sequences. Pt did have decrease in speed with agility ladder drills. Overall, patient continues to make great progress and will continue to progress patient as able.       Progress per Plan of Care and Progress strengthening /stabilization /functional activity            Timed:                 Manual Therapy:    0     mins  19483;     Therapeutic Exercise:    0     mins  34036;     Neuromuscular Go:    25    mins  82098;    Therapeutic Activity:     25     mins  82972;     Gait Trainin     mins  51470;     Ultrasound:     0     mins  67261;    Ionto                               0    mins   23400  Self pay                         0     mins [email or web address]    Un-Timed:  Electrical Stimulation:    0     mins  00605 ( )  Canalith Repos    0     mins 12726  Dry Needling     0     mins self-pay  Traction     0     mins 67601    Timed Treatment:   50   mins   Total Treatment:     50   mins    Adan Portillo PT  Physical Therapist    PT SIGNATURE: Electronically signed by Adan Portillo PT  [place] LICENSE: [id number]

## 2024-04-19 ENCOUNTER — TELEPHONE (OUTPATIENT)
Dept: NEUROLOGY | Facility: CLINIC | Age: 44
End: 2024-04-19

## 2024-04-19 DIAGNOSIS — E03.9 HYPOTHYROIDISM, UNSPECIFIED TYPE: Primary | ICD-10-CM

## 2024-04-19 RX ORDER — LEVOTHYROXINE SODIUM 0.05 MG/1
TABLET ORAL
Qty: 24 TABLET | Refills: 6 | Status: SHIPPED | OUTPATIENT
Start: 2024-04-19

## 2024-04-19 RX ORDER — LEVOTHYROXINE SODIUM 0.07 MG/1
75 TABLET ORAL TAKE AS DIRECTED
Qty: 8 TABLET | Refills: 6 | Status: SHIPPED | OUTPATIENT
Start: 2024-04-19

## 2024-04-19 NOTE — TELEPHONE ENCOUNTER
"  Caller: Monika Gu    Best call back number:   Telephone Information:   Mobile [phone]       Who are you requesting to speak with (clinical staff, provider,  specific staff member): REFERRAL COORDINATOR     What was the call regarding: PT STATES SHE REACHED OUT TO [facility][note 3] TO MAKE AN APPOINTMENT FOR NEUROPSYCHOLOGY AND WAS NOTIFIED THEY ARE BOOKED UNTIL 11-6-24      SHE DID NOT MAKE AN APPOINTMENT BECAUSE THEY ALSO NEED THE WORKMAN'S COMP INFORMATION. THEY ADVISED \"INFORMATION CAN ONLY BE SENT FROM REFERRING PROVIDER. \" AND THAT AN APPOINTMENT CAN NOT BE SCHEDULED UNTIL AUTHORIZED     PLEASE REVIEW AND ADVISE.       "

## 2024-04-23 ENCOUNTER — TREATMENT (OUTPATIENT)
Dept: PHYSICAL THERAPY | Facility: CLINIC | Age: 44
End: 2024-04-23
Payer: OTHER MISCELLANEOUS

## 2024-04-23 DIAGNOSIS — R26.89 BALANCE DISORDER: Primary | ICD-10-CM

## 2024-04-23 DIAGNOSIS — F07.81 POST CONCUSSIVE SYNDROME: ICD-10-CM

## 2024-04-23 PROCEDURE — 97112 NEUROMUSCULAR REEDUCATION: CPT | Performed by: PHYSICAL THERAPIST

## 2024-04-23 PROCEDURE — 97530 THERAPEUTIC ACTIVITIES: CPT | Performed by: PHYSICAL THERAPIST

## 2024-04-24 NOTE — PROGRESS NOTES
Physical Therapy Daily Note  [address] [place] [address]    VISIT#: 24    Subjective   Monika Gu reports her mom has been in town to visit and reports she has done well with her visiting, but reports she slept/rested all day after her mom left due to feeling fatigued.       Objective     See Exercise, Manual, and Modality Logs for complete treatment.     Assessment/Plan    Continued to perform highly level activities today and patient did better with sequencing and speed with agility ladder drill with the sequences that were performed at last session but had increase in difficulty with new sequences and had to decrease her speed. Pt did have increase in neuralgia in back of head with jumping activity with agility ladder. Pt did report less neuralgia with jogging today compared to last PT session. Will continue to progress patient as able.     Progress per Plan of Care and Progress strengthening /stabilization /functional activity            Timed:                 Manual Therapy:    0     mins  25540;     Therapeutic Exercise:    0     mins  66418;     Neuromuscular Go:    38    mins  50443;    Therapeutic Activity:     12     mins  47968;     Gait Trainin     mins  78960;     Ultrasound:     0     mins  97960;    Ionto                               0    mins   22453  Self pay                         0     mins [email or web address]    Un-Timed:  Electrical Stimulation:    0     mins  14344 ( )  Canalith Repos    0     mins 38828  Dry Needling     0     mins self-pay  Traction     0     mins 75411    Timed Treatment:   50   mins   Total Treatment:     50   mins    Adan Portillo PT  Physical Therapist    PT SIGNATURE: Electronically signed by Adan Portillo PT  [place] LICENSE: [id number]

## 2024-04-26 ENCOUNTER — TREATMENT (OUTPATIENT)
Dept: PHYSICAL THERAPY | Facility: CLINIC | Age: 44
End: 2024-04-26
Payer: OTHER MISCELLANEOUS

## 2024-04-26 DIAGNOSIS — R26.89 BALANCE DISORDER: Primary | ICD-10-CM

## 2024-04-26 DIAGNOSIS — F07.81 POST CONCUSSIVE SYNDROME: ICD-10-CM

## 2024-04-26 NOTE — TELEPHONE ENCOUNTER
Caller: Monika Gu    Relationship: Self    Best call back number: [phone] (home)     What is the best time to reach you: ANYTIME    What was the call regarding: [facility] AND [facility] STILL HAVE NOT RECEIVED THE WORK COMP PPW AND THE PATIENT WANTS TO SPEAK TO WHOEVER WOULD BE SENDING THAT INFO. THEY WILL NOT SCHEDULE AS A PRIORITY WITHOUT THE PPW.     TRIED TO WARM TRANSFER BUT WAS UNSUCCESSFUL.     PLEASE REVIEW  THANK YOU

## 2024-04-26 NOTE — PROGRESS NOTES
Physical Therapy Daily Note  [address] [place] [address]    VISIT#: 25    Subjective   Monika Gu reports this morning she is having a slightly harder time. Pt's appointment is earlier then normal and patient reports she typically has a harder time in the mornings due to her difficulty sleeping and taking longer for her brain to wake up.       Objective     See Exercise, Manual, and Modality Logs for complete treatment.     Assessment/Plan    Pt was more challenged today with balance on treadmill and with walking in clinic. Pt also had increase in difficulty with recalling letters and numbers and visual tracking today compared to previous sessions. Pt does report having increase in difficulty in the mornings and was evident in today's session. Pt also had decrease in time with timed activities compared to previous sessions. Will continue to progress patient as able.       Progress per Plan of Care and Progress strengthening /stabilization /functional activity            Timed:                 Manual Therapy:    0     mins  57103;     Therapeutic Exercise:    0     mins  18268;     Neuromuscular Go:    40    mins  35771;    Therapeutic Activity:     8     mins  41397;     Gait Trainin     mins  35256;     Ultrasound:     0     mins  56872;    Ionto                               0    mins   64099  Self pay                         0     mins [email or web address]    Un-Timed:  Electrical Stimulation:    0     mins  94702 ( )  Canalith Repos    0     mins 00255  Dry Needling     0     mins self-pay  Traction     0     mins 41906    Timed Treatment:   48   mins   Total Treatment:     48   mins    Adan Portillo PT  Physical Therapist    PT SIGNATURE: Electronically signed by Adan Portillo PT  [place] LICENSE: [id number]

## 2024-05-01 ENCOUNTER — HOSPITAL ENCOUNTER (OUTPATIENT)
Dept: CARDIOLOGY | Facility: HOSPITAL | Age: 44
Discharge: HOME | End: 2024-05-01
Admitting: SPECIALIST
Payer: COMMERCIAL

## 2024-05-01 ENCOUNTER — TELEPHONE (OUTPATIENT)
Dept: NEUROLOGY | Facility: CLINIC | Age: 44
End: 2024-05-01

## 2024-05-01 DIAGNOSIS — R00.2 PALPITATIONS: ICD-10-CM

## 2024-05-01 LAB
BH CV ECHO MEAS - AO ROOT DIAM: 2.8 CM
BH CV ECHO MEAS - EDV(CUBED): 79 ML
BH CV ECHO MEAS - EDV(MOD-SP2): 32.4 ML
BH CV ECHO MEAS - EDV(MOD-SP4): 54.4 ML
BH CV ECHO MEAS - EF(MOD-BP): 64.3 %
BH CV ECHO MEAS - EF(MOD-SP2): 49.4 %
BH CV ECHO MEAS - EF(MOD-SP4): 72.1 %
BH CV ECHO MEAS - ESV(CUBED): 14.9 ML
BH CV ECHO MEAS - ESV(MOD-SP2): 16.4 ML
BH CV ECHO MEAS - ESV(MOD-SP4): 15.2 ML
BH CV ECHO MEAS - FS: 42.7 %
BH CV ECHO MEAS - IVS/LVPW: 0.98 CM
BH CV ECHO MEAS - IVSD: 0.73 CM
BH CV ECHO MEAS - LA DIMENSION: 2.7 CM
BH CV ECHO MEAS - LAT PEAK E' VEL: 13.8 CM/SEC
BH CV ECHO MEAS - LV DIASTOLIC VOL/BSA (35-75): 34.2 CM2
BH CV ECHO MEAS - LV MASS(C)D: 93.5 GRAMS
BH CV ECHO MEAS - LV SYSTOLIC VOL/BSA (12-30): 9.5 CM2
BH CV ECHO MEAS - LVIDD: 4.3 CM
BH CV ECHO MEAS - LVIDS: 2.46 CM
BH CV ECHO MEAS - LVPWD: 0.74 CM
BH CV ECHO MEAS - MED PEAK E' VEL: 11.9 CM/SEC
BH CV ECHO MEAS - MV A MAX VEL: 69.4 CM/SEC
BH CV ECHO MEAS - MV DEC SLOPE: 554 CM/SEC2
BH CV ECHO MEAS - MV DEC TIME: 0.16 SEC
BH CV ECHO MEAS - MV E MAX VEL: 88.7 CM/SEC
BH CV ECHO MEAS - MV E/A: 1.28
BH CV ECHO MEAS - RVDD: 2.7 CM
BH CV ECHO MEAS - SV(MOD-SP2): 16 ML
BH CV ECHO MEAS - SV(MOD-SP4): 39.2 ML
BH CV ECHO MEAS - SVI(MOD-SP2): 10.1 ML/M2
BH CV ECHO MEAS - SVI(MOD-SP4): 24.6 ML/M2
BH CV ECHO MEASUREMENTS AVERAGE E/E' RATIO: 6.9
LEFT ATRIUM VOLUME INDEX: 9.9 ML/M2

## 2024-05-01 PROCEDURE — 93306 TTE W/DOPPLER COMPLETE: CPT

## 2024-05-01 NOTE — TELEPHONE ENCOUNTER
Caller: Riccardo Monika    Relationship: Self    Best call back number: [phone]     Who are you requesting to speak with (clinical staff, provider,  specific staff member): STAFF    What was the call regarding: PATIENT WOULD LIKE CALL BACK RE:  REFERRAL TO [facility] FOR VESTIBULAR PHYSCIAL THERAPY.    PLEASE CALL -THANK YOU

## 2024-05-02 ENCOUNTER — LAB (OUTPATIENT)
Dept: LAB | Facility: HOSPITAL | Age: 44
End: 2024-05-02
Payer: COMMERCIAL

## 2024-05-02 DIAGNOSIS — R10.30 LOWER ABDOMINAL PAIN: ICD-10-CM

## 2024-05-02 DIAGNOSIS — K92.1 BLOOD IN STOOL: ICD-10-CM

## 2024-05-02 DIAGNOSIS — R19.5 CHANGE IN CONSISTENCY OF STOOL: ICD-10-CM

## 2024-05-02 DIAGNOSIS — R15.2 FECAL URGENCY: ICD-10-CM

## 2024-05-02 LAB
CRP SERPL-MCNC: <0.3 MG/DL (ref 0–0.5)
ERYTHROCYTE [SEDIMENTATION RATE] IN BLOOD: 14 MM/HR (ref 0–20)

## 2024-05-02 PROCEDURE — 86140 C-REACTIVE PROTEIN: CPT

## 2024-05-02 PROCEDURE — 85652 RBC SED RATE AUTOMATED: CPT

## 2024-05-02 PROCEDURE — 36415 COLL VENOUS BLD VENIPUNCTURE: CPT

## 2024-05-07 ENCOUNTER — OFFICE VISIT (OUTPATIENT)
Dept: NEUROLOGY | Facility: CLINIC | Age: 44
End: 2024-05-07
Payer: COMMERCIAL

## 2024-05-07 ENCOUNTER — TELEPHONE (OUTPATIENT)
Dept: FAMILY MEDICINE CLINIC | Facility: CLINIC | Age: 44
End: 2024-05-07
Payer: COMMERCIAL

## 2024-05-07 VITALS
WEIGHT: 128 LBS | SYSTOLIC BLOOD PRESSURE: 110 MMHG | BODY MASS INDEX: 23.55 KG/M2 | HEART RATE: 70 BPM | HEIGHT: 62 IN | DIASTOLIC BLOOD PRESSURE: 70 MMHG

## 2024-05-07 DIAGNOSIS — M54.81 BILATERAL OCCIPITAL NEURALGIA: ICD-10-CM

## 2024-05-07 DIAGNOSIS — F07.81 POST CONCUSSIVE SYNDROME: Primary | ICD-10-CM

## 2024-05-07 PROCEDURE — 99214 OFFICE O/P EST MOD 30 MIN: CPT | Performed by: PSYCHIATRY & NEUROLOGY

## 2024-05-09 DIAGNOSIS — Z72.820 POOR SLEEP: ICD-10-CM

## 2024-05-09 DIAGNOSIS — F07.81 POST CONCUSSIVE SYNDROME: Primary | ICD-10-CM

## 2024-05-10 ENCOUNTER — TELEPHONE (OUTPATIENT)
Dept: FAMILY MEDICINE CLINIC | Facility: CLINIC | Age: 44
End: 2024-05-10
Payer: COMMERCIAL

## 2024-05-10 ENCOUNTER — HOSPITAL ENCOUNTER (OUTPATIENT)
Dept: RESPIRATORY THERAPY | Facility: HOSPITAL | Age: 44
Discharge: HOME | End: 2024-05-10
Payer: COMMERCIAL

## 2024-05-10 ENCOUNTER — TELEPHONE (OUTPATIENT)
Dept: FAMILY MEDICINE CLINIC | Facility: CLINIC | Age: 44
End: 2024-05-10

## 2024-05-10 DIAGNOSIS — R06.09 DYSPNEA ON EXERTION: ICD-10-CM

## 2024-05-10 PROCEDURE — 94726 PLETHYSMOGRAPHY LUNG VOLUMES: CPT

## 2024-05-10 PROCEDURE — 94729 DIFFUSING CAPACITY: CPT

## 2024-05-10 PROCEDURE — 94060 EVALUATION OF WHEEZING: CPT

## 2024-05-10 RX ORDER — ALBUTEROL SULFATE 2.5 MG/3ML
2.5 SOLUTION RESPIRATORY (INHALATION) ONCE
Status: COMPLETED | OUTPATIENT
Start: 2024-05-10 | End: 2024-05-10

## 2024-05-10 RX ADMIN — ALBUTEROL SULFATE 2.5 MG: 2.5 SOLUTION RESPIRATORY (INHALATION) at 14:19

## 2024-05-10 NOTE — TELEPHONE ENCOUNTER
Hub staff attempted to follow warm transfer process and was unsuccessful     Caller: Monika Gu    Relationship to patient: Self    Best call back number: [phone]    PATIENT MISSED PHONE CALL FROM OFFICE AND RETURNING PHONE CALL. PATIENT DID NOT KNOW THE NAME WHO CALLED.

## 2024-05-13 ENCOUNTER — TELEPHONE (OUTPATIENT)
Dept: FAMILY MEDICINE CLINIC | Facility: CLINIC | Age: 44
End: 2024-05-13

## 2024-05-13 ENCOUNTER — OFFICE VISIT (OUTPATIENT)
Dept: FAMILY MEDICINE CLINIC | Facility: CLINIC | Age: 44
End: 2024-05-13
Payer: OTHER MISCELLANEOUS

## 2024-05-13 VITALS
TEMPERATURE: 98.4 F | DIASTOLIC BLOOD PRESSURE: 64 MMHG | BODY MASS INDEX: 23.55 KG/M2 | HEIGHT: 62 IN | HEART RATE: 78 BPM | SYSTOLIC BLOOD PRESSURE: 105 MMHG | OXYGEN SATURATION: 96 % | WEIGHT: 128 LBS

## 2024-05-13 DIAGNOSIS — F07.81 POST CONCUSSIVE SYNDROME: Primary | ICD-10-CM

## 2024-05-13 PROCEDURE — 99213 OFFICE O/P EST LOW 20 MIN: CPT | Performed by: STUDENT IN AN ORGANIZED HEALTH CARE EDUCATION/TRAINING PROGRAM

## 2024-05-13 NOTE — LETTER
May 13, 2024     Patient: Monika Gu   YOB: 1980   Date of Visit: 5/13/2024       To Whom It May Concern:    It is my medical opinion that Monika Gu may return to work however, she should be returning to work in an incremental fashion.  For example if she is able to tolerate working 1 hour a day she could advance to 2 hours and so forth.  She has postconcussive syndrome, as noted in the [facility] paperwork, this can take up to a year to fully recover.  She will need to be able to take frequent breaks and have exposure to loud sounds limited.  She may require extra time to process information.  Being flexible with Monika is most important to her recovery as she may have good days and bad days and it is important to note that this is a recovery process    Please call me if you have any questions at all about the [facility] paperwork or the contents of this letter.       Sincerely,        Tyrone Hagan MD    CC: No Recipients

## 2024-05-13 NOTE — PROGRESS NOTES
Subjective:       Monika Gu is a 43 y.o. female who presents for paperwork for postconcussive syndrome.     Ms. Gu suffered a concussion in October of last year.  Her subsequent course was complicated by development of postconcussive syndrome. This required me to refer her to multiple specialist including neurologist.  She most recently saw neurologist, Dr. Loyd on 5/7/2024.  I have reviewed that note from that encounter.  At that time, he advised in the note that she can slowly begin to transition to return to work and he did advise her to do that in a stepwise function.  She subsequently has requested for me to fill out [facility] paperwork for her to return to work.  I attempted to do this without her having to come in for an appointment but unfortunately she says that the date that she was last seen must say May and I have not seen her this month.  Today, she presents for follow-up so that I can complete this paperwork.  We reviewed the office note from neurologist.  I have updated paperwork for her.      Past Medical Hx:  Past Medical History:   Diagnosis Date    Abnormal ECG 2/2/2024    Sinus tachycardia    Allergic     Allergic rhinitis 2002    Seasonal allergy symptoms all seasons    Arrhythmia     Arthritis     Bilateral occipital neuralgia 5/7/2024    Cancer 3/19    Adenocarcinoma in situ    Concussion 10/2023    L FACIAL INJURY foot    Difficulty walking 10/25    Dizziness October 26, 2023    Concussion    Endometriosis     Fracture of nasal bones 1996    Left side of bridge of nose    GERD (gastroesophageal reflux disease)     Headache     Headache, tension-type 10/25    History of medical problems     Hyperthyroidism     Memory loss 10/25    Migraine 10/25    Nosebleed 2002    Dry weather and seasonal changes    Shingles     Syncope 10/25    Tinnitus October 26, 2023    Concussion       Past Surgical Hx:  Past Surgical History:   Procedure Laterality Date    APPENDECTOMY      HYSTERECTOMY       OOPHORECTOMY  2019    ADENOCARCINOMA    TONSILLECTOMY         Current Meds:    Current Outpatient Medications:     Azelastine HCl 137 MCG/SPRAY solution, by Each Nare route 2 (Two) Times a Day As Needed. USE 2 SPRAYS PER NOSTRIL TWICE A DAY AS NEEDED, Disp: , Rfl:     dexlansoprazole (DEXILANT) 60 MG capsule, Take 1 capsule by mouth Daily., Disp: 90 capsule, Rfl: 1    dicyclomine (BENTYL) 10 MG capsule, Take 1 capsule by mouth 4 (Four) Times a Day Before Meals & at Bedtime. (Patient taking differently: Take 1 capsule by mouth 4 (Four) Times a Day Before Meals & at Bedtime. prn), Disp: 16 capsule, Rfl: 0    estradiol (ESTRACE) 0.1 MG/GM vaginal cream, Insert 2 g into the vagina Daily., Disp: , Rfl:     levothyroxine (SYNTHROID, LEVOTHROID) 50 MCG tablet, Mon, Tues, Thurs, Friday, and Sun, Disp: 24 tablet, Rfl: 6    levothyroxine (SYNTHROID, LEVOTHROID) 75 MCG tablet, Take 1 tablet by mouth Take As Directed. Take one tablet Wed and Sat, Disp: 8 tablet, Rfl: 6    metoprolol succinate XL (TOPROL-XL) 25 MG 24 hr tablet, Take 1 tablet by mouth Daily., Disp: 90 tablet, Rfl: 3    SUMAtriptan (IMITREX) 25 MG tablet, Take 1 tablet by mouth 1 (One) Time., Disp: , Rfl:     vitamin D3 125 MCG (5000 UT) capsule capsule, Take 1 capsule by mouth Daily., Disp: , Rfl:     Allergies:  Allergies   Allergen Reactions    Amitriptyline Palpitations       Family Hx:  Family History   Problem Relation Age of Onset    Diabetes Father     Migraines Father         Whole family    Heart disease Father     Diabetes Brother     Cancer Maternal Aunt     Hypertension Mother     Thyroid disease Mother     Migraines Mother         Whole family    Anemia Mother     Cancer Paternal Grandmother     Colon cancer Neg Hx         Social History:  Social History     Socioeconomic History    Marital status: Single   Tobacco Use    Smoking status: Never     Passive exposure: Never    Smokeless tobacco: Never   Vaping Use    Vaping status: Never Used  "  Substance and Sexual Activity    Alcohol use: Not Currently    Drug use: Never    Sexual activity: Not Currently     Partners: Male       Review of Systems  Review of Systems   Neurological:  Positive for dizziness.       Objective:     /64 (BP Location: Left arm, Patient Position: Sitting)   Pulse 78   Temp 98.4 °F (36.9 °C) (Temporal)   Ht 157.5 cm (62.01\")   Wt 58.1 kg (128 lb)   SpO2 96%   BMI 23.40 kg/m²   Physical Exam  Constitutional:       General: She is not in acute distress.     Appearance: Normal appearance. She is not ill-appearing, toxic-appearing or diaphoretic.   Pulmonary:      Effort: Pulmonary effort is normal. No respiratory distress.   Neurological:      Mental Status: She is alert.          Assessment/Plan:     Diagnoses and all orders for this visit:    1. Post concussive syndrome (Primary)    Ms. Gu suffered a concussion in October of last year.  Her subsequent course was complicated by development of postconcussive syndrome. This required me to refer her to multiple specialist including neurologist.  She most recently saw neurologist, Dr. Loyd on 5/7/2024.  I have reviewed that note from that encounter.  At that time, he advised in the note that she can slowly begin to transition to return to work and he did advise her to do that in a stepwise function.  She subsequently has requested for me to fill out [facility] paperwork for her to return to work.  I attempted to do this without her having to come in for an appointment but unfortunately she says that the date that she was last seen must say May and I have not seen her this month.  Today, she presents for follow-up so that I can complete this paperwork.  We reviewed the office note from neurologist.  I have updated paperwork for her.        Follow-up:     Return if symptoms worsen or fail to improve, for Next scheduled follow up.    Preventative:  Health Maintenance   Topic Date Due    HEPATITIS C SCREENING  Never done    " ANNUAL PHYSICAL  Never done    COVID-19 Vaccine (3 - 2023-24 season) 05/15/2024 (Originally 9/1/2023)    INFLUENZA VACCINE  08/01/2024    MAMMOGRAM  08/17/2025    TDAP/TD VACCINES (3 - Tdap) 09/29/2033    Pneumococcal Vaccine 0-64  Aged Out           This document has been electronically signed by Tyrone Hagan MD on May 13, 2024 14:30 EDT       Parts of this note are electronic transcriptions/translations of spoken language to printed text using the Dragon Dictation system.

## 2024-05-13 NOTE — TELEPHONE ENCOUNTER
THIS ENCOUNTER IS IN REFERENCE TO MESSAGES ON 05/10/24 PT CALLED WANTING TO GET FMLA PAPERWORK DATE CHANGED AND DATED FOR 05/07/24. PCP STATED HE DID NOT SEE PT ON THAT DATE SO HE CAN'T CHANGE DATE  UNLESS PT HAS OV I  HAD CALLED LEFT PT VM TO CALL OFFICE. PT RETURNED MY CALL AND NOW HAS OV SCHEDULED TO GO OVER FMLA PAPERWORK WITH PCP

## 2024-05-13 NOTE — LETTER
May 13, 2024     Patient: Monika Gu   YOB: 1980   Date of Visit: 5/13/2024       To Whom It May Concern:    It is my medical opinion that Monika Gu may return to work however, she should be returning to work in an incremental fashion.  For example if she is able to tolerate working 1 hour a day she could advance to 2 hours and so forth.  She has postconcussive syndrome, as noted in the worker's comp paperwork, this can take up to a year to fully recover.  She will need to be able to take frequent breaks and have exposure to loud sounds limited.  She may require extra time to process information.  Being flexible with Monika is most important to her recovery as she may have good days and bad days and it is important to note that this is a recovery process     Please call me if you have any questions at all about the worker's comp paperwork or the contents of this letter.    Sincerely,        Tyrone Hagan MD    CC:   No Recipients

## 2024-05-15 ENCOUNTER — PROCEDURE VISIT (OUTPATIENT)
Dept: NEUROLOGY | Facility: CLINIC | Age: 44
End: 2024-05-15
Payer: COMMERCIAL

## 2024-05-15 DIAGNOSIS — M54.81 BILATERAL OCCIPITAL NEURALGIA: Primary | ICD-10-CM

## 2024-05-15 RX ORDER — LIDOCAINE HYDROCHLORIDE 20 MG/ML
5 INJECTION, SOLUTION EPIDURAL; INFILTRATION; INTRACAUDAL; PERINEURAL ONCE
Status: COMPLETED | OUTPATIENT
Start: 2024-05-15 | End: 2024-05-15

## 2024-05-15 RX ORDER — METHYLPREDNISOLONE ACETATE 40 MG/ML
40 INJECTION, SUSPENSION INTRA-ARTICULAR; INTRALESIONAL; INTRAMUSCULAR; SOFT TISSUE ONCE
Status: COMPLETED | OUTPATIENT
Start: 2024-05-15 | End: 2024-05-15

## 2024-05-15 RX ADMIN — LIDOCAINE HYDROCHLORIDE 5 ML: 20 INJECTION, SOLUTION EPIDURAL; INFILTRATION; INTRACAUDAL; PERINEURAL at 08:30

## 2024-05-15 RX ADMIN — LIDOCAINE HYDROCHLORIDE 5 ML: 20 INJECTION, SOLUTION EPIDURAL; INFILTRATION; INTRACAUDAL; PERINEURAL at 08:31

## 2024-05-15 RX ADMIN — METHYLPREDNISOLONE ACETATE 40 MG: 40 INJECTION, SUSPENSION INTRA-ARTICULAR; INTRALESIONAL; INTRAMUSCULAR; SOFT TISSUE at 08:31

## 2024-05-15 NOTE — PROGRESS NOTES
Occipital Nerve Block Procedure Note:    PROCEDURE IN DETAIL:  The patient was injected in the bilateral GREATER and LESSER occipital nerves with 2% lidocaine, a total of 2.5 mL times four, and Depomedrol total of 0.5 mL or 20 mg times four after obtaining written consent. Sterile technique was used including sterile gloves and needles. Injection sites identified using known anatomical landmarks.  The area to be injected was prepped with sterilizing agent. The patient tolerated the procedure without any complications but returned when she started feeling the numbness and felt slightly woozy which resolved with drinking water and eating a protein bar. She had not had much to eat this morning. She will be returning for follow up as previously scheduled.       bilateral GREATER and LESSER occipital nerve blocks.

## 2024-05-17 ENCOUNTER — TREATMENT (OUTPATIENT)
Dept: PHYSICAL THERAPY | Facility: CLINIC | Age: 44
End: 2024-05-17
Payer: OTHER MISCELLANEOUS

## 2024-05-17 ENCOUNTER — TELEPHONE (OUTPATIENT)
Dept: NEUROLOGY | Facility: CLINIC | Age: 44
End: 2024-05-17

## 2024-05-17 DIAGNOSIS — F07.81 POST CONCUSSIVE SYNDROME: ICD-10-CM

## 2024-05-17 DIAGNOSIS — R26.89 BALANCE DISORDER: Primary | ICD-10-CM

## 2024-05-17 NOTE — PROGRESS NOTES
Re-Assessment / Re-Certification  [address] [place] [id number]      Patient: Monika Gu   : 1980  Diagnosis/ICD-10 Code:  Balance disorder [R26.89]  Referring practitioner: No ref. provider found  Date of Initial Visit: Type: THERAPY  Noted: 2024  Today's Date: 2024  Patient seen for 26 sessions      Subjective:     Subjective Questionnaire: Optimal:   Clinical Progress: improved  Home Program Compliance: Yes  Treatment has included: therapeutic exercise, neuromuscular re-education, manual therapy, and therapeutic activity    Subjective     Pt reports she has returned back to work this week. Reports she is working 3.5 hours for 5 days a week. Pt reports she is able to go home early and/or not come into work if she is having a bad day. 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 Pt reports she did have occipital injections done this week. Pt reports she had a reaction to the medication but has noticed her neck doesn't feel as tight. Pt reports this week she is noticing feeling more dizzy and not feeling as great this week but reports it could be due to the injections and return back to work. Pt reports she is sleeping more this week. Pt reports her greatest challenge continues to be noise.     Objective       SLS on L LE EC: 3 seconds increase in ankle and hip strategy in a louder room and patient able to perform for 8 seconds in a quiet room  SLS on R LE EC: 10 seconds in a loud room and 14 seconds in quiet room  Tandem L in front with EC: 4 seconds   Tandem R front with EC: 8 seconds    FGA:  - Pt did become symptomatic after testing and required a sitting break.       Assessment/Plan    Pt was able to improve her FGA score today compared to last progress note. Pt however did become symptomatic after testing and required a sitting rest break. Pt did have a decrease  in balance testing today compared to last time assessed. Pt did have an improvement when patient went to quiet room, but still less then last reassessment. Pt overall though has made great progress. Pt has returned back to work with restrictions. Pt does still have increase in sensitivity to sound, decrease in balance, and decrease in functional mobility. Pt requires skilled PT in order to address deficits.       Goals  Plan Goals: 1. Pt has difficulty ambulating in environment.     1. LTG 1: 12 weeks: Pt will be able to ambulate in store for 30 minutes with minimal to no signs and symptoms of nausea.     STATUS: IN PROGRESS     STG 1: 6 weeks: Pt will be able to ambulate throughout clinic with minimal to no signs and symptoms of nausea for a total of 15 minutes.      STATUS:  MET     2. The patient has gait dysfunction.     LTG 2: 12 weeks:  The patient will demonstrate > 29 / 30 on the Functional Gait Assessment for improved functional mobility.      STATUS:  IN PROGRESS     STG 2: The patient will demonstrate > 25 / 30 on the Functional Gait Assessment for improved functional mobility.      STATUS:  MET     3. The patient has difficulty with balance.      LTG 3: 12 weeks: The patient will hold the sharpened romberg position with eyes closed for 20 seconds and SLS with EC for 10 seconds in order to improve balance and decrease risk of falling.                             STATUS: IN PROGRESS     STG 3: 6 weeks: The patient will hold the romberg position with eyes opened for 10 seconds in order to improve balance and decrease risk of falling.                             STATUS: MET     LTG 4: Pt will be able to perform all directions of visual tracking test with minimal to no signs and symptoms of nausea in order to allow for patient to scan hallways at work.      STATUS: MET    Progress toward previous goals: Partially Met    See Exercise, Manual, and Modality Logs for complete treatment.         Recommendations:  Continue as planned  Timeframe: 2x a week for 1 month  Prognosis to achieve goals: good    PT Signature: Electronically signed by ALEXANDER Harrington LICENSE: [id number]      Based upon review of the patient's progress and continued therapy plan, it is my medical opinion that Monika Gu should continue physical therapy treatment at [facility] PHYSICAL THERAPY  [address] [place] [address] [phone].    Signature: __________________________________      Timed:           Timed:         Manual Therapy:    0     mins  08419;     Therapeutic Exercise:    0     mins  04970;     Neuromuscular Go:    23    mins  77709;    Therapeutic Activity:     23     mins  06270;     Gait Trainin     mins  12478;     Ultrasound:     0     mins  79360;    Ionto                               0    mins   99562  Self pay                         0     mins [email or web address]    Un-Timed:  Electrical Stimulation:    0     mins  70452 (MC )  Canalith Repos    0     mins 29879  Dry Needling     0     mins self-pay  Traction     0     mins 85107  Re-Eval                           0    mins  25395    Timed Treatment:   46   mins   Total Treatment:     46   mins    PT SIGNATURE: Electronically signed by ALEXANDER Harrington LICENSE: [id number]     DATE TREATMENT INITIATED: 2024    90 Day Recertification  Certification Period: 2024 thru 2024  I certify that the therapy services are furnished while this patient is under my care.  The services outlined above are required by this patient, and will be reviewed every 90 days.     PHYSICIAN:    NPI:       DATE:     Please sign and return via fax to [phone] Thank you, [facility].

## 2024-05-17 NOTE — TELEPHONE ENCOUNTER
Provider: DR. BOOGIE    Caller: GORAN GALICIA    Relationship to Patient: SELF    Pharmacy: [facility]    Phone Number: [phone]    Reason for Call: ALLERGIC REACTION TO INJECTION OF RASH AND HIVES    When was the patient last seen: 5/15    When did it start: YESTERDAY    Where is it located: HEAD, BACK OF NECK, AND BETWEEN SHOULDER BLADES AND SPREADING    Characteristics of symptom/severity: 4 OUT OF 10    Timing- Is it constant or intermittent: INTERMITTENT    What makes it worse: HOT    What makes it better: COLD

## 2024-05-20 ENCOUNTER — TELEPHONE (OUTPATIENT)
Dept: GASTROENTEROLOGY | Facility: CLINIC | Age: 44
End: 2024-05-20
Payer: COMMERCIAL

## 2024-05-20 ENCOUNTER — TELEPHONE (OUTPATIENT)
Dept: FAMILY MEDICINE CLINIC | Facility: CLINIC | Age: 44
End: 2024-05-20
Payer: COMMERCIAL

## 2024-05-20 ENCOUNTER — TELEPHONE (OUTPATIENT)
Dept: NEUROLOGY | Facility: CLINIC | Age: 44
End: 2024-05-20

## 2024-05-20 NOTE — TELEPHONE ENCOUNTER
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 AS THEY CAN NOT ACCOMMODATE HER RIGHT NOW. PT WANTS TO KNOW IF SHE CAN SCHEDULE OV WITH YOU IR DOES THIS NEED TO GO TO NEURO DR. PLEASE ADVISE

## 2024-05-20 NOTE — TELEPHONE ENCOUNTER
I think it would be good to have in her chart. From what I can find, reactions to lidocaine are much more common than corticosteroids. It also seems to be a delayed hypersensitivity (which is also much more common with lidocaine) since immediate should start within minutes to an hour. For that reason, I'd like to avoid putting a corticosteroid allergy in her chart so someone doesn't try avoiding corticosteroid in the future if needed. Delayed reactions are generally not considered dangerous. I documented a lidocaine allergy with comments in her chart. Let me know if you want me to modify in any way.

## 2024-05-20 NOTE — TELEPHONE ENCOUNTER
Caller: Monika Gu    Relationship: Self    Best call back number: [phone]    Who are you requesting to speak with (clinical staff, provider,  specific staff member): DR. ROSALES     What was the call regarding: PATIENT WOULD LIKE A CALL. SHE MENTIONED THAT THIS IS REGARDING A RELEASE TO WORK THAT SHE HAD GOTTEN AND HER JOB HAS PLACED HER ON FULL TIME WORKER'S COMP AFTER BEING RELEASED.

## 2024-05-20 NOTE — TELEPHONE ENCOUNTER
DELIA DUMONT - [phone] - FIELD NURSE FOLLOWING PT AND REFERRAL TO [facility] FOR NEURO PSYCH EVAL.FOR WORK COMP.    SHE IS CALLING BECAUSE SHE SAYS THE OFFICE NEEDS TO UPLOAD FOR AUTHORIZATION FOR [facility] TO COMPLETE THE EVALUATION.

## 2024-05-20 NOTE — TELEPHONE ENCOUNTER
I believe I am trying to fill this out a couple times to the best of my ability to avoid neuro having to fill this out but if work is wanting even more information, it would probably better at this point to have neuro involved.      This document has been electronically signed by Tyrone Hagan MD on May 20, 2024 16:41 EDT

## 2024-05-21 ENCOUNTER — TELEPHONE (OUTPATIENT)
Dept: GASTROENTEROLOGY | Facility: CLINIC | Age: 44
End: 2024-05-21
Payer: COMMERCIAL

## 2024-05-21 ENCOUNTER — TREATMENT (OUTPATIENT)
Dept: PHYSICAL THERAPY | Facility: CLINIC | Age: 44
End: 2024-05-21
Payer: OTHER MISCELLANEOUS

## 2024-05-21 DIAGNOSIS — R26.89 BALANCE DISORDER: Primary | ICD-10-CM

## 2024-05-21 DIAGNOSIS — F07.81 POST CONCUSSIVE SYNDROME: ICD-10-CM

## 2024-05-21 NOTE — TELEPHONE ENCOUNTER
PT CALLED BACK STATES SHE WILL CALL NEURO TO SEE WHAT THEY ADVISE PT ALSO STATED THAT WORKERS COMP CASE EXAMINER IS GOING TO REVIEW THE RECORDS TO SEE IF A POSSIBLE SECOND OPINION IS NEEDED SO THEY WILL SUBMIT INFORMATION FOR REFERRAL ON THERE END IF ONE IS NEEDED.

## 2024-05-21 NOTE — PROGRESS NOTES
Physical Therapy Daily Note  [address] [place] [address]    VISIT#: 27    Subjective   Monika Gu reports she is back off work. Pt reports she was put off work again starting this week. Pt reports that she did have vestibular testing performed which revealed:     Results from today’s evaluation indicate a left peripheral vestibular weakness. Today's testing indicated no concern for central vestibular dysfunction as all central subtests were within normal limits.    Objective     See Exercise, Manual, and Modality Logs for complete treatment.     Assessment/Plan    Continued with patient's exercises today and patient was more symptomatic today with lower level exercises. Pt had more symptoms today with horizontal saccades. Pt reports she feels more stressed this week which could be contributing to increase in symptoms. Will continue to progress patient as able in order to return patient back to maximum function.       Progress per Plan of Care and Progress strengthening /stabilization /functional activity            Timed:                 Manual Therapy:    0     mins  59799;     Therapeutic Exercise:    30     mins  01714;     Neuromuscular Go:    8    mins  21765;    Therapeutic Activity:     8     mins  42150;     Gait Trainin     mins  03624;     Ultrasound:     0     mins  51389;    Ionto                               0    mins   39601  Self pay                         0     mins [email or web address]    Un-Timed:  Electrical Stimulation:    0     mins  88956 ( )  Canalith Repos    0     mins 64035  Dry Needling     0     mins self-pay  Traction     0     mins 46392    Timed Treatment:   46   mins   Total Treatment:     46   mins    Adna Portillo PT  Physical Therapist    PT SIGNATURE: Electronically signed by Adan Portillo PT  [place] LICENSE: [id number]

## 2024-05-21 NOTE — TELEPHONE ENCOUNTER
Called patient to reschedule appointment on 05.28.24 due to Dr. Manriquez been out of office. [facility]

## 2024-05-22 ENCOUNTER — OFFICE VISIT (OUTPATIENT)
Dept: SLEEP MEDICINE | Facility: HOSPITAL | Age: 44
End: 2024-05-22
Payer: COMMERCIAL

## 2024-05-22 VITALS
WEIGHT: 127 LBS | HEART RATE: 65 BPM | HEIGHT: 62 IN | BODY MASS INDEX: 23.37 KG/M2 | DIASTOLIC BLOOD PRESSURE: 66 MMHG | OXYGEN SATURATION: 99 % | SYSTOLIC BLOOD PRESSURE: 109 MMHG

## 2024-05-22 DIAGNOSIS — F07.81 POST CONCUSSIVE SYNDROME: Primary | ICD-10-CM

## 2024-05-22 DIAGNOSIS — F51.04 PSYCHOPHYSIOLOGICAL INSOMNIA: ICD-10-CM

## 2024-05-22 DIAGNOSIS — G47.33 OSA (OBSTRUCTIVE SLEEP APNEA): ICD-10-CM

## 2024-05-22 DIAGNOSIS — G25.81 RESTLESS LEGS SYNDROME (RLS): ICD-10-CM

## 2024-05-22 PROCEDURE — G0463 HOSPITAL OUTPT CLINIC VISIT: HCPCS
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
## 2024-05-22 NOTE — TELEPHONE ENCOUNTER
LVM for pt to return our call to reschedule her 5.8.24  Appt due to Dr Manriquez going to be out of the office

## 2024-05-24 ENCOUNTER — TREATMENT (OUTPATIENT)
Dept: PHYSICAL THERAPY | Facility: CLINIC | Age: 44
End: 2024-05-24
Payer: OTHER MISCELLANEOUS

## 2024-05-24 DIAGNOSIS — F07.81 POST CONCUSSIVE SYNDROME: ICD-10-CM

## 2024-05-24 DIAGNOSIS — R26.89 BALANCE DISORDER: Primary | ICD-10-CM

## 2024-05-24 NOTE — PROGRESS NOTES
Physical Therapy Daily Note  [address] [place] [address]    VISIT#: 28    Subjective   Monika Gu reports she is feeling better today compared to previous session.      Objective     See Exercise, Manual, and Modality Logs for complete treatment.       Assessment/Plan    Continued to progressing patient's exercises and patient tolerated, but did start becoming nauseous when the clinic got louder when searching for objects. Pt also got very nauseous and had significant increase in neck pain with higher level balance exercises. Pt did have increase in tightness in upper trap and levator, therefore performed STM and thoracic mobilizations and patient reported a decrease in pain. Educated patient to use heat, stretch, and use theracane at home with pain relief. Will continue to progress patient as able.     Progress per Plan of Care and Progress strengthening /stabilization /functional activity            Timed:                 Manual Therapy:    10     mins  33546;     Therapeutic Exercise:    10     mins  79946;     Neuromuscular Go:    8    mins  50452;    Therapeutic Activity:     25     mins  57770;     Gait Trainin     mins  73333;     Ultrasound:     0     mins  66436;    Ionto                               0    mins   18859  Self pay                         0     mins [email or web address]    Un-Timed:  Electrical Stimulation:    0     mins  72659 ( )  Canalith Repos    0     mins 55207  Dry Needling     0     mins self-pay  Traction     0     mins 09209    Timed Treatment:   53   mins   Total Treatment:     53   mins    Adan Portillo PT  Physical Therapist    PT SIGNATURE: Electronically signed by Adan Portillo PT  [place] LICENSE: [id number]

## 2024-05-24 NOTE — TELEPHONE ENCOUNTER
Spoke with santiago fuchs we don't have any of her information on file. Asked if she could fax us the information including fax so we can send her the requested information. She carina

## 2024-05-28 ENCOUNTER — TREATMENT (OUTPATIENT)
Dept: PHYSICAL THERAPY | Facility: CLINIC | Age: 44
End: 2024-05-28
Payer: OTHER MISCELLANEOUS

## 2024-05-28 DIAGNOSIS — R26.89 BALANCE DISORDER: Primary | ICD-10-CM

## 2024-05-28 DIAGNOSIS — F07.81 POST CONCUSSIVE SYNDROME: ICD-10-CM

## 2024-05-28 NOTE — PROGRESS NOTES
Physical Therapy Daily Note  [address] [place] [address]    VISIT#: 29    Subjective   Monika Gu reports no new complaints today.       Objective     See Exercise, Manual, and Modality Logs for complete treatment.     Assessment/Plan    Continued with progressing patient's exercises to include all exercises being in busy clinic. Pt was able to find objects today in clinic quicker compared to last session with less symptoms. Performed an exercise today that tested coordination and balance along with having several people talking while patient performed and patient was significantly challenged with exercise and became very nauseous with exercise. Pt also became nauseous with walking and changing directions today. Pt tolerated exercises well, but was very nauseous at end of therapy session. Will continue to progress patient as able.     Progress per Plan of Care and Progress strengthening /stabilization /functional activity            Timed:                 Manual Therapy:    0     mins  80473;     Therapeutic Exercise:    0     mins  58317;     Neuromuscular Go:    10    mins  80527;    Therapeutic Activity:     23     mins  33539;     Gait Trainin     mins  45069;     Ultrasound:     0     mins  05606;    Ionto                               0    mins   13688  Self pay                         0     mins [email or web address]    Un-Timed:  Electrical Stimulation:    0     mins  49376 ( )  Canalith Repos    0     mins 15875  Dry Needling     0     mins self-pay  Traction     0     mins 54258    Timed Treatment:   33   mins   Total Treatment:     33   mins    Adan Portillo PT  Physical Therapist    PT SIGNATURE: Electronically signed by Adan Portillo PT  [place] LICENSE: [id number]

## 2024-05-29 ENCOUNTER — TELEPHONE (OUTPATIENT)
Dept: NEUROLOGY | Facility: CLINIC | Age: 44
End: 2024-05-29
Payer: COMMERCIAL

## 2024-05-29 NOTE — TELEPHONE ENCOUNTER
NEW LOCATION DUE TO APPT AT [facility] BEING NOV 2024    Caller: Monika Gu    Relationship: Self    Best call back number: [phone]    What is the medical concern/diagnosis:   NEURO PHSYCH EXAM    What specialty or service is being requested:     What is the provider, practice or medical service name:      What is the office location:   [facility] [place]  [facility]  [facility] [place]    What is the office phone number:     Any additional details:   PT HAS BEEN SCHEDULED AT [facility] BUT HER APPT IS NOT UNTIL NOV 2024 AND SHE IS ON THE WAIT LIST FOR SOONER APPT.    PT WORKS FOR [employer] AND ON WORKMAN'S COMP. PT TRIED TO RETURN TO WORK PART TIME. 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 HER JOB WILL ONLY BE HELD FOR HER UNTIL OCT SO THE NOVEMBER APPT AT [facility] WON'T WORK.    PT IS REQUESTING ANGELITA TO CALL HER BACK TO DISCUSS THIS REFERRAL AND HER NOT BEING ABLE TO RETURN TO WORK.

## 2024-05-31 ENCOUNTER — LAB (OUTPATIENT)
Dept: LAB | Facility: HOSPITAL | Age: 44
End: 2024-05-31
Payer: COMMERCIAL

## 2024-05-31 ENCOUNTER — PATIENT ROUNDING (BHMG ONLY) (OUTPATIENT)
Dept: URGENT CARE | Facility: CLINIC | Age: 44
End: 2024-05-31
Payer: COMMERCIAL

## 2024-05-31 ENCOUNTER — OFFICE VISIT (OUTPATIENT)
Dept: OTOLARYNGOLOGY | Facility: CLINIC | Age: 44
End: 2024-05-31
Payer: COMMERCIAL

## 2024-05-31 ENCOUNTER — PROCEDURE VISIT (OUTPATIENT)
Dept: OTOLARYNGOLOGY | Facility: CLINIC | Age: 44
End: 2024-05-31
Payer: OTHER MISCELLANEOUS

## 2024-05-31 ENCOUNTER — TREATMENT (OUTPATIENT)
Dept: PHYSICAL THERAPY | Facility: CLINIC | Age: 44
End: 2024-05-31
Payer: OTHER MISCELLANEOUS

## 2024-05-31 VITALS — BODY MASS INDEX: 24.03 KG/M2 | WEIGHT: 130.6 LBS | HEIGHT: 62 IN | TEMPERATURE: 97.2 F

## 2024-05-31 DIAGNOSIS — H83.8X2 SUPERIOR SEMICIRCULAR CANAL DEHISCENCE OF LEFT EAR: ICD-10-CM

## 2024-05-31 DIAGNOSIS — F07.81 POST CONCUSSIVE SYNDROME: ICD-10-CM

## 2024-05-31 DIAGNOSIS — H90.12 CONDUCTIVE HEARING LOSS OF LEFT EAR WITH UNRESTRICTED HEARING OF RIGHT EAR: Primary | ICD-10-CM

## 2024-05-31 DIAGNOSIS — R26.89 BALANCE DISORDER: Primary | ICD-10-CM

## 2024-05-31 DIAGNOSIS — H90.12 CONDUCTIVE HEARING LOSS OF LEFT EAR WITH UNRESTRICTED HEARING OF RIGHT EAR: ICD-10-CM

## 2024-05-31 DIAGNOSIS — H93.13 TINNITUS OF BOTH EARS: ICD-10-CM

## 2024-05-31 DIAGNOSIS — R26.89 IMBALANCE: Primary | ICD-10-CM

## 2024-05-31 DIAGNOSIS — H93.232: ICD-10-CM

## 2024-05-31 DIAGNOSIS — Z87.898 HISTORY OF FATIGUE: ICD-10-CM

## 2024-05-31 DIAGNOSIS — R42 VERTIGO: ICD-10-CM

## 2024-05-31 DIAGNOSIS — H93.A3 PULSATILE TINNITUS OF BOTH EARS: ICD-10-CM

## 2024-05-31 LAB
ALBUMIN SERPL-MCNC: 4.5 G/DL (ref 3.5–5.2)
ALBUMIN/GLOB SERPL: 1.4 G/DL
ALP SERPL-CCNC: 98 U/L (ref 39–117)
ALT SERPL W P-5'-P-CCNC: 12 U/L (ref 1–33)
ANION GAP SERPL CALCULATED.3IONS-SCNC: 11.9 MMOL/L (ref 5–15)
AST SERPL-CCNC: 18 U/L (ref 1–32)
BASOPHILS # BLD AUTO: 0.07 10*3/MM3 (ref 0–0.2)
BASOPHILS NFR BLD AUTO: 0.8 % (ref 0–1.5)
BILIRUB SERPL-MCNC: 0.2 MG/DL (ref 0–1.2)
BUN SERPL-MCNC: 18 MG/DL (ref 6–20)
BUN/CREAT SERPL: 19.6 (ref 7–25)
CALCIUM SPEC-SCNC: 9 MG/DL (ref 8.6–10.5)
CHLORIDE SERPL-SCNC: 103 MMOL/L (ref 98–107)
CO2 SERPL-SCNC: 27.1 MMOL/L (ref 22–29)
CREAT SERPL-MCNC: 0.92 MG/DL (ref 0.57–1)
DEPRECATED RDW RBC AUTO: 44.5 FL (ref 37–54)
EGFRCR SERPLBLD CKD-EPI 2021: 78.9 ML/MIN/1.73
EOSINOPHIL # BLD AUTO: 0.29 10*3/MM3 (ref 0–0.4)
EOSINOPHIL NFR BLD AUTO: 3.4 % (ref 0.3–6.2)
ERYTHROCYTE [DISTWIDTH] IN BLOOD BY AUTOMATED COUNT: 13.2 % (ref 12.3–15.4)
GLOBULIN UR ELPH-MCNC: 3.3 GM/DL
GLUCOSE SERPL-MCNC: 75 MG/DL (ref 65–99)
HCT VFR BLD AUTO: 41.6 % (ref 34–46.6)
HGB BLD-MCNC: 13.4 G/DL (ref 12–15.9)
IMM GRANULOCYTES # BLD AUTO: 0.03 10*3/MM3 (ref 0–0.05)
IMM GRANULOCYTES NFR BLD AUTO: 0.4 % (ref 0–0.5)
LYMPHOCYTES # BLD AUTO: 1.9 10*3/MM3 (ref 0.7–3.1)
LYMPHOCYTES NFR BLD AUTO: 22.4 % (ref 19.6–45.3)
MCH RBC QN AUTO: 29.9 PG (ref 26.6–33)
MCHC RBC AUTO-ENTMCNC: 32.2 G/DL (ref 31.5–35.7)
MCV RBC AUTO: 92.9 FL (ref 79–97)
MONOCYTES # BLD AUTO: 0.76 10*3/MM3 (ref 0.1–0.9)
MONOCYTES NFR BLD AUTO: 9 % (ref 5–12)
NEUTROPHILS NFR BLD AUTO: 5.44 10*3/MM3 (ref 1.7–7)
NEUTROPHILS NFR BLD AUTO: 64 % (ref 42.7–76)
NRBC BLD AUTO-RTO: 0 /100 WBC (ref 0–0.2)
PLATELET # BLD AUTO: 292 10*3/MM3 (ref 140–450)
PMV BLD AUTO: 9.8 FL (ref 6–12)
POTASSIUM SERPL-SCNC: 4 MMOL/L (ref 3.5–5.2)
PROT SERPL-MCNC: 7.8 G/DL (ref 6–8.5)
RBC # BLD AUTO: 4.48 10*6/MM3 (ref 3.77–5.28)
SODIUM SERPL-SCNC: 142 MMOL/L (ref 136–145)
TSH SERPL DL<=0.05 MIU/L-ACNC: 4.02 UIU/ML (ref 0.27–4.2)
WBC NRBC COR # BLD AUTO: 8.49 10*3/MM3 (ref 3.4–10.8)

## 2024-05-31 PROCEDURE — 85025 COMPLETE CBC W/AUTO DIFF WBC: CPT

## 2024-05-31 PROCEDURE — 84443 ASSAY THYROID STIM HORMONE: CPT

## 2024-05-31 PROCEDURE — 36415 COLL VENOUS BLD VENIPUNCTURE: CPT

## 2024-05-31 PROCEDURE — 80053 COMPREHEN METABOLIC PANEL: CPT

## 2024-05-31 NOTE — ED NOTES
Thank you for letting us care for you in your recent visit to our urgent care center. We would love to hear about your experience with us. Was this the first time you have visited our location?    We’re always looking for ways to make our patients’ experiences even better. Do you have any recommendations on ways we may improve?     I appreciate you taking the time to respond. Please be on the lookout for a survey about your recent visit from [employer] via text or email. We would greatly appreciate if you could fill that out and turn it back in. We want your voice to be heard and we value your feedback.   Thank you for choosing [facility] for your healthcare needs.
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
## 2024-05-31 NOTE — PROGRESS NOTES
Physical Therapy Daily Note  [address] [place] [address]    VISIT#: 30    Subjective   Monika Gu reports she did not feel well the next day after therapy. Pt reports she isn't sure if it was therapy or just a coincidence. Pt reports she had a hard time with word recall and was very lethargic.       Objective     See Exercise, Manual, and Modality Logs for complete treatment.     Assessment/Plan    Continued with progressing patient's vestibular exercises and incorporating sound today. Pt became symptomatic with the clinic having increase in sound. Pt also became symptomatic with exercises that increased sound such as bouncing ball. Pt did fell better today compared to last session, but did still become nauseous at end of session. Will continue to progress patient as able.     Progress per Plan of Care and Progress strengthening /stabilization /functional activity            Timed:                 Manual Therapy:    0     mins  26198;     Therapeutic Exercise:    0     mins  59279;     Neuromuscular Go:    30    mins  83630;    Therapeutic Activity:     10     mins  27707;     Gait Trainin     mins  67262;     Ultrasound:     0     mins  59587;    Ionto                               0    mins   21104  Self pay                         0     mins [email or web address]    Un-Timed:  Electrical Stimulation:    0     mins  83827 ( )  Canalith Repos    0     mins 41303  Dry Needling     0     mins self-pay  Traction     0     mins 46310    Timed Treatment:   40   mins   Total Treatment:     40   mins    Adan Portillo PT  Physical Therapist    PT SIGNATURE: Electronically signed by Adan Portillo PT  [place] LICENSE: [id number]

## 2024-05-31 NOTE — TELEPHONE ENCOUNTER
Faxed referral over to [facility] to see if they could see patient sooner than [place]. I called [facility], they are not accepting any new referrals at the moment.

## 2024-05-31 NOTE — PROGRESS NOTES
AUDIOMETRIC EVALUATION      Name:  Monika Gu  :  1980  Age:  43 y.o.  Date of Evaluation:  2024       History:   is seen today for follow-up hearing evaluation.    Audiologic Information:  Concerns for Hearing: Yes  PETs: No  Other otologic surgical history: No  Aural Pressure/Fullness: No  Otalgia: None  Otorrhea: No  Tinnitus: Yes  Dizziness: Yes  Noise Exposure: No  Family history of hearing loss: No  Head trauma requiring hospital stay: Yes  Chemotherapy: No  Other significant history: None    EVALUATION:    See audiogram    RESULTS:    Otoscopic Evaluation:        NOTE: Testing completed after ears were examined by Dr. Diaz    Tympanometry (226 Hz):  Right: Type A  Left: Type A    IMPRESSIONS:  Pure tone thresholds for the right ear indicates hearing within normal limits.  Pure tone thresholds for the left ear indicates a mild conductive hearing loss.  Patient was counseled with regard to the findings.    RECOMMENDATIONS/PLAN:  Follow-up recommendations of Dr. Diaz  Discussed results and recommendations with patient.        Jasbir Markham M.S, [facility]  Licensed Audiologist

## 2024-05-31 NOTE — PROGRESS NOTES
I spoke with the patient.  Lab work is okay.  Rash is a same no change.  But he still same no change.  All other symptoms same.  I told her it is hard to decipher exactly what is this rash is from.  She did have the injection bicipital neuralgia right at 2 weeks ago.  Sometimes you can get drug rashes up to 2 weeks its possibility.  I will send her some triamcinolone cream.  I told her this does not resolve though but only.  This out is active with a dermatologist do a small punch biopsy.  She voiced understanding.

## 2024-06-04 ENCOUNTER — TELEPHONE (OUTPATIENT)
Dept: FAMILY MEDICINE CLINIC | Facility: CLINIC | Age: 44
End: 2024-06-04
Payer: COMMERCIAL

## 2024-06-04 NOTE — TELEPHONE ENCOUNTER
Caller: ERIC DORANTES    Relationship: NURSE  WITH [employer]    Best call back number: [phone]     What form or medical record are you requesting: CURRENT MEDICATION LIST AND PLAN OF TREATMENT    How would you like to receive the form or medical records (pick-up, mail, fax): FAX TO: [phone]

## 2024-06-04 NOTE — TELEPHONE ENCOUNTER
Spoke with patient and informed her that we have her WC on file. I advised her to call the billing dept.

## 2024-06-04 NOTE — TELEPHONE ENCOUNTER
SPOKE WITH  ERIC STATES SHE JUST NEEDS PT ACTIVE MED LIST AND INITIAL OV OF WHAT PCP TREATS PT FOR NOT RELATED TO [employer] FAXED INFO OVER TO NUMBER PROVIDED

## 2024-06-04 NOTE — TELEPHONE ENCOUNTER
CALLED LM FOR ERIC TO CALL BACK.  ALSO CALLED PT WHO STATES THAT INSURANCE IS BEING BILLED FOR JUST ABOUT ALL HERE SPECIALITY VISITS INCLUDING THE REFERRALS FOR WORK INJURY. PT STATES  SHOULD HAVE BEEN BILLED BY [employer] AND NOT [employer].

## 2024-06-04 NOTE — TELEPHONE ENCOUNTER
PT WAS SEEN BY ENT ON 05/31/24 PT IS BEING REFERRED TO A SPECIALIST BY ENT PT IS UNSURE OF WHAT SHE SHOULD DO NEXT AS FAR AS A  2ND OPINION GOES FOR NEURO. AS ENT STATED TO  PT We discussed my concern that her symptoms may be related to the left-sided superior semicircular canal dehiscence given her autophony, imbalance, and conductive hearing loss. PLEASE ADVISE IF YOU WANT HER TO FOLLOW UP WITH YOU

## 2024-06-04 NOTE — TELEPHONE ENCOUNTER
I would defer to judgement of ENT on the plan.       This document has been electronically signed by Tyrone Hagan MD on June 4, 2024 14:02 EDT

## 2024-06-06 ENCOUNTER — TREATMENT (OUTPATIENT)
Dept: PHYSICAL THERAPY | Facility: CLINIC | Age: 44
End: 2024-06-06
Payer: OTHER MISCELLANEOUS

## 2024-06-06 DIAGNOSIS — F07.81 POST CONCUSSIVE SYNDROME: ICD-10-CM

## 2024-06-06 DIAGNOSIS — R26.89 BALANCE DISORDER: Primary | ICD-10-CM

## 2024-06-06 NOTE — PROGRESS NOTES
Physical Therapy Daily Note  [address] [place] [address]    VISIT#: 31    Subjective   Monika Gu reports she is having a bad day today. Pt reports on Tuesday she started feeling bad. Pt reports she saw audiology last week and was told she has semicircular canal dehiscence. Pt reports she is being sent to a specialist and has an appointment in August. Pt reports MD said to continue with therapy.        Objective     See Exercise, Manual, and Modality Logs for complete treatment.     Assessment/Plan    Continued to progress patient with vestibular exercises and patient tolerated, but was more symptomatic with exercises today and required some sitting rest breaks in between reps to recover. Will continue to progress patient as able.       Progress per Plan of Care and Progress strengthening /stabilization /functional activity            Timed:                 Manual Therapy:    0     mins  08951;     Therapeutic Exercise:    0     mins  00584;     Neuromuscular Go:    23    mins  22204;    Therapeutic Activity:     8     mins  52605;     Gait Trainin     mins  38192;     Ultrasound:     0     mins  79587;    Ionto                               0    mins   50195  Self pay                         0     mins [email or web address]    Un-Timed:  Electrical Stimulation:    0     mins  79063 ( )  Canalith Repos    0     mins 07188  Dry Needling     0     mins self-pay  Traction     0     mins 23134    Timed Treatment:   31   mins   Total Treatment:     31   mins    Adan Portillo PT  Physical Therapist    PT SIGNATURE: Electronically signed by Adan Portillo PT  [place] LICENSE: [id number]

## 2024-06-11 ENCOUNTER — TREATMENT (OUTPATIENT)
Dept: PHYSICAL THERAPY | Facility: CLINIC | Age: 44
End: 2024-06-11
Payer: OTHER MISCELLANEOUS

## 2024-06-11 DIAGNOSIS — F07.81 POST CONCUSSIVE SYNDROME: ICD-10-CM

## 2024-06-11 DIAGNOSIS — R26.89 BALANCE DISORDER: Primary | ICD-10-CM

## 2024-06-11 NOTE — PROGRESS NOTES
Physical Therapy Daily Note  [address] [place] [address]    VISIT#: 32    Subjective   Monika Gu reports she is not feeling the best today.      Objective     See Exercise, Manual, and Modality Logs for complete treatment.     Assessment/Plan    Used headphone in L ear today due to clinic being loud, but by end of session patient required both headphones due to patient becoming very nauseous and not feeling well in general. Pt had increase in difficulty with balancing on BOSU today compared to previous sessions of using BOSU. Pt's appointment was earlier in the morning today which could be contributing to a decrease in balance and becoming more symptomatic. Will continue to progress patient as able.     Progress per Plan of Care and Progress strengthening /stabilization /functional activity            Timed:                 Manual Therapy:    0     mins  29083;     Therapeutic Exercise:    0     mins  38247;     Neuromuscular Go:    15    mins  71426;    Therapeutic Activity:     23     mins  96373;     Gait Trainin     mins  10884;     Ultrasound:     0     mins  96547;    Ionto                               0    mins   25272  Self pay                         0     mins [email or web address]    Un-Timed:  Electrical Stimulation:    0     mins  86441 ( )  Canalith Repos    0     mins 56125  Dry Needling     0     mins self-pay  Traction     0     mins 02654    Timed Treatment:   38   mins   Total Treatment:     38   mins    Adan Portillo PT  Physical Therapist    PT SIGNATURE: Electronically signed by Adan Portillo PT  [place] LICENSE: [id number]

## 2024-06-14 ENCOUNTER — TREATMENT (OUTPATIENT)
Dept: PHYSICAL THERAPY | Facility: CLINIC | Age: 44
End: 2024-06-14
Payer: OTHER MISCELLANEOUS

## 2024-06-14 ENCOUNTER — OFFICE VISIT (OUTPATIENT)
Dept: FAMILY MEDICINE CLINIC | Facility: CLINIC | Age: 44
End: 2024-06-14
Payer: COMMERCIAL

## 2024-06-14 ENCOUNTER — LAB (OUTPATIENT)
Dept: LAB | Facility: HOSPITAL | Age: 44
End: 2024-06-14
Payer: COMMERCIAL

## 2024-06-14 VITALS
SYSTOLIC BLOOD PRESSURE: 121 MMHG | DIASTOLIC BLOOD PRESSURE: 83 MMHG | BODY MASS INDEX: 23.92 KG/M2 | WEIGHT: 130 LBS | HEART RATE: 74 BPM | HEIGHT: 62 IN | OXYGEN SATURATION: 100 %

## 2024-06-14 DIAGNOSIS — R23.3 PETECHIAL RASH: Primary | ICD-10-CM

## 2024-06-14 DIAGNOSIS — F07.81 POST CONCUSSIVE SYNDROME: ICD-10-CM

## 2024-06-14 DIAGNOSIS — R23.3 PETECHIAL RASH: ICD-10-CM

## 2024-06-14 DIAGNOSIS — R26.89 BALANCE DISORDER: Primary | ICD-10-CM

## 2024-06-14 LAB
APTT PPP: 32.5 SECONDS (ref 24.2–34.2)
BASOPHILS # BLD AUTO: 0.06 10*3/MM3 (ref 0–0.2)
BASOPHILS NFR BLD AUTO: 1 % (ref 0–1.5)
DEPRECATED RDW RBC AUTO: 42.2 FL (ref 37–54)
EOSINOPHIL # BLD AUTO: 0.17 10*3/MM3 (ref 0–0.4)
EOSINOPHIL NFR BLD AUTO: 2.7 % (ref 0.3–6.2)
ERYTHROCYTE [DISTWIDTH] IN BLOOD BY AUTOMATED COUNT: 12.8 % (ref 12.3–15.4)
HCT VFR BLD AUTO: 41.5 % (ref 34–46.6)
HGB BLD-MCNC: 13.6 G/DL (ref 12–15.9)
IMM GRANULOCYTES # BLD AUTO: 0.02 10*3/MM3 (ref 0–0.05)
IMM GRANULOCYTES NFR BLD AUTO: 0.3 % (ref 0–0.5)
INR PPP: 0.98 (ref 0.86–1.15)
LYMPHOCYTES # BLD AUTO: 1.75 10*3/MM3 (ref 0.7–3.1)
LYMPHOCYTES NFR BLD AUTO: 28 % (ref 19.6–45.3)
MCH RBC QN AUTO: 29.6 PG (ref 26.6–33)
MCHC RBC AUTO-ENTMCNC: 32.8 G/DL (ref 31.5–35.7)
MCV RBC AUTO: 90.4 FL (ref 79–97)
MONOCYTES # BLD AUTO: 0.63 10*3/MM3 (ref 0.1–0.9)
MONOCYTES NFR BLD AUTO: 10.1 % (ref 5–12)
NEUTROPHILS NFR BLD AUTO: 3.62 10*3/MM3 (ref 1.7–7)
NEUTROPHILS NFR BLD AUTO: 57.9 % (ref 42.7–76)
NRBC BLD AUTO-RTO: 0 /100 WBC (ref 0–0.2)
PLATELET # BLD AUTO: 278 10*3/MM3 (ref 140–450)
PMV BLD AUTO: 10.1 FL (ref 6–12)
PROTHROMBIN TIME: 13.2 SECONDS (ref 11.8–14.9)
RBC # BLD AUTO: 4.59 10*6/MM3 (ref 3.77–5.28)
WBC NRBC COR # BLD AUTO: 6.25 10*3/MM3 (ref 3.4–10.8)

## 2024-06-14 PROCEDURE — 99213 OFFICE O/P EST LOW 20 MIN: CPT | Performed by: STUDENT IN AN ORGANIZED HEALTH CARE EDUCATION/TRAINING PROGRAM

## 2024-06-14 PROCEDURE — 85730 THROMBOPLASTIN TIME PARTIAL: CPT

## 2024-06-14 PROCEDURE — 85025 COMPLETE CBC W/AUTO DIFF WBC: CPT

## 2024-06-14 PROCEDURE — 36415 COLL VENOUS BLD VENIPUNCTURE: CPT

## 2024-06-14 PROCEDURE — 85610 PROTHROMBIN TIME: CPT

## 2024-06-14 NOTE — PROGRESS NOTES
Physical Therapy Daily Note  [address] [place] [address]    VISIT#: 33    Subjective   Monika Gu reports after last therapy session she went to [facility] where she started to have double vision. Pt reports she had to go home and slept a lot of the day. Pt reports she is feeling better today.       Objective     See Exercise, Manual, and Modality Logs for complete treatment.     Assessment/Plan    Focused primarily on visual training today and patient had increase in difficulty with an increase in sound and also increase in difficulty with skipping number tasks. Pt did become very nauseous by the end of treatment session. Will continue to progress patient as able.     Progress per Plan of Care and Progress strengthening /stabilization /functional activity            Timed:                 Manual Therapy:    0     mins  83546;     Therapeutic Exercise:    0     mins  16998;     Neuromuscular Go:    23    mins  71805;    Therapeutic Activity:     8     mins  65636;     Gait Trainin     mins  96625;     Ultrasound:     0     mins  29665;    Ionto                               0    mins   71360  Self pay                         0     mins [email or web address]    Un-Timed:  Electrical Stimulation:    0     mins  95241 ( )  Canalith Repos    0     mins 53071  Dry Needling     0     mins self-pay  Traction     0     mins 58171    Timed Treatment:   31   mins   Total Treatment:     31   mins    Adan Portillo PT  Physical Therapist    PT SIGNATURE: Electronically signed by Adan Portillo PT  [place] LICENSE: [id number]

## 2024-06-14 NOTE — PROGRESS NOTES
Subjective:       Monika Gu is a 43 y.o. female who presents to discuss petechial rash.    Ms. Gu reports a petechial rash on her thighs of approximately 2 weeks duration. She received a lidocaine treatment recently and discovered she had allergy to it.  She subsequently had a petechial rash break out on her skin.  This prompted her to seek treatment from urgent care.  Unfortunately, for some reason I am not able to access that note.  Ms. Grider reports she was told that this was not an allergic reaction and she was prescribed topical Kenalog.  However, she then reported allergy to Kenalog.  She subsequently has been using topical hydrocortisone without relief.    Physical exam consistent with a petechial rash.  I do not see medications on her list that are associated with petechial rash such as NSAID or aspirin.    Although she is less likely to have benefit with another topical steroid, we could consider systemic steroids to reduce inflammation.  She does decline that option today due to potential side effects.    I would obtain CBC to evaluate hemoglobin and platelets, I would also obtain screening test of coagulation that include PT, PTT and INR. If she were found to have a bleeding disorder, would refer to hematology.    I initially planned to refer her to dermatology and was considering steroids but since this is a petechial rash, I am not sure she would have much benefit with this.  At this point, I would recommend watchful waiting instead unless she has worsening signs or symptoms.  If rash is failed to improve 1 month from today, then I would consider specialist referral at that point but I do think more time is warranted.    The following portions of the patient's history were reviewed and updated as appropriate: allergies, current medications, past family history, past medical history, past social history, past surgical history, and problem list.    Past Medical Hx:  Past Medical History:    Diagnosis Date    Abnormal ECG 2/2/2024    Sinus tachycardia    Allergic     Allergic rhinitis 2002    Seasonal allergy symptoms all seasons    Arrhythmia     Arthritis     Bilateral occipital neuralgia 5/7/2024    Cancer 3/19    Adenocarcinoma in situ    Concussion 10/2023    L FACIAL INJURY foot    Difficulty walking 10/25    Dizziness October 26, 2023    Concussion    Endometriosis     Fracture of nasal bones 1996    Left side of bridge of nose    GERD (gastroesophageal reflux disease)     Headache     Headache, tension-type 10/25    History of medical problems     Hyperthyroidism     Memory loss 10/25    Migraine 10/25    Nosebleed 2002    Dry weather and seasonal changes    BALBINA (obstructive sleep apnea) 5/22/2024    Shingles     Syncope 10/25    Tinnitus October 26, 2023    Concussion       Past Surgical Hx:  Past Surgical History:   Procedure Laterality Date    APPENDECTOMY      HYSTERECTOMY      OCCIPITAL NERVE BLOCK      OOPHORECTOMY  2019    ADENOCARCINOMA    TONSILLECTOMY         Current Meds:    Current Outpatient Medications:     Azelastine HCl 137 MCG/SPRAY solution, by Each Nare route 2 (Two) Times a Day As Needed. USE 2 SPRAYS PER NOSTRIL TWICE A DAY AS NEEDED, Disp: , Rfl:     dexlansoprazole (DEXILANT) 60 MG capsule, Take 1 capsule by mouth Daily., Disp: 90 capsule, Rfl: 1    dicyclomine (BENTYL) 10 MG capsule, Take 1 capsule by mouth 4 (Four) Times a Day Before Meals & at Bedtime. (Patient taking differently: Take 1 capsule by mouth 4 (Four) Times a Day Before Meals & at Bedtime. prn), Disp: 16 capsule, Rfl: 0    estradiol (ESTRACE) 0.1 MG/GM vaginal cream, Insert 2 g into the vagina Daily., Disp: , Rfl:     levothyroxine (SYNTHROID, LEVOTHROID) 50 MCG tablet, Mon, Tues, Thurs, Friday, and Sun, Disp: 24 tablet, Rfl: 6    levothyroxine (SYNTHROID, LEVOTHROID) 75 MCG tablet, Take 1 tablet by mouth Take As Directed. Take one tablet Wed and Sat, Disp: 8 tablet, Rfl: 6    metoprolol succinate XL  "(TOPROL-XL) 25 MG 24 hr tablet, Take 1 tablet by mouth Daily., Disp: 90 tablet, Rfl: 3    SUMAtriptan (IMITREX) 25 MG tablet, Take 1 tablet by mouth 1 (One) Time., Disp: , Rfl:     vitamin D3 125 MCG (5000 UT) capsule capsule, Take 1 capsule by mouth Daily., Disp: , Rfl:     Allergies:  Allergies   Allergen Reactions    Lidocaine Hives, Nausea Only and Rash     Received occipital nerve block with lidocaine 5/15/24. Noticed rash the following day 5/16/24. More likely delayed hypersensitivity/contact dermatitis based on reaction, timing, and characteristics of lidocaine.    Amitriptyline Palpitations    Kenalog [Triamcinolone] Rash       Family Hx:  Family History   Problem Relation Age of Onset    Hypertension Mother     Thyroid disease Mother     Migraines Mother         Whole family    Anemia Mother     Sleep apnea Mother     Diabetes Father     Migraines Father         Whole family    Heart disease Father     Diabetes Brother     Cancer Maternal Aunt     Cancer Paternal Grandmother     Colon cancer Neg Hx         Social History:  Social History     Socioeconomic History    Marital status: Single   Tobacco Use    Smoking status: Never     Passive exposure: Past    Smokeless tobacco: Never   Vaping Use    Vaping status: Never Used   Substance and Sexual Activity    Alcohol use: Not Currently    Drug use: Never    Sexual activity: Not Currently     Partners: Male       Review of Systems  Review of Systems   Skin:  Positive for rash.       Objective:     /83   Pulse 74   Ht 157.5 cm (62\")   Wt 59 kg (130 lb)   SpO2 100%   BMI 23.78 kg/m²   Physical Exam  Constitutional:       General: She is not in acute distress.     Appearance: Normal appearance. She is normal weight. She is not ill-appearing, toxic-appearing or diaphoretic.   Pulmonary:      Effort: Pulmonary effort is normal. No respiratory distress.   Skin:     Findings: Rash present.   Neurological:      Mental Status: She is alert.   Psychiatric:    "      Mood and Affect: Mood normal.         Behavior: Behavior normal.          Assessment/Plan:     Diagnoses and all orders for this visit:    1. Petechial rash (Primary)    Ms. Gu reports a petechial rash on her thighs of approximately 2 weeks duration. She received a lidocaine treatment recently and discovered she had allergy to it.  She subsequently had a petechial rash break out on her skin.  This prompted her to seek treatment from urgent care. Unfortunately, for some reason I am not able to access that note.  Ms. Grider reports she was told that this was not an allergic reaction and she was prescribed topical Kenalog.  However, she then reported allergy to Kenalog.  She subsequently has been using topical hydrocortisone without relief.    Physical exam consistent with a petechial rash.  I do not see medications on her list that are associated with petechial rash such as NSAID or aspirin.    Although she is less likely to have benefit with another topical steroid, we could consider systemic steroids to reduce inflammation.  She does decline that option today due to potential side effects.    I would obtain CBC to evaluate hemoglobin and platelets, I would also obtain screening test of coagulation that include PT, PTT and INR. If she were found to have a bleeding disorder, would refer to hematology.    I initially planned to refer her to dermatology and was considering steroids but since this is a petechial rash, I am not sure she would have much benefit with this.  At this point, I would recommend watchful waiting instead unless she has worsening signs or symptoms.  If rash is failed to improve 1 month from today, then I would consider specialist referral at that point but I do think more time is warranted.      -     CBC w AUTO Differential; Future  -     Protime-INR; Future  -     APTT; Future          Rx changes: None     Follow-up:     Return if symptoms worsen or fail to improve, for Next scheduled  follow up.    Preventative:  Health Maintenance   Topic Date Due    COVID-19 Vaccine (3 - 2023-24 season) 09/01/2023    HEPATITIS C SCREENING  Never done    ANNUAL PHYSICAL  Never done    INFLUENZA VACCINE  08/01/2024    MAMMOGRAM  08/17/2025    TDAP/TD VACCINES (3 - Tdap) 09/29/2033    Pneumococcal Vaccine 0-64  Aged Out         This document has been electronically signed by Tyrone Hagan MD on June 14, 2024 11:38 EDT       Parts of this note are electronic transcriptions/translations of spoken language to printed text using the Dragon Dictation system.

## 2024-06-14 NOTE — PROGRESS NOTES
PT, INR, PTT all normal.  We will wait to contact her until all results including CBC have returned.    ?  This document has been electronically signed by Tyrone Hagan MD on June 14, 2024 14:27 EDT

## 2024-06-17 NOTE — PROGRESS NOTES
CBC, PT, PTT, INR all normal.    Thank you,    Tyrone Hagan    ?  This document has been electronically signed by Tyrone Hagan MD on June 17, 2024 08:06 EDT

## 2024-06-18 ENCOUNTER — TREATMENT (OUTPATIENT)
Dept: PHYSICAL THERAPY | Facility: CLINIC | Age: 44
End: 2024-06-18
Payer: OTHER MISCELLANEOUS

## 2024-06-18 DIAGNOSIS — R26.89 BALANCE DISORDER: Primary | ICD-10-CM

## 2024-06-18 DIAGNOSIS — F07.81 POST CONCUSSIVE SYNDROME: ICD-10-CM

## 2024-06-18 NOTE — PROGRESS NOTES
[facility]  Cardiology progress Note    Patient Name: Monika Gu  : 1980    CHIEF COMPLAINT  Palpitations        Subjective   Subjective     HISTORY OF PRESENT ILLNESS    Monika Gu is a 43 y.o. female with history of palpitations.  Her palpitations are worse at nighttime.    REVIEW OF SYSTEMS    Constitutional:    No fever, no weight loss  Skin:     No rash  Otolaryngeal:    No difficulty swallowing  Cardiovascular: See HPI.  Pulmonary:    No cough, no sputum production    Personal History     Social History:    reports that she has never smoked. She has been exposed to tobacco smoke. She has never used smokeless tobacco. She reports that she does not currently use alcohol. She reports that she does not use drugs.    Home Medications:  Current Outpatient Medications on File Prior to Visit   Medication Sig    Azelastine HCl 137 MCG/SPRAY solution by Each Nare route 2 (Two) Times a Day As Needed. USE 2 SPRAYS PER NOSTRIL TWICE A DAY AS NEEDED    dexlansoprazole (DEXILANT) 60 MG capsule Take 1 capsule by mouth Daily.    dicyclomine (BENTYL) 10 MG capsule Take 1 capsule by mouth 4 (Four) Times a Day Before Meals & at Bedtime. (Patient taking differently: Take 1 capsule by mouth 4 (Four) Times a Day Before Meals & at Bedtime. prn)    estradiol (ESTRACE) 0.1 MG/GM vaginal cream Insert 2 g into the vagina Daily.    levothyroxine (SYNTHROID, LEVOTHROID) 50 MCG tablet Mon, Tues, Thurs, Friday, and Sun    levothyroxine (SYNTHROID, LEVOTHROID) 75 MCG tablet Take 1 tablet by mouth Take As Directed. Take one tablet Wed and Sat    metoprolol succinate XL (TOPROL-XL) 25 MG 24 hr tablet Take 1 tablet by mouth Daily.    SUMAtriptan (IMITREX) 25 MG tablet Take 1 tablet by mouth 1 (One) Time.    vitamin D3 125 MCG (5000 UT) capsule capsule Take 1 capsule by mouth Daily.     No current facility-administered medications on file prior to visit.       Past Medical History:   Diagnosis Date    Abnormal ECG  2/2/2024    Sinus tachycardia    Allergic     Allergic rhinitis 2002    Seasonal allergy symptoms all seasons    Arrhythmia     Arthritis     Bilateral occipital neuralgia 5/7/2024    Cancer 3/19    Adenocarcinoma in situ    Concussion 10/2023    L FACIAL INJURY foot    Difficulty walking 10/25    Dizziness October 26, 2023    Concussion    Endometriosis     Fracture of nasal bones 1996    Left side of bridge of nose    GERD (gastroesophageal reflux disease)     Headache     Headache, tension-type 10/25    History of medical problems     Hyperthyroidism     Memory loss 10/25    Migraine 10/25    Nosebleed 2002    Dry weather and seasonal changes    BALBINA (obstructive sleep apnea) 5/22/2024    Shingles     Syncope 10/25    Tinnitus October 26, 2023    Concussion       Allergies:  Allergies   Allergen Reactions    Lidocaine Hives, Nausea Only and Rash     Received occipital nerve block with lidocaine 5/15/24. Noticed rash the following day 5/16/24. More likely delayed hypersensitivity/contact dermatitis based on reaction, timing, and characteristics of lidocaine.    Amitriptyline Palpitations    Kenalog [Triamcinolone] Rash       Objective    Objective       Vitals:   Heart Rate:  [60] 60  BP: (103)/(62) 103/62  Body mass index is 23.41 kg/m².     PHYSICAL EXAM:    General Appearance:   well developed  well nourished  HENT:   oropharynx moist  lips not cyanotic  Neck:  thyroid not enlarged  supple  Respiratory:  no respiratory distress  normal breath sounds  no rales  Cardiovascular:  no jugular venous distention  regular rhythm  apical impulse normal  S1 normal, S2 normal  no S3, no S4   no murmur  no rub, no thrill  carotid pulses normal; no bruit  pedal pulses normal  lower extremity edema: none          Result Review:  I have personally reviewed the available results from  [x]  Laboratory  [x]  EKG  [x]  Cardiology  [x]  Medications  [x]  Old records  []  Other:     Procedures    Results for orders placed during the  hospital encounter of 05/01/24    Adult Transthoracic Echo Complete W/ Cont if Necessary Per Protocol    Interpretation Summary  Normal left ventricular systolic function.  No significant valve abnormalities noted.     Impression/Plan:  1.  Palpitations/sinus tachycardia: Increase Toprol-XL to 37.5 mg once a day.  Echocardiogram within normal limits.        Austin Meredith MD   06/21/24   09:10 EDT

## 2024-06-18 NOTE — PROGRESS NOTES
Progress Assessment  [address] [place] [id number]        Patient: Monika Gu   : 1980  Diagnosis/ICD-10 Code:  Balance disorder [R26.89]  Referring practitioner: Theodore Loyd MD  Date of Initial Visit: Type: THERAPY  Noted: 2024  Today's Date: 2024  Patient seen for 34 sessions      Subjective:     Subjective Questionnaire: Optimal: 46/110  Clinical Progress: worse  Home Program Compliance: Yes  Treatment has included: therapeutic exercise, neuromuscular re-education, manual therapy, therapeutic activity, and gait training    Subjective     Pt reports she feels like she is more symptomatic now compared to a month ago. Pt in the last month has attempted to return back to work and had occipital injections. Both of which increased her symptoms. Although, occipital injections was a temporary increase in symptoms. 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     Objective      Tandem L in front with EC: 3 seconds   Tandem R front with EC: 6 seconds  Didn't perform SLS stance today secondary to patient being very nauseous from FGA testing.      FGA:      Assessment/Plan    Overall, patient has made a decline in progress since last progress note. Pt has had a decrease in FGA and balance testing secondary to becoming more symptomatic. Pt is also having to wear her headphones in the clinic for part of session secondary to becoming overstimulated with loud sounds from the clinic and becoming very nauseous. Pt overall continues to have made significant progress since starting PT, but has had a small setback this month due to attempting to return back to work, stress from determining plan due to semicircular canal dehiscence, and occipital injections that temporarily increased her symptoms. Pt requires skilled PT in order to address deficits listed above to return patient back to maximum function to include  work. Will continue to progress patient as able.      Goals  Plan Goals: 1. Pt has difficulty ambulating in environment.     1. LTG 1: 12 weeks: Pt will be able to ambulate in store for 30 minutes with minimal to no signs and symptoms of nausea.     STATUS: IN PROGRESS     STG 1: 6 weeks: Pt will be able to ambulate throughout clinic with minimal to no signs and symptoms of nausea for a total of 15 minutes.      STATUS:  IN PROGRESS - patient can only perform for 5 minutes at this time.     2. The patient has gait dysfunction.     LTG 2: 12 weeks:  The patient will demonstrate > 29 / 30 on the Functional Gait Assessment for improved functional mobility.      STATUS:  IN PROGRESS     STG 2: The patient will demonstrate > 25 / 30 on the Functional Gait Assessment for improved functional mobility.      STATUS:  IN PROGRESS     3. The patient has difficulty with balance.      LTG 3: 12 weeks: The patient will hold the sharpened romberg position with eyes closed for 20 seconds and SLS with EC for 10 seconds in order to improve balance and decrease risk of falling.                             STATUS: IN PROGRESS     STG 3: 6 weeks: The patient will hold the romberg position with eyes opened for 10 seconds in order to improve balance and decrease risk of falling.                             STATUS: IN PROGRESS     LTG 4: Pt will be able to perform all directions of visual tracking test with minimal to no signs and symptoms of nausea in order to allow for patient to scan hallways at work.      STATUS: MET    Progress toward previous goals: Partially Met    See Exercise, Manual, and Modality Logs for complete treatment.         Recommendations: Continue as planned  Timeframe: 2x a week for 2 months  Prognosis to achieve goals: good    PT SIGNATURE: Electronically signed by Adan Portillo PT  [place] LICENSE: [id number]    Based upon review of the patient's progress and continued therapy plan, it is my medical opinion that  Monika Gu should continue physical therapy treatment at [facility] PHYSICAL THERAPY  [address] [facility] [place] [address] [phone].      Timed:                 Manual Therapy:    0     mins  53664;     Therapeutic Exercise:    8     mins  61158;     Neuromuscular Go:    10    mins  68264;    Therapeutic Activity:     23     mins  84161;     Gait Trainin     mins  23519;     Ultrasound:     0     mins  96943;    Ionto                               0    mins   10386  Self pay                         0     mins [email or web address]    Un-Timed:  Electrical Stimulation:    0     mins  28005 ( )  Canalith Repos    0     mins 04063  Dry Needling     0     mins self-pay  Traction     0     mins 15460    Timed Treatment:   41   mins   Total Treatment:     41   mins      I certify that the therapy services are furnished while this patient is under my care.  The services outlined above are required by this patient, and will be reviewed every 90 days.

## 2024-06-21 ENCOUNTER — OFFICE VISIT (OUTPATIENT)
Dept: CARDIOLOGY | Facility: CLINIC | Age: 44
End: 2024-06-21
Payer: COMMERCIAL

## 2024-06-21 ENCOUNTER — TREATMENT (OUTPATIENT)
Dept: PHYSICAL THERAPY | Facility: CLINIC | Age: 44
End: 2024-06-21
Payer: OTHER MISCELLANEOUS

## 2024-06-21 VITALS
BODY MASS INDEX: 23.55 KG/M2 | SYSTOLIC BLOOD PRESSURE: 103 MMHG | HEART RATE: 60 BPM | DIASTOLIC BLOOD PRESSURE: 62 MMHG | HEIGHT: 62 IN | WEIGHT: 128 LBS

## 2024-06-21 DIAGNOSIS — R00.2 PALPITATIONS: Primary | ICD-10-CM

## 2024-06-21 DIAGNOSIS — F07.81 POST CONCUSSIVE SYNDROME: ICD-10-CM

## 2024-06-21 DIAGNOSIS — R26.89 BALANCE DISORDER: Primary | ICD-10-CM

## 2024-06-21 PROCEDURE — 99213 OFFICE O/P EST LOW 20 MIN: CPT | Performed by: SPECIALIST

## 2024-06-21 RX ORDER — METOPROLOL SUCCINATE 25 MG/1
37.5 TABLET, EXTENDED RELEASE ORAL DAILY
Qty: 90 TABLET | Refills: 3 | Status: SHIPPED | OUTPATIENT
Start: 2024-06-21

## 2024-06-21 NOTE — PROGRESS NOTES
Physical Therapy Daily Note  [address] [place] [address]    VISIT#: [id number]    Subjective   Monika Gu reports she had her appointment that was scheduled in August with a specialist moved to this afternoon.       Objective     See Exercise, Manual, and Modality Logs for complete treatment.     Assessment/Plan    Focused on BITS training today and patient tolerated, but continues to have increase in difficulty with patterns that require skipping in sequences. Pt also becomes very nauseous and hot with tasks that require multiple tasks such as cognitive and visual. Will continue to progress patient as able.     Progress per Plan of Care and Progress strengthening /stabilization /functional activity            Timed:                 Manual Therapy:    0     mins  14245;     Therapeutic Exercise:    0     mins  39453;     Neuromuscular Go:    30    mins  11918;    Therapeutic Activity:     0     mins  07911;     Gait Trainin     mins  14824;     Ultrasound:     0     mins  00859;    Ionto                               0    mins   03832  Self pay                         0     mins [email or web address]    Un-Timed:  Electrical Stimulation:    0     mins  66540 ( )  Canalith Repos    0     mins 05259  Dry Needling     0     mins self-pay  Traction     0     mins 94707    Timed Treatment:   30   mins   Total Treatment:     30   mins    Adan Portillo PT  Physical Therapist    PT SIGNATURE: Electronically signed by Adan Portillo PT  [place] LICENSE: [id number]

## 2024-06-24 ENCOUNTER — TREATMENT (OUTPATIENT)
Dept: PHYSICAL THERAPY | Facility: CLINIC | Age: 44
End: 2024-06-24
Payer: OTHER MISCELLANEOUS

## 2024-06-24 DIAGNOSIS — R26.89 BALANCE DISORDER: Primary | ICD-10-CM

## 2024-06-24 DIAGNOSIS — F07.81 POST CONCUSSIVE SYNDROME: ICD-10-CM

## 2024-06-24 PROCEDURE — 97530 THERAPEUTIC ACTIVITIES: CPT | Performed by: PHYSICAL THERAPIST

## 2024-06-24 PROCEDURE — 97112 NEUROMUSCULAR REEDUCATION: CPT | Performed by: PHYSICAL THERAPIST

## 2024-06-24 NOTE — PROGRESS NOTES
Physical Therapy Daily Note  [address] [place] [address]    VISIT#: [id number]    Subjective   Monika Gu reports she went to specialist last week and is being referred to neurosurgery and [facility] for more intensive therapy.       Objective     See Exercise, Manual, and Modality Logs for complete treatment.     Assessment/Plan    Continued with visual tracking exercises today and patient able to perform, but did decrease gait speed with ambulation with horizontal head turns. Gait speed improved with verbal cues on speed. Will continue to progress patient as able unless patient gets referral into [facility] before next appointment. If patient gets appointment with [facility], will discharge patient from PT.     Progress per Plan of Care and Progress strengthening /stabilization /functional activity            Timed:                 Manual Therapy:    0     mins  15061;     Therapeutic Exercise:    0     mins  36680;     Neuromuscular Go:    23    mins  38556;    Therapeutic Activity:     8     mins  19152;     Gait Trainin     mins  06425;     Ultrasound:     0     mins  54811;    Ionto                               0    mins   38457  Self pay                         0     mins [email or web address]    Un-Timed:  Electrical Stimulation:    0     mins  68896 ( )  Canalith Repos    0     mins 13274  Dry Needling     0     mins self-pay  Traction     0     mins 16846    Timed Treatment:   31   mins   Total Treatment:     31   mins    Adan Portillo PT  Physical Therapist    PT SIGNATURE: Electronically signed by Adan Portillo PT  [place] LICENSE: [id number]

## 2024-06-27 ENCOUNTER — TELEPHONE (OUTPATIENT)
Dept: OTOLARYNGOLOGY | Facility: CLINIC | Age: 44
End: 2024-06-27

## 2024-06-27 NOTE — TELEPHONE ENCOUNTER
[facility] received a fax that requires your attention. The document has been indexed to the patient’s chart for your review.      Reason for sending: NEEDS REVIEW    Documents Description: VISIT SUMMARY    Name of Sender: MICHELLE CALDERON    Date Indexed: 06/27/24

## 2024-07-02 ENCOUNTER — HOSPITAL ENCOUNTER (OUTPATIENT)
Dept: SLEEP MEDICINE | Facility: HOSPITAL | Age: 44
End: 2024-07-02
Payer: COMMERCIAL

## 2024-07-02 DIAGNOSIS — G47.33 OSA (OBSTRUCTIVE SLEEP APNEA): ICD-10-CM

## 2024-07-02 DIAGNOSIS — G25.81 RESTLESS LEGS SYNDROME (RLS): ICD-10-CM

## 2024-07-02 DIAGNOSIS — F51.04 PSYCHOPHYSIOLOGICAL INSOMNIA: ICD-10-CM

## 2024-07-02 DIAGNOSIS — F07.81 POST CONCUSSIVE SYNDROME: ICD-10-CM

## 2024-07-02 PROCEDURE — 95810 POLYSOM 6/> YRS 4/> PARAM: CPT

## 2024-07-11 PROCEDURE — 95810 POLYSOM 6/> YRS 4/> PARAM: CPT | Performed by: PSYCHIATRY & NEUROLOGY

## 2024-07-12 ENCOUNTER — OFFICE VISIT (OUTPATIENT)
Dept: SLEEP MEDICINE | Facility: HOSPITAL | Age: 44
End: 2024-07-12
Payer: COMMERCIAL

## 2024-07-12 VITALS
SYSTOLIC BLOOD PRESSURE: 107 MMHG | WEIGHT: 130.3 LBS | OXYGEN SATURATION: 98 % | HEIGHT: 62 IN | DIASTOLIC BLOOD PRESSURE: 68 MMHG | HEART RATE: 69 BPM | BODY MASS INDEX: 23.98 KG/M2

## 2024-07-12 DIAGNOSIS — F51.04 PSYCHOPHYSIOLOGICAL INSOMNIA: ICD-10-CM

## 2024-07-12 DIAGNOSIS — F07.81 POST CONCUSSIVE SYNDROME: Primary | ICD-10-CM

## 2024-07-12 DIAGNOSIS — G25.81 RESTLESS LEGS SYNDROME (RLS): ICD-10-CM

## 2024-07-12 PROCEDURE — 99214 OFFICE O/P EST MOD 30 MIN: CPT | Performed by: PSYCHIATRY & NEUROLOGY

## 2024-07-12 PROCEDURE — G0463 HOSPITAL OUTPT CLINIC VISIT: HCPCS | Performed by: PSYCHIATRY & NEUROLOGY

## 2024-07-12 RX ORDER — GABAPENTIN 100 MG/1
100 CAPSULE ORAL 3 TIMES DAILY
Qty: 90 CAPSULE | Refills: 1 | Status: SHIPPED | OUTPATIENT
Start: 2024-07-12
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
## 2024-07-15 ENCOUNTER — TREATMENT (OUTPATIENT)
Dept: PHYSICAL THERAPY | Facility: CLINIC | Age: 44
End: 2024-07-15
Payer: OTHER MISCELLANEOUS

## 2024-07-15 DIAGNOSIS — R26.89 BALANCE DISORDER: Primary | ICD-10-CM

## 2024-07-15 DIAGNOSIS — F07.81 POST CONCUSSIVE SYNDROME: ICD-10-CM

## 2024-07-15 NOTE — PROGRESS NOTES
Physical Therapy Daily Note  [address] [place] [address]    VISIT#: 37    Subjective   Monika Gu reports she has seen several specialists since last PT session. Pt reports she has several more appointments to prepare for upcoming surgery. Pt reports she also saw sleep specialist who performed a sleep study showing patient is only sleeping 50% of the time.       Objective     See Exercise, Manual, and Modality Logs for complete treatment.     Assessment/Plan    Continued with visual scanning exercises and patient tolerated well with earplugs in ears with no increase of symptoms. Pt did slow gait speed down with more crowded areas in clinic but able to complete tasks. Spent some of session updating with all of patient's appointments that she has had recently and discussion of upcoming surgery. Surgery has not been scheduled yet. Will be scheduled after patient has further imaging.     Progress per Plan of Care and Progress strengthening /stabilization /functional activity            Timed:                 Manual Therapy:    0     mins  98925;     Therapeutic Exercise:    23     mins  41455;     Neuromuscular Go:    0    mins  66333;    Therapeutic Activity:     23     mins  83645;     Gait Trainin     mins  56815;     Ultrasound:     0     mins  67971;    Ionto                               0    mins   30604  Self pay                         0     mins [email or web address]    Un-Timed:  Electrical Stimulation:    0     mins  92057 ( )  Canalith Repos    0     mins 37985  Dry Needling     0     mins self-pay  Traction     0     mins 45796    Timed Treatment:   46   mins   Total Treatment:     46   mins    Adan Portillo PT  Physical Therapist    PT SIGNATURE: Electronically signed by Adan Portillo PT  [place] LICENSE: [id number]

## 2024-07-17 ENCOUNTER — TREATMENT (OUTPATIENT)
Dept: PHYSICAL THERAPY | Facility: CLINIC | Age: 44
End: 2024-07-17
Payer: OTHER MISCELLANEOUS

## 2024-07-17 DIAGNOSIS — R26.89 BALANCE DISORDER: Primary | ICD-10-CM

## 2024-07-17 DIAGNOSIS — F07.81 POST CONCUSSIVE SYNDROME: ICD-10-CM

## 2024-07-17 NOTE — PROGRESS NOTES
Progress Assessment  [address] [place] [address]        Patient: Monika Gu   : 1980  Diagnosis/ICD-10 Code:  Balance disorder [R26.89]  Referring practitioner: No ref. provider found  Date of Initial Visit: Type: THERAPY  Noted: 2024  Today's Date: 2024  Patient seen for 38 sessions      Subjective:   Monika Gu reports: 5/10 in L ear today  Subjective Questionnaire: Optimal: 46/110  Clinical Progress: improved  Home Program Compliance: Yes  Treatment has included: therapeutic exercise, neuromuscular re-education, manual therapy, therapeutic activity, and gait training    Subjective     Pt reports she feels better since last progress note. Pt reports her mobility is 80% better. Pt reports the last 20% really impacts her function and quality of life. Pt reports she continues to have increase in difficulty in stores and places with lots of noise. Pt reports since wearing her headphones in therapy she doesn't get as sick after therapy.       Objective     EO feet together: 30 seconds  EC feet together: 30 seconds with significant increase in sway  Tandem L in front with EO: 8 seconds   Tandem R front with EO: 4 seconds  R SLS: 17 seconds  L SLS: 14 seconds      FGA:     Assessment/Plan      Pt has made progress with improving FGA score and balance testing compared to last progress note. Pt did wear headphones today throughout all of testing and still required rest breaks to recover from becoming symptomatic. Pt overall is making great progress, but continues to have limitations with loud noises, higher level balance exercises, and prolonged activity. Pt does have an upcoming surgery consisting of a craniotomy. Will continue with PT until patient has surgery to address deficits and ensure patient is strong going into surgery.     Goals  Plan Goals: 1. Pt has difficulty ambulating in environment.     1. LTG 1: 12 weeks: Pt will be able to ambulate in store for 30 minutes with  minimal to no signs and symptoms of nausea.     STATUS: IN PROGRESS     STG 1: 6 weeks: Pt will be able to ambulate throughout clinic with minimal to no signs and symptoms of nausea for a total of 15 minutes.      STATUS:  IN PROGRESS - patient can only perform for 5 minutes at this time.     2. The patient has gait dysfunction.     LTG 2: 12 weeks:  The patient will demonstrate > 29 / 30 on the Functional Gait Assessment for improved functional mobility.      STATUS:  IN PROGRESS     STG 2: The patient will demonstrate > 25 / 30 on the Functional Gait Assessment for improved functional mobility.      STATUS:  MET     3. The patient has difficulty with balance.      LTG 3: 12 weeks: The patient will hold the sharpened romberg position with eyes closed for 20 seconds and SLS with EC for 10 seconds in order to improve balance and decrease risk of falling.                             STATUS: IN PROGRESS     STG 3: 6 weeks: The patient will hold the romberg position with eyes opened for 10 seconds in order to improve balance and decrease risk of falling.                             STATUS: IN PROGRESS     LTG 4: Pt will be able to perform all directions of visual tracking test with minimal to no signs and symptoms of nausea in order to allow for patient to scan hallways at work.      STATUS: MET      Progress toward previous goals: Partially Met    See Exercise, Manual, and Modality Logs for complete treatment.         Recommendations: Continue as planned  Timeframe: 2x a week for 6 weeks  Prognosis to achieve goals: good    PT SIGNATURE: Electronically signed by Adan Portillo PT  [place] LICENSE: [id number]    Based upon review of the patient's progress and continued therapy plan, it is my medical opinion that Monika Gu should continue physical therapy treatment at [facility] PHYSICAL THERAPY  [address] [place] [address] [phone].      Timed:                 Manual  Therapy:    0     mins  34684;     Therapeutic Exercise:    0     mins  12726;     Neuromuscular Go:    15    mins  52441;    Therapeutic Activity:     30     mins  72241;     Gait Trainin     mins  91160;     Ultrasound:     0     mins  33324;    Ionto                               0    mins   63296  Self pay                         0     mins [email or web address]    Un-Timed:  Electrical Stimulation:    0     mins  93807 ( )  Canalith Repos    0     mins 40006  Dry Needling     0     mins self-pay  Traction     0     mins 32493    Timed Treatment:   45   mins   Total Treatment:     45   mins      I certify that the therapy services are furnished while this patient is under my care.  The services outlined above are required by this patient, and will be reviewed every 90 days.

## 2024-07-24 ENCOUNTER — TREATMENT (OUTPATIENT)
Dept: PHYSICAL THERAPY | Facility: CLINIC | Age: 44
End: 2024-07-24
Payer: OTHER MISCELLANEOUS

## 2024-07-24 DIAGNOSIS — F07.81 POST CONCUSSIVE SYNDROME: ICD-10-CM

## 2024-07-24 DIAGNOSIS — R26.89 BALANCE DISORDER: Primary | ICD-10-CM

## 2024-07-24 NOTE — PROGRESS NOTES
Physical Therapy Daily Note  [address] [place] [address]    VISIT#: 39    Subjective   Monika Gu reports she is having a good day. Pt reports she did go out to eat for the first time since concussion this past weekend and reports she became very symptomatic due to the noise and then spent all day recovering.      Objective     See Exercise, Manual, and Modality Logs for complete treatment.     Assessment/Plan    Continued with progressing patient's vestibular and balance exercises and patient able to complete, but did become symptomatic with ambulation with head turns and needed some time to recover. Pt continues to have increase in difficulty with tandem walking. Therefore performed static tandem with duel tasking and patient able to complete with slight LOB. Will continue to progress patient as able.     Progress per Plan of Care and Progress strengthening /stabilization /functional activity            Timed:                 Manual Therapy:    0     mins  32365;     Therapeutic Exercise:    0     mins  13232;     Neuromuscular Go:    23    mins  30279;    Therapeutic Activity:     23     mins  35438;     Gait Trainin     mins  55932;     Ultrasound:     0     mins  09897;    Ionto                               0    mins   82372  Self pay                         0     mins [email or web address]    Un-Timed:  Electrical Stimulation:    0     mins  38286 ( )  Canalith Repos    0     mins 25853  Dry Needling     0     mins self-pay  Traction     0     mins 52673    Timed Treatment:   46   mins   Total Treatment:     46   mins    Adan Portillo PT  Physical Therapist    PT SIGNATURE: Electronically signed by Adan Portillo PT  [place] LICENSE: [id number]

## 2024-07-25 RX ORDER — ESTRADIOL 0.1 MG/G
2 CREAM VAGINAL DAILY
Qty: 30 G | Refills: 3 | Status: SHIPPED | OUTPATIENT
Start: 2024-07-25

## 2024-07-26 ENCOUNTER — TREATMENT (OUTPATIENT)
Dept: PHYSICAL THERAPY | Facility: CLINIC | Age: 44
End: 2024-07-26
Payer: OTHER MISCELLANEOUS

## 2024-07-26 DIAGNOSIS — R26.89 BALANCE DISORDER: Primary | ICD-10-CM

## 2024-07-26 DIAGNOSIS — F07.81 POST CONCUSSIVE SYNDROME: ICD-10-CM

## 2024-07-26 NOTE — PROGRESS NOTES
Physical Therapy Daily Note  [address] [place] [address]    VISIT#: 40    Subjective   Monika Gu reports she had her VEMP test yesterday and reports she is not feeling well today.       Objective     See Exercise, Manual, and Modality Logs for complete treatment.     Assessment/Plan    Continued to progress patient's balance and vestibular testing today and patient tolerated but did have increase in symptoms today which could be due to VEMP testing yesterday. Pt did have increase in difficulty standing on unstable surface compared to previous sessions. Will continue to progress patient as able.       Progress per Plan of Care and Progress strengthening /stabilization /functional activity            Timed:                 Manual Therapy:    0     mins  35145;     Therapeutic Exercise:    0     mins  77364;     Neuromuscular Go:    15    mins  61094;    Therapeutic Activity:     25     mins  41979;     Gait Trainin     mins  28618;     Ultrasound:     0     mins  72981;    Ionto                               0    mins   85046  Self pay                         0     mins [email or web address]    Un-Timed:  Electrical Stimulation:    0     mins  01540 ( )  Canalith Repos    0     mins 59392  Dry Needling     0     mins self-pay  Traction     0     mins 44088    Timed Treatment:   40   mins   Total Treatment:     40   mins    Adan Portillo PT  Physical Therapist    PT SIGNATURE: Electronically signed by Adan Portillo PT  [place] LICENSE: [id number]

## 2024-07-30 ENCOUNTER — TREATMENT (OUTPATIENT)
Dept: PHYSICAL THERAPY | Facility: CLINIC | Age: 44
End: 2024-07-30
Payer: OTHER MISCELLANEOUS

## 2024-07-30 DIAGNOSIS — R26.89 BALANCE DISORDER: Primary | ICD-10-CM

## 2024-07-30 DIAGNOSIS — F07.81 POST CONCUSSIVE SYNDROME: ICD-10-CM

## 2024-07-30 NOTE — PROGRESS NOTES
Physical Therapy Daily Note  [address] [place] [address]    VISIT#: 41    Subjective   Monika Gu reports she feels like a migraine is starting. Pt reports her neck and mid back feel very tight today.       Objective     See Exercise, Manual, and Modality Logs for complete treatment.     Assessment/Plan    Continued to progress patient's balance and cognitive tasks and patient tolerated well, but did have decrease in balance with tandem tasks. Pt did respond better today to increase in noise in clinic, but still had headphones in. Did perform manual therapy to patient's neck and mid back today due to patient's pain and oncoming migraine and patient reports feeling better after manual therapy.     Progress per Plan of Care and Progress strengthening /stabilization /functional activity            Timed:                 Manual Therapy:    10     mins  49261;     Therapeutic Exercise:    0     mins  74869;     Neuromuscular Go:    27    mins  39780;    Therapeutic Activity:     8     mins  12088;     Gait Trainin     mins  91276;     Ultrasound:     0     mins  02457;    Ionto                               0    mins   63787  Self pay                         0     mins [email or web address]    Un-Timed:  Electrical Stimulation:    0     mins  43493 ( )  Canalith Repos    0     mins 34819  Dry Needling     0     mins self-pay  Traction     0     mins 55193    Timed Treatment:   45   mins   Total Treatment:     45   mins    Adan Portillo PT  Physical Therapist    PT SIGNATURE: Electronically signed by Adan Portillo PT  [place] LICENSE: [id number]

## 2024-07-31 ENCOUNTER — TELEPHONE (OUTPATIENT)
Dept: PHYSICAL THERAPY | Facility: CLINIC | Age: 44
End: 2024-07-31
Payer: COMMERCIAL

## 2024-07-31 NOTE — TELEPHONE ENCOUNTER
Caller: CONNIE    Relationship: Provider       What was the call regarding: DR MODI OFFICE CALLED SAID THEY HAD GOTTEN A REQUEST TO SEND IN AN ORDER ON PATIENT, THEY SAID THE REQUEST SHOULD BE MADE TO THE ORDERING DR, DR BOOGIE,

## 2024-08-05 ENCOUNTER — OFFICE VISIT (OUTPATIENT)
Dept: FAMILY MEDICINE CLINIC | Facility: CLINIC | Age: 44
End: 2024-08-05
Payer: COMMERCIAL

## 2024-08-05 VITALS
HEART RATE: 68 BPM | HEIGHT: 62 IN | BODY MASS INDEX: 24.29 KG/M2 | DIASTOLIC BLOOD PRESSURE: 71 MMHG | WEIGHT: 132 LBS | OXYGEN SATURATION: 98 % | SYSTOLIC BLOOD PRESSURE: 121 MMHG

## 2024-08-05 DIAGNOSIS — R23.3 PETECHIAE: ICD-10-CM

## 2024-08-05 DIAGNOSIS — H90.12 CONDUCTIVE HEARING LOSS OF LEFT EAR WITH UNRESTRICTED HEARING OF RIGHT EAR: ICD-10-CM

## 2024-08-05 DIAGNOSIS — Z12.31 SCREENING MAMMOGRAM FOR BREAST CANCER: ICD-10-CM

## 2024-08-05 DIAGNOSIS — H93.A3 PULSATILE TINNITUS OF BOTH EARS: ICD-10-CM

## 2024-08-05 DIAGNOSIS — E03.9 HYPOTHYROIDISM, UNSPECIFIED TYPE: Primary | ICD-10-CM

## 2024-08-05 DIAGNOSIS — R26.89 IMBALANCE: ICD-10-CM

## 2024-08-05 DIAGNOSIS — H83.8X2 SUPERIOR SEMICIRCULAR CANAL DEHISCENCE OF LEFT EAR: Primary | ICD-10-CM

## 2024-08-05 DIAGNOSIS — H93.232: ICD-10-CM

## 2024-08-05 DIAGNOSIS — K21.9 GASTROESOPHAGEAL REFLUX DISEASE, UNSPECIFIED WHETHER ESOPHAGITIS PRESENT: ICD-10-CM

## 2024-08-05 PROCEDURE — 99214 OFFICE O/P EST MOD 30 MIN: CPT | Performed by: STUDENT IN AN ORGANIZED HEALTH CARE EDUCATION/TRAINING PROGRAM

## 2024-08-05 RX ORDER — FAMOTIDINE 20 MG/1
20 TABLET, FILM COATED ORAL 2 TIMES DAILY
Qty: 60 TABLET | Refills: 1 | Status: SHIPPED | OUTPATIENT
Start: 2024-08-05

## 2024-08-05 RX ORDER — LEVOTHYROXINE SODIUM 0.07 MG/1
75 TABLET ORAL TAKE AS DIRECTED
Qty: 30 TABLET | Refills: 1 | Status: SHIPPED | OUTPATIENT
Start: 2024-08-05 | End: 2024-08-05

## 2024-08-05 RX ORDER — LEVOTHYROXINE SODIUM 0.07 MG/1
75 TABLET ORAL DAILY
Qty: 30 TABLET | Refills: 1 | Status: SHIPPED | OUTPATIENT
Start: 2024-08-05

## 2024-08-05 NOTE — PROGRESS NOTES
Subjective:       Monika Gu is a 44 y.o. female with a concurrent medical history of allergic rhinitis, hypothyroidism, gastroesophageal reflux disease, migraines, and traumatic brain injury, sleep disorder and past medical history of uterine cancer (adenocarcinoma in situ) status post hysterectomy in 2019 who presents for annual physical exam.      In terms of allergic rhinitis, current medications include Zyrtec and Astelin.  She has seen ENT previously.    In terms of hypothyroidism, she has been taking alternating doses of levothyroxine 75 mcg and 50 mcg.  Thyroid levels are normal within the last 2 months.        Monika would like to try going to a normal dosing rather than rotating different dosages.  We discussed the possibility of continuing 50 mcg daily versus keeping dose at 75 mcg daily.  Her preference is to increase the dose to 75 mcg daily.  Will make this adjustment today but we do want to avoid overtreating her so I will repeat TSH in 6 weeks time.    In terms of gastroesophageal reflux disease, she is on Dexilant but symptoms were uncontrolled.  Will prescribe Pepcid twice daily as well.    In terms of traumatic brain injury, this has been a long road for her going back to an injury last year for which I had evaluated her multiple times before referring her to multiple specialists.  The details of this are far too expensive for me to include in the body of this note but please refer to my other notes for further details, as well as specialist notes.    In terms of migraines, she has as needed Imitrex.    In terms of sleep disorder related to her traumatic brain injury, sleep doctor has started her on gabapentin and she reports improved symptoms.    In terms of palpitations, she is currently on metoprolol succinate 25 mg.    In terms of need for colorectal cancer screening, I had referred her to GI for this previously.  However, she says workup is still pending due to the traumatic brain  incident that she suffered in her recovery from that.      The following portions of the patient's history were reviewed and updated as appropriate: allergies, current medications, past family history, past medical history, past social history, past surgical history, and problem list.    Past Medical Hx:  Past Medical History:   Diagnosis Date    Abnormal ECG 2/2/2024    Sinus tachycardia    Allergic     Allergic rhinitis 2002    Seasonal allergy symptoms all seasons    Arrhythmia     Arthritis     Bilateral occipital neuralgia 05/07/2024    Cancer 3/19    Adenocarcinoma in situ    Concussion 10/2023    L FACIAL INJURY foot    Difficulty walking 10/25    Dizziness October 26, 2023    Concussion    Endometriosis     Fracture of nasal bones 1996    Left side of bridge of nose    GERD (gastroesophageal reflux disease)     Headache     Headache, tension-type 10/25    History of medical problems     Hyperthyroidism     Memory loss 10/25    Migraine 10/25    Nosebleed 2002    Dry weather and seasonal changes    BALBINA (obstructive sleep apnea) 05/22/2024    Shingles     Syncope 10/25    Tinnitus October 26, 2023    Concussion       Past Surgical Hx:  Past Surgical History:   Procedure Laterality Date    APPENDECTOMY      HYSTERECTOMY      OCCIPITAL NERVE BLOCK      OOPHORECTOMY  2019    ADENOCARCINOMA    TONSILLECTOMY         Current Meds:    Current Outpatient Medications:     levothyroxine (SYNTHROID, LEVOTHROID) 75 MCG tablet, Take 1 tablet by mouth Daily. Take one tablet Wed and Sat, Disp: 30 tablet, Rfl: 1    Azelastine HCl 137 MCG/SPRAY solution, by Each Nare route 2 (Two) Times a Day As Needed. USE 2 SPRAYS PER NOSTRIL TWICE A DAY AS NEEDED, Disp: , Rfl:     dexlansoprazole (DEXILANT) 60 MG capsule, Take 1 capsule by mouth Daily., Disp: 90 capsule, Rfl: 1    dicyclomine (BENTYL) 10 MG capsule, Take 1 capsule by mouth 4 (Four) Times a Day Before Meals & at Bedtime. (Patient taking differently: Take 1 capsule by mouth 4  (Four) Times a Day Before Meals & at Bedtime. prn), Disp: 16 capsule, Rfl: 0    estradiol (ESTRACE) 0.1 MG/GM vaginal cream, Insert 2 g into the vagina Daily., Disp: 30 g, Rfl: 3    famotidine (Pepcid) 20 MG tablet, Take 1 tablet by mouth 2 (Two) Times a Day., Disp: 60 tablet, Rfl: 1    gabapentin (NEURONTIN) 100 MG capsule, Take 1 capsule by mouth 3 (Three) Times a Day., Disp: 90 capsule, Rfl: 1    metoprolol succinate XL (TOPROL-XL) 25 MG 24 hr tablet, Take 1.5 tablets by mouth Daily., Disp: 90 tablet, Rfl: 3    SUMAtriptan (IMITREX) 25 MG tablet, Take 1 tablet by mouth 1 (One) Time., Disp: , Rfl:     vitamin D3 125 MCG (5000 UT) capsule capsule, Take 1 capsule by mouth Daily., Disp: , Rfl:     Allergies:  Allergies   Allergen Reactions    Lidocaine Hives, Nausea Only and Rash     Received occipital nerve block with lidocaine 5/15/24. Noticed rash the following day 5/16/24. More likely delayed hypersensitivity/contact dermatitis based on reaction, timing, and characteristics of lidocaine.    Fluoxetine Unknown - Low Severity    Amitriptyline Palpitations    Kenalog [Triamcinolone] Rash       Family Hx:  Family History   Problem Relation Age of Onset    Hypertension Mother     Thyroid disease Mother     Migraines Mother         Whole family    Anemia Mother     Sleep apnea Mother     Diabetes Father     Migraines Father         Whole family    Heart disease Father     Diabetes Brother     Cancer Maternal Aunt     Cancer Paternal Grandmother     Colon cancer Neg Hx         Social History:  Social History     Socioeconomic History    Marital status: Single   Tobacco Use    Smoking status: Never     Passive exposure: Past    Smokeless tobacco: Never   Vaping Use    Vaping status: Never Used   Substance and Sexual Activity    Alcohol use: Not Currently    Drug use: Never    Sexual activity: Not Currently     Partners: Male       Review of Systems  Review of Systems   Gastrointestinal:  Negative for abdominal pain.  "      Objective:     /71 (BP Location: Right arm, Patient Position: Sitting, Cuff Size: Adult)   Pulse 68   Ht 157.5 cm (62.01\")   Wt 59.9 kg (132 lb)   SpO2 98%   BMI 24.14 kg/m²   Physical Exam  Constitutional:       Appearance: Normal appearance.   Neurological:      Mental Status: She is alert.          Assessment/Plan:     Diagnoses and all orders for this visit:    1. Hypothyroidism, unspecified type (Primary)      In terms of hypothyroidism, she has been taking alternating doses of levothyroxine 75 mcg and 50 mcg.  Thyroid levels are normal within the last 2 months.        Monika would like to try going to a normal dosing rather than rotating different dosages.  We discussed the possibility of continuing 50 mcg daily versus keeping dose at 75 mcg daily.  Her preference is to increase the dose to 75 mcg daily.  Will make this adjustment today but we do want to avoid overtreating her so I will repeat TSH in 6 weeks time.    -     TSH+Free T4; Future  -     levothyroxine (SYNTHROID, LEVOTHROID) 75 MCG tablet; Take 1 tablet by mouth Daily. Take one tablet Wed and Sat  Dispense: 30 tablet; Refill: 1    2. Gastroesophageal reflux disease, unspecified whether esophagitis present      In terms of gastroesophageal reflux disease, she is on Dexilant but symptoms were uncontrolled.  Will prescribe Pepcid twice daily as well.    -     famotidine (Pepcid) 20 MG tablet; Take 1 tablet by mouth 2 (Two) Times a Day.  Dispense: 60 tablet; Refill: 1    3. Screening mammogram for breast cancer    Monika tells me she is due for a mammogram.  Will order this today.    -     Mammo Screening Bilateral With CAD; Future    4. Petechiae      Previously discussed petechiae on 6/14/2024.  CBC, PT, INR, PTT were all normal.  At the time, I believe that this was due to an allergic reaction to a lidocaine treatment she had had.  She had been seen by urgent care and treated with topical steroids without improvement.  When she " returns to me for this, I did not see medications that could cause a petechial rash such as NSAIDs or aspirin.  I had offered her the option to use a steroid but she had declined that option due to potential side effects.  Although I considered referral to dermatology at that point, I counseled her that the rash may improve on its own and I would hold off on referring her to dermatology unless she had failed to improve after 1 month.  Today she says she is not improved.  Will refer as previously discussed.      -     Ambulatory Referral to Dermatology          Rx changes: Attempting to try a 75 mcg daily dose of Synthroid  Follow-up:     Return in about 6 months (around 2/5/2025) for Hypothyroidism.    Preventative:  Health Maintenance   Topic Date Due    HEPATITIS C SCREENING  Never done    INFLUENZA VACCINE  08/01/2024    COLORECTAL CANCER SCREENING  10/13/2024    COVID-19 Vaccine (3 - 2023-24 season) 08/06/2024 (Originally 9/1/2023)    ANNUAL PHYSICAL  08/05/2025    MAMMOGRAM  08/17/2025    TDAP/TD VACCINES (3 - Tdap) 09/29/2033    Pneumococcal Vaccine 0-64  Aged Out         This document has been electronically signed by Tyrone Hagan MD on August 5, 2024 20:08 EDT       Parts of this note are electronic transcriptions/translations of spoken language to printed text using the Dragon Dictation system.

## 2024-08-06 ENCOUNTER — TELEPHONE (OUTPATIENT)
Dept: NEUROLOGY | Facility: CLINIC | Age: 44
End: 2024-08-06

## 2024-08-06 ENCOUNTER — TREATMENT (OUTPATIENT)
Dept: PHYSICAL THERAPY | Facility: CLINIC | Age: 44
End: 2024-08-06
Payer: OTHER MISCELLANEOUS

## 2024-08-06 DIAGNOSIS — F07.81 POST CONCUSSIVE SYNDROME: Primary | ICD-10-CM

## 2024-08-06 DIAGNOSIS — R26.89 BALANCE DISORDER: Primary | ICD-10-CM

## 2024-08-06 DIAGNOSIS — F07.81 POST CONCUSSIVE SYNDROME: ICD-10-CM

## 2024-08-06 NOTE — PROGRESS NOTES
Physical Therapy Daily Note  [address] [place] [address]    VISIT#: [id number]    Subjective   Monika Gu reports she found out last week that the surgeon that was to do her surgery is not in network with work comp insurance therefore she cannot have surgery done through him. Pt reports she has a referral in for surgeon at . Pt reports her appointment to see him is .       Objective     See Exercise, Manual, and Modality Logs for complete treatment.     Assessment/Plan    Continued to progress patient's balance exercises today. Pt did have new headphones today and patient reports the new headphones did help with the noise of the clinic. Pt continues to have increase in difficulty with balancing on unstable surfaces and tandem walking. Will continue to progress patient as able.     Progress per Plan of Care and Progress strengthening /stabilization /functional activity            Timed:                 Manual Therapy:    0     mins  63585;     Therapeutic Exercise:    0     mins  37101;     Neuromuscular Go:    15    mins  42534;    Therapeutic Activity:     30     mins  99678;     Gait Trainin     mins  76365;     Ultrasound:     0     mins  94534;    Ionto                               0    mins   54084  Self pay                         0     mins [email or web address]    Un-Timed:  Electrical Stimulation:    0     mins  76824 ( )  Canalith Repos    0     mins 12961  Dry Needling     0     mins self-pay  Traction     0     mins 44484    Timed Treatment:   45   mins   Total Treatment:     45   mins    Adan Portillo PT  Physical Therapist    PT SIGNATURE: Electronically signed by Adan Portillo PT  [place] LICENSE: [id number]

## 2024-08-06 NOTE — TELEPHONE ENCOUNTER
----- Message from Theodore Loyd sent at 8/6/2024 10:14 AM EDT -----  Regarding: FW: New order for PT  Contact: [phone]  Yes, that is fine.  thanks  ----- Message -----  From: Ra Sanchez MA  Sent: 8/6/2024  10:01 AM EDT  To: Theodore Loyd MD  Subject: FW: New order for PT                             Would you like to put in an order? Appears she started seeing Dr. Patel at [facility] and has a follow up appt with him  ----- Message -----  From: Monika Gu  Sent: 8/6/2024   9:12 AM EDT  To: [facility]  Subject: New order for PT                                 Good morning! Could Dr House please put in a new order for physical therapy for me? Thanks!

## 2024-08-09 ENCOUNTER — TREATMENT (OUTPATIENT)
Dept: PHYSICAL THERAPY | Facility: CLINIC | Age: 44
End: 2024-08-09
Payer: OTHER MISCELLANEOUS

## 2024-08-09 ENCOUNTER — TRANSCRIBE ORDERS (OUTPATIENT)
Dept: ADMINISTRATIVE | Facility: HOSPITAL | Age: 44
End: 2024-08-09
Payer: COMMERCIAL

## 2024-08-09 DIAGNOSIS — Z12.31 BREAST CANCER SCREENING BY MAMMOGRAM: Primary | ICD-10-CM

## 2024-08-09 DIAGNOSIS — R26.89 BALANCE DISORDER: Primary | ICD-10-CM

## 2024-08-09 DIAGNOSIS — F07.81 POST CONCUSSIVE SYNDROME: ICD-10-CM

## 2024-08-09 NOTE — PROGRESS NOTES
Physical Therapy Daily Note  [address] [place] [address]    VISIT#: [id number]    Subjective   Monika Gu reports she is feeling very tight in her upper back and neck today. Pt reports she has had a couple of bad days.       Objective     See Exercise, Manual, and Modality Logs for complete treatment.     Assessment/Plan    Continued to progress patient's exercises and patient tolerated well. Pt was challenged with SL balance exercise and took increase in time to complete secondary to decrease in balance. Pt did report significant relief of pain after manual therapy today. Will continue to progress patient as able.     Progress per Plan of Care and Progress strengthening /stabilization /functional activity            Timed:                 Manual Therapy:    23     mins  27919;     Therapeutic Exercise:    0     mins  73376;     Neuromuscular Go:    12    mins  63558;    Therapeutic Activity:     11     mins  83971;     Gait Trainin     mins  76333;     Ultrasound:     0     mins  17976;    Ionto                               0    mins   71520  Self pay                         0     mins [email or web address]    Un-Timed:  Electrical Stimulation:    0     mins  71429 ( )  Canalith Repos    0     mins 72845  Dry Needling     0     mins self-pay  Traction     0     mins 32434    Timed Treatment:   46   mins   Total Treatment:     46   mins    Adan Portillo PT  Physical Therapist    PT SIGNATURE: Electronically signed by Adan Portillo PT  [place] LICENSE: [id number]

## 2024-08-28 ENCOUNTER — OFFICE VISIT (OUTPATIENT)
Dept: SLEEP MEDICINE | Facility: HOSPITAL | Age: 44
End: 2024-08-28
Payer: COMMERCIAL

## 2024-08-28 VITALS
HEART RATE: 59 BPM | BODY MASS INDEX: 24.11 KG/M2 | HEIGHT: 62 IN | SYSTOLIC BLOOD PRESSURE: 90 MMHG | DIASTOLIC BLOOD PRESSURE: 55 MMHG | WEIGHT: 131 LBS | OXYGEN SATURATION: 100 %

## 2024-08-28 DIAGNOSIS — G25.81 RESTLESS LEGS SYNDROME (RLS): ICD-10-CM

## 2024-08-28 DIAGNOSIS — F51.01 PRIMARY INSOMNIA: Primary | ICD-10-CM

## 2024-08-28 DIAGNOSIS — F07.81 POST CONCUSSIVE SYNDROME: ICD-10-CM

## 2024-08-28 PROCEDURE — G0463 HOSPITAL OUTPT CLINIC VISIT: HCPCS

## 2024-08-28 RX ORDER — GABAPENTIN 100 MG/1
100 CAPSULE ORAL 3 TIMES DAILY
Qty: 270 CAPSULE | Refills: 1 | Status: SHIPPED | OUTPATIENT
Start: 2024-08-28

## 2024-08-28 NOTE — PROGRESS NOTES
"Follow Up Sleep Disorders Center Note     Chief Complaint: Insomnia and restless leg syndrome    Primary Care Physician: Tyrone Hagan MD    Interval History:   The patient is a 44 y.o. female  who patient has had trouble sleeping since concussion that occurred while she was at work.  Part of the problem sleeping by have ascribed to restless legs and part of it I prescribed to I was hopeful that the gabapentin at night would improve slow-wave or deep sleep in addition to restless leg symptoms.  She comes in today tell me the 200 mg does seem to work better than 100 and so I think I will offer her 300 mg titration if she wants it to see whether her sleep improves further.  I think the primary symptoms, postconcussive will require better sleep and more time.    She did have polysomnogram on 7/5/2024 and the interested reader is directed to that report.    Review of Systems:    A complete review of systems was done and all were negative with the exception of she awakens 2-4 times per night and is not sure why    Social History:    Social History     Socioeconomic History    Marital status: Single   Tobacco Use    Smoking status: Never     Passive exposure: Past    Smokeless tobacco: Never   Vaping Use    Vaping status: Never Used   Substance and Sexual Activity    Alcohol use: Not Currently    Drug use: Never    Sexual activity: Not Currently     Partners: Male       Allergies:  Lidocaine, Fluoxetine, Amitriptyline, and Kenalog [triamcinolone]     Medication Review:  Reviewed.      Vital Signs:    Vitals:    08/28/24 0900   BP: 90/55   BP Location: Left arm   Patient Position: Sitting   Pulse: 59   SpO2: 100%   Weight: 59.4 kg (131 lb)   Height: 157.5 cm (62.01\")     Body mass index is 23.95 kg/m².  .BMIFOLLOWUP    Physical Exam:    Constitutional:  Well developed 44 y.o. female that appears in no apparent distress.  Awake & oriented times 3.  Normal mood with normal recent and remote memory and normal " judgement.  Eyes:  Conjunctivae normal.      Self-administered [place] Sleepiness Scale test results: 4  0-5 Lower normal daytime sleepiness  6-10 Higher normal daytime sleepiness  11-12 Mild, 13-15 Moderate, & 16-24 Severe excessive daytime sleepiness         Impression:     Encounter Diagnoses   Name Primary?    Primary insomnia Yes    Post concussive syndrome     Restless legs syndrome (RLS)          Plan:  Good sleep hygiene measures should be maintained.       Increase gabapentin to 300 mg nightly if she likes that better I would maintain that.    I answered all of the patient's questions.  The patient will call the Sleep Disorder Center for any problems and will follow up 3 months.      Emmett Lopez MD  Sleep Medicine  08/28/24  10:14 EDT

## 2024-09-03 ENCOUNTER — HOSPITAL ENCOUNTER (OUTPATIENT)
Dept: MAMMOGRAPHY | Facility: HOSPITAL | Age: 44
Discharge: HOME | End: 2024-09-03
Admitting: STUDENT IN AN ORGANIZED HEALTH CARE EDUCATION/TRAINING PROGRAM
Payer: COMMERCIAL

## 2024-09-03 ENCOUNTER — TELEPHONE (OUTPATIENT)
Dept: FAMILY MEDICINE CLINIC | Facility: CLINIC | Age: 44
End: 2024-09-03
Payer: COMMERCIAL

## 2024-09-03 DIAGNOSIS — Z12.31 BREAST CANCER SCREENING BY MAMMOGRAM: ICD-10-CM

## 2024-09-03 PROCEDURE — 77067 SCR MAMMO BI INCL CAD: CPT

## 2024-09-03 PROCEDURE — 77063 BREAST TOMOSYNTHESIS BI: CPT

## 2024-09-03 NOTE — PROGRESS NOTES
No signs of malignancy were seen.  Can we let her know?    Thank you,    Tyrone Hagan    ?  This document has been electronically signed by Tyrone Hagan MD on September 3, 2024 15:36 EDT

## 2024-09-03 NOTE — TELEPHONE ENCOUNTER
Name: Monika Gu    Relationship: Self    Best Callback Number:    [phone]       HUB PROVIDED THE RELAY MESSAGE FROM THE OFFICE   PATIENT VOICED UNDERSTANDING AND HAS NO FURTHER QUESTIONS AT THIS TIME    ADDITIONAL INFORMATION:

## 2024-09-04 DIAGNOSIS — K21.9 GASTROESOPHAGEAL REFLUX DISEASE, UNSPECIFIED WHETHER ESOPHAGITIS PRESENT: ICD-10-CM

## 2024-09-05 RX ORDER — FAMOTIDINE 20 MG/1
20 TABLET, FILM COATED ORAL 2 TIMES DAILY
Qty: 60 TABLET | Refills: 6 | Status: SHIPPED | OUTPATIENT
Start: 2024-09-05

## 2024-09-16 ENCOUNTER — TELEPHONE (OUTPATIENT)
Dept: SLEEP MEDICINE | Facility: HOSPITAL | Age: 44
End: 2024-09-16

## 2024-09-16 NOTE — TELEPHONE ENCOUNTER
HER APT. WITH SLEEP WAS UNDER [employer] COMP.SHE IS GETTING BILLED FOR ALL HER VISITS WITH US .ICD CODING NEEDS TO BE FOR CONCUSSION . THIS IS WHAT SHE IS BEING TOLD BY BILLING

## 2024-09-19 ENCOUNTER — LAB (OUTPATIENT)
Dept: LAB | Facility: HOSPITAL | Age: 44
End: 2024-09-19
Payer: COMMERCIAL

## 2024-09-19 DIAGNOSIS — E03.9 HYPOTHYROIDISM, UNSPECIFIED TYPE: ICD-10-CM

## 2024-09-19 LAB
T4 FREE SERPL-MCNC: 1.42 NG/DL (ref 0.92–1.68)
TSH SERPL DL<=0.05 MIU/L-ACNC: 0.62 UIU/ML (ref 0.27–4.2)

## 2024-09-19 PROCEDURE — 36415 COLL VENOUS BLD VENIPUNCTURE: CPT

## 2024-09-19 PROCEDURE — 84439 ASSAY OF FREE THYROXINE: CPT

## 2024-09-19 PROCEDURE — 84443 ASSAY THYROID STIM HORMONE: CPT

## 2024-09-24 ENCOUNTER — OFFICE VISIT (OUTPATIENT)
Dept: INTERNAL MEDICINE | Age: 44
End: 2024-09-24
Payer: COMMERCIAL

## 2024-09-24 VITALS
HEIGHT: 62 IN | WEIGHT: 130.2 LBS | TEMPERATURE: 97.7 F | HEART RATE: 67 BPM | BODY MASS INDEX: 23.96 KG/M2 | DIASTOLIC BLOOD PRESSURE: 72 MMHG | SYSTOLIC BLOOD PRESSURE: 114 MMHG | OXYGEN SATURATION: 99 %

## 2024-09-24 DIAGNOSIS — R00.2 PALPITATIONS: ICD-10-CM

## 2024-09-24 DIAGNOSIS — Z00.00 WELL ADULT EXAM: Primary | ICD-10-CM

## 2024-09-24 DIAGNOSIS — E03.9 HYPOTHYROIDISM, UNSPECIFIED TYPE: ICD-10-CM

## 2024-09-24 DIAGNOSIS — E03.4 HYPOTHYROIDISM DUE TO ACQUIRED ATROPHY OF THYROID: ICD-10-CM

## 2024-09-24 DIAGNOSIS — J01.01 ACUTE RECURRENT MAXILLARY SINUSITIS: ICD-10-CM

## 2024-09-24 DIAGNOSIS — F07.81 POST CONCUSSIVE SYNDROME: ICD-10-CM

## 2024-09-24 DIAGNOSIS — K21.9 GASTROESOPHAGEAL REFLUX DISEASE WITHOUT ESOPHAGITIS: ICD-10-CM

## 2024-09-24 DIAGNOSIS — F51.04 PSYCHOPHYSIOLOGICAL INSOMNIA: ICD-10-CM

## 2024-09-24 PROBLEM — R10.30 LOWER ABDOMINAL PAIN: Status: RESOLVED | Noted: 2023-12-29 | Resolved: 2024-09-24

## 2024-09-24 PROBLEM — K59.09 CHRONIC CONSTIPATION: Status: RESOLVED | Noted: 2023-12-29 | Resolved: 2024-09-24

## 2024-09-24 PROBLEM — R19.5 CHANGE IN CONSISTENCY OF STOOL: Status: RESOLVED | Noted: 2023-12-29 | Resolved: 2024-09-24

## 2024-09-24 PROBLEM — K92.1 BLOOD IN STOOL: Status: RESOLVED | Noted: 2023-12-29 | Resolved: 2024-09-24

## 2024-09-24 PROCEDURE — 99396 PREV VISIT EST AGE 40-64: CPT | Performed by: INTERNAL MEDICINE

## 2024-09-24 RX ORDER — LEVOTHYROXINE SODIUM 75 UG/1
75 TABLET ORAL DAILY
Qty: 90 TABLET | Refills: 1 | Status: SHIPPED | OUTPATIENT
Start: 2024-09-24

## 2024-09-24 NOTE — ASSESSMENT & PLAN NOTE
This is the indication for the low-dose metoprolol, had full workup by Dr. Meredith and has ongoing peripheral follow-up at least.
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
## 2024-09-24 NOTE — ASSESSMENT & PLAN NOTE
TSH is down from 4.0 to 0.6 as of her 9/24 OV.  She was just increased from 50/75 alternating to 75 mcg daily.  She feels better from the fatigue issue, is not having any significant increase in palpitations etc., so stable to continue same.

## 2024-09-24 NOTE — ASSESSMENT & PLAN NOTE
Patient is maintained on dual therapy per Dr Edmonds, has longstanding history of this, no ulcers, no stricture/dilatation.  When she has colonoscopy down the road, he will do an EGD as well.    Patient stable to continue moderate dose Dexilant and moderate dose Pepcid as written.

## 2024-09-24 NOTE — ASSESSMENT & PLAN NOTE
She is having a flare of things as of her 9/24 OV.  She is on Astelin nasal spray as well as Zyrtec 10 to 20 mg a day.    Patient cannot tolerate prednisone presently, we will go ahead and treat with antibiotic.  Augmentin has generally worked in the past, will utilize this now.

## 2024-09-24 NOTE — ASSESSMENT & PLAN NOTE
Patient had formal sleep study, is followed by neurology for this, appears to be benefiting from low-dose gabapentin, she utilizes 200 mg at night, does not typically use this during the day.

## 2024-09-26 ENCOUNTER — PATIENT ROUNDING (BHMG ONLY) (OUTPATIENT)
Dept: INTERNAL MEDICINE | Age: 44
End: 2024-09-26
Payer: COMMERCIAL

## 2024-09-26 DIAGNOSIS — E03.9 HYPOTHYROIDISM, UNSPECIFIED TYPE: ICD-10-CM

## 2024-09-26 RX ORDER — LEVOTHYROXINE SODIUM 75 UG/1
TABLET ORAL
Qty: 24 TABLET | Refills: 2 | OUTPATIENT
Start: 2024-09-26

## 2024-10-01 RX ORDER — DEXLANSOPRAZOLE 60 MG/1
60 CAPSULE, DELAYED RELEASE ORAL DAILY
Qty: 90 CAPSULE | Refills: 1 | Status: SHIPPED | OUTPATIENT
Start: 2024-10-01

## 2024-10-01 NOTE — TELEPHONE ENCOUNTER
Caller: Monika Gu    Relationship: Self    Best call back number: [phone]     Requested Prescriptions:   Requested Prescriptions     Pending Prescriptions Disp Refills    dexlansoprazole (DEXILANT) 60 MG capsule 90 capsule 1     Sig: Take 1 capsule by mouth Daily.        Pharmacy where request should be sent: [id number] - [place], [place] - [address] [facility] - [phone]  - [phone] FX     Last office visit with prescribing clinician: 9/24/2024   Last telemedicine visit with prescribing clinician: Visit date not found   Next office visit with prescribing clinician: 1/6/2025     Does the patient have less than a 3 day supply:  [x] Yes  [] No    Would you like a call back once the refill request has been completed: [x] Yes [] No    If the office needs to give you a call back, can they leave a voicemail: [x] Yes [] No    Pepper Rosario Rep   10/01/24 14:41 EDT

## 2024-10-03 ENCOUNTER — TELEPHONE (OUTPATIENT)
Dept: NEUROLOGY | Facility: CLINIC | Age: 44
End: 2024-10-03
Payer: COMMERCIAL

## 2024-10-03 DIAGNOSIS — F07.81 POST CONCUSSIVE SYNDROME: Primary | ICD-10-CM

## 2024-10-03 NOTE — TELEPHONE ENCOUNTER
Provider: DR BOOGIE    Caller: PATIENT    Relationship to Patient: SELF    Phone Number: [phone]    Reason for Call: STATES ORDERS FOR PT HAVE BEEN DENIED BY WORKERS COMP AS THEY DON'T HAVE A FREQUENCY & DURATION. STATES SHE HAS BEEN DOING INCREMENTS OF 2x A WEEK FOR 3 MONTHS OR 24 VISITS. PLEASE REVIEW & UPDATE, THANK YOU.

## 2024-10-05 DIAGNOSIS — E03.9 HYPOTHYROIDISM, UNSPECIFIED TYPE: ICD-10-CM

## 2024-10-07 RX ORDER — LEVOTHYROXINE SODIUM 50 UG/1
TABLET ORAL
Qty: 72 TABLET | Refills: 2 | OUTPATIENT
Start: 2024-10-07

## 2024-10-08 NOTE — TELEPHONE ENCOUNTER
CALLED PT STATES YES SHE DID HAVE THE EXTRA IMAGING DONE AND WILL BRING WITH HER AT THE NEXT OV.     Include Location In Plan?: No Additional Note: Recommend IPL laser - provided clinics to have this performed. Strict sunscreen also recommended. Discussed LN2, test spot performed on hand.  Detail Level: Zone

## 2024-10-23 NOTE — ASSESSMENT & PLAN NOTE
Above note reviewed and appreciated.  This is the patient's main present issue still, has balance issues, auditory sensitivities, panic attacks etc. secondary to the above.

## 2024-10-24 ENCOUNTER — TELEPHONE (OUTPATIENT)
Dept: NEUROLOGY | Facility: CLINIC | Age: 44
End: 2024-10-24

## 2024-10-24 NOTE — TELEPHONE ENCOUNTER
Caller: Monika Gu    Relationship: Self    Best call back number: [phone]    PT STATES SHE SAW 2ND OPINION THROUGH HER WORKERS COMP (AS WAS REQUIRED), THE PROVIDER SHE SAW ADVISED SHE MAY BENEFIT FROM A MIGRAINE PREVENTATIVE MEDICATION (ADVISED HER THE MIGRAINES MAY BE CONTRIBUTING TO HER COGNITIVE ISSUES), PT IS INQUIRING TO ASK DR. BOOGIE IF HE WOULD PRESCRIBE A MIGRAINE PREVENTATIVE MEDICATION.    NOTE: PT STATES SHE WILL BE GLAD TO COME IN FOR AN APPT TO DISCUSS THIS W/DR. BOOGIE IF NEEDED.      PLEASE REVIEW AND ADVISE.

## 2024-10-31 DIAGNOSIS — E03.9 HYPOTHYROIDISM, UNSPECIFIED TYPE: ICD-10-CM

## 2024-10-31 RX ORDER — LEVOTHYROXINE SODIUM 75 UG/1
75 TABLET ORAL DAILY
Qty: 90 TABLET | Refills: 1 | Status: SHIPPED | OUTPATIENT
Start: 2024-10-31

## 2024-10-31 NOTE — TELEPHONE ENCOUNTER
Patient has established with new PCP, Dr. Endy Gustafson.  Can we please route this request to his office?    Thank you,    Tyrone Hagan      This document has been electronically signed by Tyrone Hagan MD on October 31, 2024 08:58 EDT

## 2024-11-05 ENCOUNTER — OFFICE VISIT (OUTPATIENT)
Dept: NEUROLOGY | Facility: CLINIC | Age: 44
End: 2024-11-05
Payer: COMMERCIAL

## 2024-11-05 VITALS
WEIGHT: 135 LBS | BODY MASS INDEX: 24.84 KG/M2 | DIASTOLIC BLOOD PRESSURE: 68 MMHG | SYSTOLIC BLOOD PRESSURE: 112 MMHG | HEART RATE: 65 BPM | HEIGHT: 62 IN | OXYGEN SATURATION: 98 %

## 2024-11-05 DIAGNOSIS — F07.81 POST CONCUSSIVE SYNDROME: Primary | ICD-10-CM

## 2024-11-05 PROCEDURE — 99214 OFFICE O/P EST MOD 30 MIN: CPT | Performed by: PSYCHIATRY & NEUROLOGY

## 2024-11-05 RX ORDER — SUMATRIPTAN 25 MG/1
25 TABLET, FILM COATED ORAL ONCE AS NEEDED
Qty: 9 TABLET | Refills: 2 | Status: SHIPPED | OUTPATIENT
Start: 2024-11-05

## 2024-11-05 RX ORDER — METOPROLOL SUCCINATE 50 MG/1
50 TABLET, EXTENDED RELEASE ORAL DAILY
Qty: 90 TABLET | Refills: 1 | Status: SHIPPED | OUTPATIENT
Start: 2024-11-05
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
## 2024-12-03 ENCOUNTER — TELEPHONE (OUTPATIENT)
Dept: SLEEP MEDICINE | Facility: HOSPITAL | Age: 44
End: 2024-12-03

## 2024-12-03 ENCOUNTER — OFFICE VISIT (OUTPATIENT)
Dept: SLEEP MEDICINE | Facility: HOSPITAL | Age: 44
End: 2024-12-03
Payer: COMMERCIAL

## 2024-12-03 VITALS — WEIGHT: 136.1 LBS | HEART RATE: 68 BPM | HEIGHT: 62 IN | OXYGEN SATURATION: 98 % | BODY MASS INDEX: 25.04 KG/M2

## 2024-12-03 DIAGNOSIS — G25.81 RESTLESS LEGS SYNDROME (RLS): ICD-10-CM

## 2024-12-03 DIAGNOSIS — F07.81 POST CONCUSSIVE SYNDROME: ICD-10-CM

## 2024-12-03 DIAGNOSIS — F51.01 PRIMARY INSOMNIA: Primary | ICD-10-CM

## 2024-12-03 PROCEDURE — G0463 HOSPITAL OUTPT CLINIC VISIT: HCPCS

## 2024-12-03 PROCEDURE — 99214 OFFICE O/P EST MOD 30 MIN: CPT | Performed by: FAMILY MEDICINE

## 2024-12-03 RX ORDER — GABAPENTIN 100 MG/1
400 CAPSULE ORAL 4 TIMES DAILY
Qty: 360 CAPSULE | Refills: 1 | Status: SHIPPED | OUTPATIENT
Start: 2024-12-03

## 2024-12-03 NOTE — PROGRESS NOTES
"Follow Up Sleep Disorders Center Note     Chief Complaint: Insomnia and restless leg syndrome    Primary Care Physician: Endy Gustafson MD    Interval History:   The patient is a 44 y.o. female  who was last seen in the sleep lab: 8/28/2024.  Patient of Dr. Barbour.  Presents today for follow-up on insomnia and restless leg syndrome.  PSG was done in July 2024 which showed poor sleep efficiency, AHI of 0.2 lowest SpO2 92%.  At last visit patient was advised to increase gabapentin to 300 mg nightly to help with insomnia/restless leg syndrome.  Insomnia thought to be secondary to postconcussive syndrome.  Tobin reviewed; gabapentin last dispensed 10/15/2024.  Today patient reports she wakes up between 1 AM and 2 AM and is awake until 4 5 AM 2 or 3 times a week.  Increase to 300 mg at help initially however now becoming more tolerant.  Has craniotomy scheduled for later this month; issues related to this could be contributing to her interrupted sleep.    Review of Systems:    A complete review of systems was done and all were negative with the exception of nasal congestion postnasal drip acid reflux    Social History:    Social History     Socioeconomic History    Marital status: Single   Tobacco Use    Smoking status: Never     Passive exposure: Past    Smokeless tobacco: Never   Vaping Use    Vaping status: Never Used   Substance and Sexual Activity    Alcohol use: Not Currently    Drug use: Never    Sexual activity: Not Currently     Partners: Male       Allergies:  Lidocaine, Fluoxetine, Amitriptyline, and Kenalog [triamcinolone]     Medication Review:  Reviewed.      Vital Signs:    Vitals:    12/03/24 0900   Pulse: 68   SpO2: 98%   Weight: 61.7 kg (136 lb 1.6 oz)   Height: 157.5 cm (62.01\")     Body mass index is 24.89 kg/m².    Physical Exam:    Constitutional:  Well developed 44 y.o. female that appears in no apparent distress.  Awake & oriented times 3.  Normal mood with normal recent and remote memory and " normal judgement.  Eyes:  Conjunctivae normal.  Oropharynx: Previously, moist mucous membranes.    Self-administered [place] Sleepiness Scale test results: 3  0-5 Lower normal daytime sleepiness  6-10 Higher normal daytime sleepiness  11-12 Mild, 13-15 Moderate, & 16-24 Severe excessive daytime sleepiness    Impression:   1. Primary insomnia    2. Post concussive syndrome    3. Restless legs syndrome (RLS)        Okay to titrate gabapentin up to 400 mg nightly; of note she does take metoprolol at night as well for elevated heart rate; patient aware to continue to space out by about 2 hours.  We discussed concern for risk for CNS depression; sit up in bed for 2 or 3 minutes before getting up out of bed to avoid orthostatic hypotension.  Refill order placed today.  Patient also amenable to CBT-I for primary insomnia.  Referral order placed for this today to [facility] psychology.    Plan:  Good sleep hygiene measures should be maintained.  Normal body weight by Body mass index is 24.89 kg/m²..      I answered all of the patient's questions.  The patient will call for any problems and will follow up in 6 months.      Ciaran Altman MD  Sleep Medicine  12/03/24  12:23 EST

## 2024-12-04 ENCOUNTER — TELEPHONE (OUTPATIENT)
Dept: SLEEP MEDICINE | Facility: HOSPITAL | Age: 44
End: 2024-12-04
Payer: COMMERCIAL

## 2024-12-04 NOTE — TELEPHONE ENCOUNTER
Spoke with pt today regarding her billing issues and how it needed to be corrected.  Pt needs for her sleep study to be billed under the [employer] bill and not under her personal accounts.    Call to billing and spoke with Anai.  She was the best and was so thorough in taking care of the pts chart.  She did see the issue and was able to switch the account information now that the dr had changed to the correct diagnosis.    Call to the pt, left message asking her to call our office for an update on this issue.

## 2024-12-24 ENCOUNTER — READMISSION MANAGEMENT (OUTPATIENT)
Dept: CALL CENTER | Facility: HOSPITAL | Age: 44
End: 2024-12-24
Payer: COMMERCIAL

## 2024-12-25 NOTE — OUTREACH NOTE
Prep Survey      Flowsheet Row Responses   [facility] facility patient discharged from? Non-BH   Is LACE score < 7 ? Non-BH Discharge   Eligibility Not Eligible   What are the reasons patient is not eligible? Other  [not a [facility] PCP]   Does the patient have one of the following disease processes/diagnoses(primary or secondary)? Other   Prep survey completed? Yes            Sari Jensen Registered Nurse

## 2025-01-03 ENCOUNTER — TREATMENT (OUTPATIENT)
Dept: PHYSICAL THERAPY | Facility: CLINIC | Age: 45
End: 2025-01-03
Payer: OTHER MISCELLANEOUS

## 2025-01-03 ENCOUNTER — TRANSCRIBE ORDERS (OUTPATIENT)
Dept: PHYSICAL THERAPY | Facility: CLINIC | Age: 45
End: 2025-01-03
Payer: COMMERCIAL

## 2025-01-03 DIAGNOSIS — R26.89 BALANCE PROBLEM: ICD-10-CM

## 2025-01-03 DIAGNOSIS — Z98.890 S/P CRANIOTOMY: ICD-10-CM

## 2025-01-03 DIAGNOSIS — H83.8X9 SEMICIRCULAR CANAL DEHISCENCE SYNDROME: Primary | ICD-10-CM

## 2025-01-03 DIAGNOSIS — R29.898 WEAKNESS OF BOTH LOWER EXTREMITIES: ICD-10-CM

## 2025-01-03 DIAGNOSIS — R26.9 GAIT DISTURBANCE: ICD-10-CM

## 2025-01-03 NOTE — PROGRESS NOTES
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 Prior to surgery patient received several months of OP PT for post concussive syndrome after suffering a concussion on 10/25/2023 at work. Pt had to spend longer in hospital than originally expected due to increase in nausea and vomiting, dizziness, double vision, and tinnitus. Pt did get discharged on 2024 to go home. Pt has a friend who is staying with her  and can assist patient as needed. Pt is using a RW for ambulation and has headphones in to aide with decreasing the sound. Pt reports since surgery she is needing some assistance with ADL's, but is quickly becoming independent with a shower chair and multiple grab bars for bathing. Pt reports her friend is driving her to all appointments. Pt reports she cannot bend over, reach overhead, or pull on something like the refrigerator door due to increase pressure in her head. Pt reports if she turns her head too fast she will get dizzy. Pt states that the tinnitus is all of the time and the double vision is intermittent. Pt reports she is also having neuralgia pain on the L side of her head. Pt states she can also still hear her eye movements and heart beating. Lastly, patient does have increase in R LE weakness since surgery and reports her R knee is swollen, numb, and itches on the anterolateral side. Pt follows up with surgeon on 2025 and ophthalmologist on  2025.        Patient Occupation: School psychologist Pain  Current pain ratin  At best pain ratin  At worst pain ratin  Quality: Incisional pain - pain at L mastoid bone.  Relieving factors: ice (Tylenol)  Aggravating factors: movement, outstretched reach, ambulation, standing, repetitive movement, prolonged positioning and overhead activity    Patient Goals  Patient goals for therapy: decreased pain, improved balance, increased motion, increased strength, independence with ADLs/IADLs, return to work and return to sport/leisure activities  Patient goal: To be independent and to go back to work           Objective          Strength/Myotome Testing     Left Hip   Planes of Motion   Flexion: 4-  Abduction: 4-  Adduction: 4-    Right Hip   Planes of Motion   Flexion: 3+  Abduction: 3+  Adduction: 3+    Left Knee   Flexion: 4-  Extension: 4-    Right Knee   Flexion: 4-  Extension: 4-    Additional Strength Details  All testing performed in sitting.    Ambulation     Observational Gait     Additional Observational Gait Details  Pt ambulates with RW 50' x 2 with significant decrease in gait speed and minimal scanning of a room. Pt has decrease in stride length with steps.     Functional Assessment     Comments  Pt is able to perform STS with supervision. Pt takes increase in time to perform STS.    Unable to perform balance testing or Garvey due to patient's decrease tolerance to movement and safety concerns. Will perform when patient is more appropriate.      General Comments     Cervical/Thoracic Comments  Incision is closed with staples in place. No signs or symptoms of infection.          See Exercise, Manual, and Modality Logs for complete treatment.       Assessment & Plan       Assessment  Impairments: abnormal gait, abnormal or restricted ROM, activity intolerance, impaired balance, impaired physical strength, lacks appropriate home exercise program, pain with function, safety issue and  weight-bearing intolerance   Functional limitations: lifting, sleeping, walking, pulling, pushing, uncomfortable because of pain, sitting, standing and reaching overhead   Assessment details: Pt presents to PT s/p infratemporal fossa craniotomy on 12/18/2024 to repair superior semicircular canal dehiscence. Pt has significant decrease in LE strength with R LE being weaker than L LE. Pt also requires use of RW for ambulation and has decrease in gait speed with ambulation. Pt is unable to perform visual scanning of room due to increase in dizziness with scanning. Pt has decrease in balance and overall decrease in functional mobility. Pt currently requires 24/7 supervision from friend at home. Testing was limited today due to patient's tolerance to activity, will perform further testing as patient's endurance and tolerance improves. Pt requires skilled PT in order to address deficits listed to return patient back to maximum function such as work. Will initiate a HEP for patient once patient can tolerate.   Prognosis: good    Goals  Plan Goals:     1. Mobility: Walking/Moving Around Functional Limitation     LTG 1: 12 weeks:  The patient will demonstrate TUG score <20 seconds for reduced fall risk.     STATUS:  New   STG 1a: 6 weeks:  The patient will demonstrate TUG score <30 seconds for reduced fall risk.     STATUS:  New        2. The patient has limited strength of the B shoulders and B hips.   LTG 2: 12 weeks:  The patient will demonstrate 5 /5 strength for B hip flexion, abduction, and extension in order to improve hip stability.    STATUS:  New   LTG 2a: 6 weeks:  The patient will demonstrate 4 /5 strength for B hip flexion, abduction, and extension in order to improve hip stability.    STATUS:  New         3. The patient has gait dysfunction.   LTG 3: 12 weeks:  The patient will demonstrate > 24 / 30 on the Functional Gait Assessment for improved functional mobility.     STATUS:  New   STG 3a: The patient will  demonstrate > 18 / 30 on the Functional Gait Assessment for improved functional mobility.     STATUS:  New          4. The patient has difficulty with balance.    LTG 4: 12 weeks: The patient will hold the tandem position on stable surface with eyes open for 30 seconds in order to improve balance and decrease risk of falling.     STATUS: New    STG 4: 6 weeks: The patient will hold the feet together position on unstable surface with eyes open for 30 seconds in order to improve balance and decrease risk of falling.     STATUS: New       5. The patient has gait dysfunction.     LTG 3: 12 weeks: The patient will ambulate without assistive device, independently, for community distances in order to improve mobility and allow patient to perform activities such as grocery shopping with greater ease.   STATUS: New   LTG 3: 12 weeks: The patient will ambulate without assistive device, independently, for household distances in order to improve mobility and allow patient to perform activities such as ADL's with greater ease.   STATUS: New      Plan  Therapy options: will be seen for skilled therapy services  Planned modality interventions: cryotherapy, TENS, electrical stimulation/Russian stimulation and thermotherapy (hydrocollator packs)  Planned therapy interventions: manual therapy, neuromuscular re-education, ADL retraining, balance/weight-bearing training, flexibility, functional ROM exercises, gait training, home exercise program, joint mobilization, soft tissue mobilization, strengthening, stretching, therapeutic activities, abdominal trunk stabilization, postural training, spinal/joint mobilization, IADL retraining and transfer training  Frequency: 2x week  Duration in weeks: 12  Treatment plan discussed with: patient        History # of Personal Factors and/or Comorbidities: MODERATE (1-2)  Examination of Body System(s): # of elements: MODERATE (3)  Clinical Presentation: STABLE   Clinical Decision Making:  MODERATE      Timed:         Manual Therapy:    0     mins  82815;     Therapeutic Exercise:    0     mins  46533;     Neuromuscular Go:    0    mins  78814;    Therapeutic Activity:     15     mins  42466;     Gait Trainin     mins  49782;     Ultrasound:     0     mins  48868;    Ionto                               0    mins   76785  Self pay                         0     mins PTSPMIN2    Un-Timed:  Electrical Stimulation:    0     mins  71605 (MC )  Traction     0     mins 46958  Low Eval     0     Mins  27086  Mod Eval     45     Mins  47018  High Eval                       0     Mins  79348  Self Pay Eval                 0     PTSP1   Re-Eval                           0    mins  29371        Timed Treatment:   15   mins   Total Treatment:     60   mins    PT SIGNATURE: Electronically signed by [facility]ER [facility] LICENSE: [id number]    DATE TREATMENT INITIATED: 2025    Initial Certification  Certification Period: 2025 thru 2025  I certify that the therapy services are furnished while this patient is under my care.  The services outlined above are required by this patient, and will be reviewed every 90 days.     PHYSICIAN:    NPI:       DATE:     Please sign and return via fax to [phone] Thank you, [facility].

## 2025-01-07 ENCOUNTER — TELEPHONE (OUTPATIENT)
Dept: INTERNAL MEDICINE | Age: 45
End: 2025-01-07
Payer: COMMERCIAL

## 2025-01-07 NOTE — TELEPHONE ENCOUNTER
Pt was on your schedule for yesterday, I called and rescheduled her for the beginning of February.   She wanted you to know that she had her brain surgery in December at .

## 2025-01-22 ENCOUNTER — TELEPHONE (OUTPATIENT)
Dept: SLEEP MEDICINE | Facility: HOSPITAL | Age: 45
End: 2025-01-22
Payer: COMMERCIAL

## 2025-01-22 NOTE — TELEPHONE ENCOUNTER
Patient was contacted by [facility] to schedule an appointment. She did not complete online registration to continue.     I will close referral at this time.

## 2025-02-12 ENCOUNTER — TRANSCRIBE ORDERS (OUTPATIENT)
Dept: PHYSICAL THERAPY | Facility: CLINIC | Age: 45
End: 2025-02-12
Payer: COMMERCIAL

## 2025-02-12 DIAGNOSIS — R53.1 GENERALIZED WEAKNESS: Primary | ICD-10-CM

## 2025-02-12 DIAGNOSIS — R53.81 PHYSICAL DECONDITIONING: ICD-10-CM

## 2025-02-12 DIAGNOSIS — R42 VERTIGO: Primary | ICD-10-CM

## 2025-02-17 NOTE — PROGRESS NOTES
[facility]  Cardiology progress Note    Patient Name: Monika Gu  : 1980    CHIEF COMPLAINT  Palpitations        Subjective   Subjective     HISTORY OF PRESENT ILLNESS    Monika Gu is a 44 y.o. female with history of palpitations.  No further palpitations.    REVIEW OF SYSTEMS    Constitutional:    No fever, no weight loss  Skin:     No rash  Otolaryngeal:    No difficulty swallowing  Cardiovascular: See HPI.  Pulmonary:    No cough, no sputum production    Personal History     Social History:    reports that she has never smoked. She has been exposed to tobacco smoke. She has never used smokeless tobacco. She reports that she does not currently use alcohol. She reports that she does not use drugs.    Home Medications:  Current Outpatient Medications on File Prior to Visit   Medication Sig    Azelastine HCl 137 MCG/SPRAY solution by Each Nare route 2 (Two) Times a Day As Needed. USE 2 SPRAYS PER NOSTRIL TWICE A DAY AS NEEDED    cetirizine (zyrTEC) 10 MG tablet Take 1 tablet by mouth.    COENZYME Q-10 PO Take 1 capsule by mouth Daily.    dexlansoprazole (DEXILANT) 60 MG capsule Take 1 capsule by mouth Daily.    diazePAM (VALIUM) 2 MG tablet take 1 tablet (2 mg) by mouth every 8 (eight) hours if needed for anxiety.    dicyclomine (BENTYL) 10 MG capsule Take 1 capsule by mouth 4 (Four) Times a Day Before Meals & at Bedtime.    estradiol (ESTRACE) 0.1 MG/GM vaginal cream Insert 2 g into the vagina Daily.    famotidine (Pepcid) 20 MG tablet Take 1 tablet by mouth 2 (Two) Times a Day.    gabapentin (NEURONTIN) 100 MG capsule Take 4 capsules by mouth 4 (Four) Times a Day. As directed.    levothyroxine (SYNTHROID, LEVOTHROID) 75 MCG tablet Take 1 tablet by mouth Daily.    metoprolol succinate XL (TOPROL-XL) 50 MG 24 hr tablet Take 1 tablet by mouth Daily.    ondansetron (ZOFRAN) 4 MG tablet Take 1 tablet by mouth.    Senexon-S 8.6-50 MG per tablet     SUMAtriptan (IMITREX) 25 MG tablet Take 1  tablet by mouth 1 (One) Time As Needed for Migraine (migraine).    vitamin D3 125 MCG (5000 UT) capsule capsule Take 1 capsule by mouth Daily.     No current facility-administered medications on file prior to visit.       Past Medical History:   Diagnosis Date    Abnormal ECG 2/2/2024    Sinus tachycardia    Allergic     Allergic rhinitis 2002    Seasonal allergy symptoms all seasons    Arrhythmia     Arthritis     Bilateral occipital neuralgia 05/07/2024    Cancer 3/19    Adenocarcinoma in situ    Concussion 10/2023    L FACIAL INJURY foot    Difficulty walking 10/25    Dizziness October 26, 2023    Concussion    Endometriosis     Fracture of nasal bones 1996    Left side of bridge of nose    GERD (gastroesophageal reflux disease)     Headache     Headache, tension-type 10/25    History of medical problems     Hyperthyroidism     Memory loss 10/25    Migraine 10/25    Nosebleed 2002    Dry weather and seasonal changes    BALBINA (obstructive sleep apnea) 05/22/2024    Shingles     Syncope 10/25    Tinnitus October 26, 2023    Concussion       Allergies:  Allergies   Allergen Reactions    Lidocaine Hives, Nausea Only and Rash     Received occipital nerve block with lidocaine 5/15/24. Noticed rash the following day 5/16/24. More likely delayed hypersensitivity/contact dermatitis based on reaction, timing, and characteristics of lidocaine.    Fluoxetine Unknown - Low Severity    Amitriptyline Palpitations    Kenalog [Triamcinolone] Rash       Objective    Objective       Vitals:   Heart Rate:  [65] 65  BP: (97)/(56) 97/56  Body mass index is 23.7 kg/m².     PHYSICAL EXAM:    General Appearance:   well developed  well nourished  HENT:   oropharynx moist  lips not cyanotic  Neck:  thyroid not enlarged  supple  Respiratory:  no respiratory distress  normal breath sounds  no rales  Cardiovascular:  no jugular venous distention  regular rhythm  apical impulse normal  S1 normal, S2 normal  no S3, no S4   no murmur  no rub, no  thrill  carotid pulses normal; no bruit  pedal pulses normal  lower extremity edema: none    Skin:   warm, dry  Psychiatric:  judgement and insight appropriate  normal mood and affect        Result Review:  I have personally reviewed the available results from  [x]  Laboratory  [x]  EKG  [x]  Cardiology  [x]  Medications  [x]  Old records  []  Other:     Procedures    Results for orders placed during the hospital encounter of 05/01/24    Adult Transthoracic Echo Complete W/ Cont if Necessary Per Protocol    Interpretation Summary  Normal left ventricular systolic function.  No significant valve abnormalities noted.     Impression/Plan:  1.  Palpitations stable: Continue Toprol-XL 50 mg once a day.  Normal echo.           Austin Meredith MD   02/21/25   09:45 EST

## 2025-02-21 ENCOUNTER — OFFICE VISIT (OUTPATIENT)
Dept: CARDIOLOGY | Facility: CLINIC | Age: 45
End: 2025-02-21
Payer: COMMERCIAL

## 2025-02-21 VITALS
HEART RATE: 65 BPM | SYSTOLIC BLOOD PRESSURE: 97 MMHG | BODY MASS INDEX: 23.85 KG/M2 | HEIGHT: 62 IN | OXYGEN SATURATION: 99 % | DIASTOLIC BLOOD PRESSURE: 56 MMHG | WEIGHT: 129.6 LBS

## 2025-02-21 DIAGNOSIS — R00.2 PALPITATIONS: Primary | ICD-10-CM

## 2025-02-21 PROCEDURE — 99213 OFFICE O/P EST LOW 20 MIN: CPT | Performed by: SPECIALIST

## 2025-02-21 RX ORDER — CETIRIZINE HYDROCHLORIDE 10 MG/1
10 TABLET ORAL
COMMUNITY
Start: 2024-12-24 | End: 2025-03-24

## 2025-02-21 RX ORDER — DIAZEPAM 2 MG/1
TABLET ORAL
COMMUNITY
Start: 2025-01-09

## 2025-02-21 RX ORDER — DOCUSATE SODIUM 50MG AND SENNOSIDES 8.6MG 8.6; 5 MG/1; MG/1
TABLET, FILM COATED ORAL
COMMUNITY
Start: 2024-12-24

## 2025-02-21 RX ORDER — ONDANSETRON 4 MG/1
4 TABLET, FILM COATED ORAL
COMMUNITY
Start: 2024-12-27

## 2025-03-03 ENCOUNTER — TREATMENT (OUTPATIENT)
Dept: PHYSICAL THERAPY | Facility: CLINIC | Age: 45
End: 2025-03-03
Payer: OTHER MISCELLANEOUS

## 2025-03-03 DIAGNOSIS — R27.8 DECREASED COORDINATION: Primary | ICD-10-CM

## 2025-03-03 DIAGNOSIS — Z98.890 S/P CRANIOTOMY: Primary | ICD-10-CM

## 2025-03-03 DIAGNOSIS — Z74.09 IMPAIRED FUNCTIONAL MOBILITY, BALANCE, AND ENDURANCE: ICD-10-CM

## 2025-03-03 DIAGNOSIS — R29.898 WEAKNESS OF BOTH LOWER EXTREMITIES: ICD-10-CM

## 2025-03-03 DIAGNOSIS — M25.512 ACUTE PAIN OF LEFT SHOULDER: ICD-10-CM

## 2025-03-03 DIAGNOSIS — M62.81 MUSCLE WEAKNESS OF LEFT UPPER EXTREMITY: ICD-10-CM

## 2025-03-03 NOTE — PROGRESS NOTES
Occupational Therapy Initial Evaluation and Plan of Care  [place]  OT: [address]  [place] [address]    Patient: Monika Gu   : 1980  Diagnosis/ICD-10 Code:  Decreased coordination [R27.8]  Referring practitioner: Dillon Osorio MD  Date of Initial Visit: 3/3/2025  Today's Date: 3/3/2025  Patient seen for 1 sessions           Subjective Questionnaire: QuickDASH: 38/55      Subjective Evaluation    History of Present Illness  Date of onset: 10/25/2023  Date of surgery: 2024  Mechanism of injury: Pt is a LHD 43 y/o female. Pt presents to OT s/p infratemporal fossa craniotomy on 2024 to repair superior semicircular canal dehiscence. Prior to surgery patient received several months of OP PT for post concussive syndrome after suffering a concussion on 10/25/2023 at work. Pt had to spend longer in hospital than originally expected due to increase in nausea and vomiting, dizziness, double vision, and tinnitus. Pt did get discharged on 2024 to go home. Pt received home health therapy from  until last week. Pt has intermittent care as needed. Pt is using a quad cane for ambulation and has headphones in to aide with decreasing the sound. Pt reports since surgery she is completing ADL's with modification. Pt has a reacher, shower chair and multiple grab bars for bathing. Pt reports she cannot bend over, reach overhead, door due to increase pressure in her head. Pt reports if she turns her head too fast she will get dizzy. Pt states that the tinnitus is intermittent. Pt reports she is also having neuralgia pain on the L side of her head. Pt states she can also still hear her eye movements and heart beating. Pt is a psychologist.  PMHX: Hashimoto's, prior concussion, cancer (treated), tachycardia       Precautions and Work Restrictions: activity as toleratedPain  Current pain ratin  At best pain ratin  At worst pain ratin (L shoulder pain (0 at rest, 6-7/10 at  "worst))  Location: L side of head where the incision is  Quality: sharp, knife-like and burning (L shoulder - sharp)  Relieving factors: rest, heat and medications  Aggravating factors: overhead activity and outstretched reach  Progression: improved    Social Support  Lives with: alone    Hand dominance: left    Treatments  Previous treatment: physical therapy and speech therapy (OT)  Current treatment: physical therapy  Discharged from (in last 30 days): home health care  Patient Goals  Patient goals for therapy: decreased pain, increased strength, independence with ADLs/IADLs, return to sport/leisure activities and return to work  Patient goal: \"Increase my fine and gross motor, strength, endurance and decrease my pain\"         Objective          Neurological Testing     Sensation     Wrist/Hand   Left   Intact: light touch, kinesthesia and proprioception    Right   Intact: light touch, kinesthesia and proprioception    Additional Neurological Details  Pt reports no numbness/tingling in hands but does get it in her feet. Pt reports hypersensitivity to hot/cold in B hands.    Active Range of Motion   Left Shoulder   Flexion: WFL  Abduction: WFL and with pain  External rotation BTH: WFL  Internal rotation BTB: lumbar with pain    Left Elbow   Normal active range of motion    Right Elbow   Normal active range of motion    Left Wrist   Normal active range of motion    Right Wrist   Normal active range of motion    Left Thumb   Opposition: Pt able to oppose to base of small finger.    Right Thumb   Opposition: Pt able to oppose to base of small finger.    Additional Active Range of Motion Details  Pt able to make composite fist.    Strength/Myotome Testing     Left Wrist/Hand      (2nd hand position)   Left  strength (2nd hand position) 15 lbs    Right Wrist/Hand      (2nd hand position)   Right  strength (2nd hand position) 20 lbs    Additional Strength Details  Testing deferred secondary to reported " pain in incision with lifting.    Tests     Additional Tests Details  9 hole peg test  R: 36s  L: 30s          Assessment & Plan       Assessment  Impairments: abnormal or restricted ROM, activity intolerance, impaired physical strength, lacks appropriate home exercise program and pain with function   Functional limitations: carrying objects, lifting, sleeping, pulling, pushing and uncomfortable because of pain   Assessment details: Pt reports decreased FMC and strength. FMC is WNL per 9 hole peg test but OT will provide FMC HEP next visit. Pt is also seeing PT and will work on overall strengthening there. OT recommends 1 follow up to cover HEP and discharge. Will discuss with pt at next visit.  Prognosis: good    Goals  Plan Goals: 1. HEP   LTG 1:  1 week:  The patient will be independent with home exercises.     STATUS:  New   TREATMENT: Manual therapy, therapeutic exercise, home exercise instruction, and modalities as needed to include: electrical stimulation, ultrasound, moist heat, and ice.       Plan  Planned modality interventions: cryotherapy and thermotherapy (hydrocollator packs)  Planned therapy interventions: body mechanics training, fine motor coordination training, flexibility, functional ROM exercises, home exercise program, joint mobilization, manual therapy, neuromuscular re-education, postural training, soft tissue mobilization, strengthening, stretching and therapeutic activities  Frequency: 2x week  Duration in weeks: 12  Treatment plan discussed with: patient      Pt is indicated for skilled occupational therapy services.    Un-Timed:  Mod Eval     50     Mins  30159    Timed Treatment:   0   mins   Total Treatment:     50   mins    Start time: 1410  End time: 1500    OT SIGNATURE: Alexis Alba OT   DATE TREATMENT INITIATED: 3/3/2025  [place] License: [id number]    Initial Certification  Certification Period: 5/31/2025  I certify that the therapy services are furnished while this patient is under my  care.  The services outlined above are required by this patient, and will be reviewed every 90 days.     PHYSICIAN: Dillon Osorio MD      DATE:   MD NPI: [id number]  Please sign and return via fax to   [phone].. Thank you, [facility].

## 2025-03-03 NOTE — PROGRESS NOTES
Physical Therapy Initial Evaluation and Plan of Care  [address] [facility] [address] [place] [address]    Patient: Monika Gu   : 1980  Diagnosis/ICD-10 Code:  S/P craniotomy [Z98.890]  Referring practitioner: Dillon Osorio MD  Date of Initial Visit: 3/3/2025  Today's Date: 3/3/2025  Patient seen for 2 sessions           Subjective Questionnaire: LEFS:       Subjective Evaluation    History of Present Illness  Mechanism of injury: Pt presents to PT s/p infratemporal fossa craniotomy on 2024 to repair superior semicircular canal dehiscence. Prior to surgery patient received several months of OP PT for post concussive syndrome after suffering a concussion on 10/25/2023 at work. Pt had to spend longer in hospital than originally expected due to increase in nausea and vomiting, dizziness, double vision, and tinnitus. Pt did get discharged on 2024 to go home. Pt reports she has had her friend and son stay with her intermittently since surgery. Pt reports she is currently staying by herself, but her mom is coming next week to assist. Pt is using [employer] for transportation at this time. Pt reports she is more independent with ADL's, but does take increase in time to perform. Pt reports she has increase in pain in head and incision with bending over. Pt reports she is using grab bars in bathroom and a reacher for reaching for objects high and low. Pt reports she is able to now lift a 32 ounce cup, but unable to lift for long periods of time. Pt reports the diplopia and nystagmus have improved since initially after surgery, but does still have some issues. Pt reports she has been cleared to perform vestibular exercises. Pt reports her peripheral vision to the L is diminished. Pt is currently off work. Pt states on 2025 she will be having a hearing test performed. Pt reports she will eventually have to have surgery on the R side, but no date is set. Pt reports she has been performing HEP which  consists of arm exercises, hamstring curls, heel raises, hip abduction (standing), standing hip flexion, LAQ (1.5 pounds), side stepping, diagonal walking, and tandem balance.       Pt is using a quad cane now for community ambulation. Pt also uses quad cane in house except for in bedroom she reports she uses no AD. Pt reports she uses a walker on her bad days and has a walker upstairs and downstairs. Pt was discharged from home health last week. Pt reports she is trying to go longer without her headphones.       Patient Occupation: School psychologist Pain  Current pain rating: 3 (Head)  Quality: discomfort  Relieving factors: medications  Aggravating factors: movement, overhead activity, prolonged positioning, squatting, ambulation, standing, stairs and lifting    Treatments  Previous treatment: physical therapy and home therapy  Patient Goals  Patient goals for therapy: improved balance, decreased pain, increased strength, independence with ADLs/IADLs and return to work             Objective          Strength/Myotome Testing     Left Hip   Planes of Motion   Flexion: 4+  Abduction: 4+  Adduction: 4+    Right Hip   Planes of Motion   Flexion: 4  Abduction: 4  Adduction: 4    Left Knee   Flexion: 5  Extension: 5    Right Knee   Flexion: 5  Extension: 5    Left Ankle/Foot   Dorsiflexion: 5    Right Ankle/Foot   Dorsiflexion: 5    Ambulation     Observational Gait     Additional Observational Gait Details  Pt ambulates with quad cane with increase in base of support and decrease in balance. Pt ambulates with decrease in stride length.     Functional Assessment     Comments  30 second STS with no UE support: 6 times     30 second feet together: EO: 30 seconds  30 second feet together: EC: 30 seconds - significant increase in swaying  Tandem: R foot in front: 30 seconds                  L foot in front: 16 seconds increase in hip and ankle strategy  SLS: L LE: 30 seconds - increase in head pain           R LE: 16  seconds - fatigued - increase in hip and ankle strategy    Did not perform FGA or visual tracking due to patient being fatigued and having increase in pain in head after balance testing.         See Exercise, Manual, and Modality Logs for complete treatment.       Assessment & Plan       Assessment  Impairments: abnormal gait, abnormal or restricted ROM, activity intolerance, impaired balance, impaired physical strength, lacks appropriate home exercise program, pain with function, safety issue and weight-bearing intolerance   Functional limitations: lifting, walking, uncomfortable because of pain, standing, reaching overhead and unable to perform repetitive tasks   Assessment details: Pt presents to PT s/p infratemporal fossa craniotomy on 12/18/2024 to repair superior semicircular canal dehiscence. Pt has significant decrease in LE strength with R LE being weaker than L LE. Pt also requires use of quad cane for ambulation and has decrease in gait speed with ambulation. Pt has decrease in balance and overall decrease in functional mobility. Pt is currently not able to drive due to limitations. Testing was limited today due to patient's tolerance to activity, will perform further testing as patient's endurance and tolerance improves. Pt did have increase in pain in head with balance testing. Pt requires skilled PT in order to address deficits listed to return patient back to maximum function such as work. Patient will continue with addressing HEP that she has been performing at home.  Prognosis: good    Goals  Plan Goals: 1. Mobility: Walking/Moving Around Functional Limitation                               LTG 1: 12 weeks:  The patient will demonstrate TUG score <15 seconds for reduced fall risk.                           STATUS:  New              STG 1a: 6 weeks:  The patient will demonstrate TUG score <22 seconds for reduced fall risk.                           STATUS:  New        2. The patient has limited  strength of the B shoulders and B hips.              LTG 2: 12 weeks:  The patient will demonstrate 5 /5 strength for B hip flexion, abduction, and extension in order to improve hip stability.                          STATUS:  New              LTG 2a: 6 weeks:  The patient will demonstrate 4+ /5 strength for B hip flexion, abduction, and extension in order to improve hip stability.                          STATUS:  New                      3. The patient has gait dysfunction.              LTG 3: 12 weeks:  The patient will demonstrate > 24 / 30 on the Functional Gait Assessment for improved functional mobility.                           STATUS:  New              STG 3a: The patient will demonstrate > 18 / 30 on the Functional Gait Assessment for improved functional mobility.                           STATUS:  New                       4. The patient has difficulty with balance.               LTG 4: 12 weeks: The patient will hold the tandem position on stable surface with eyes closed for 30 seconds in order to improve balance and decrease risk of falling.                           STATUS: New               STG 4: 6 weeks: The patient will hold the feet together position on unstable surface with eyes open for 30 seconds in order to improve balance and decrease risk of falling.                           STATUS: New         5. The patient has gait dysfunction.                 LTG 3: 12 weeks: The patient will ambulate without assistive device, independently, for community distances in order to improve mobility and allow patient to perform activities such as grocery shopping with greater ease.              STATUS: New              LTG 3: 12 weeks: The patient will ambulate without assistive device, independently, for household distances in order to improve mobility and allow patient to perform activities such as ADL's with greater ease.              STATUS: New         Plan  Therapy options: will be seen for skilled  therapy services  Planned modality interventions: cryotherapy, TENS, electrical stimulation/Russian stimulation and thermotherapy (hydrocollator packs)  Planned therapy interventions: manual therapy, neuromuscular re-education, ADL retraining, balance/weight-bearing training, flexibility, functional ROM exercises, gait training, home exercise program, joint mobilization, soft tissue mobilization, strengthening, stretching, therapeutic activities, abdominal trunk stabilization, postural training and spinal/joint mobilization  Frequency: 2x week  Duration in weeks: 12  Treatment plan discussed with: patient        History # of Personal Factors and/or Comorbidities: HIGH (3+)  Examination of Body System(s): # of elements: HIGH (4+)  Clinical Presentation: STABLE   Clinical Decision Making: HIGH      Timed:         Manual Therapy:    0     mins  22876;     Therapeutic Exercise:    10     mins  69920;     Neuromuscular Go:    14    mins  25776;    Therapeutic Activity:     0     mins  64160;     Gait Trainin     mins  46637;     Ultrasound:     0     mins  60817;    Ionto                               0    mins   92164  Self pay                         0     mins [id number]    Un-Timed:  Electrical Stimulation:    0     mins  70255 ( )  Traction     0     mins 94561  Low Eval     0     Mins  34596  Mod Eval     0     Mins  72887  High Eval                       30     Mins  76312  Self Pay Eval                 0     PTSP1   Re-Eval                           0    mins  95594        Timed Treatment:   24   mins   Total Treatment:     54   mins    PT SIGNATURE: Electronically signed by Adan Portillo PT  [place] LICENSE: [id number]    DATE TREATMENT INITIATED: 3/3/2025    Initial Certification  Certification Period: 3/3/2025 thru 2025  I certify that the therapy services are furnished while this patient is under my care.  The services outlined above are required by this patient, and will be reviewed every  90 days.     PHYSICIAN: Dillon Osorio MD   NPI: [id number]      DATE:     Please sign and return via fax to [phone] Thank you, [facility].

## 2025-03-12 ENCOUNTER — TELEPHONE (OUTPATIENT)
Dept: NEUROLOGY | Facility: CLINIC | Age: 45
End: 2025-03-12

## 2025-03-12 ENCOUNTER — TREATMENT (OUTPATIENT)
Dept: PHYSICAL THERAPY | Facility: CLINIC | Age: 45
End: 2025-03-12
Payer: OTHER MISCELLANEOUS

## 2025-03-12 DIAGNOSIS — Z98.890 S/P CRANIOTOMY: ICD-10-CM

## 2025-03-12 DIAGNOSIS — R29.898 WEAKNESS OF BOTH LOWER EXTREMITIES: ICD-10-CM

## 2025-03-12 DIAGNOSIS — M62.81 MUSCLE WEAKNESS OF LEFT UPPER EXTREMITY: ICD-10-CM

## 2025-03-12 DIAGNOSIS — Z74.09 IMPAIRED FUNCTIONAL MOBILITY, BALANCE, AND ENDURANCE: Primary | ICD-10-CM

## 2025-03-12 DIAGNOSIS — R27.8 DECREASED COORDINATION: ICD-10-CM

## 2025-03-12 NOTE — PROGRESS NOTES
[facility]                              Physical Therapy Daily Treatment Note    Patient: Monika Gu   : 1980  Diagnosis/ICD-10 Code:  Impaired functional mobility, balance, and endurance [Z74.09]  Referring practitioner: Dillon Osorio MD  Date of Initial Visit: Type: THERAPY  Noted: 3/3/2025  Today's Date: 3/12/2025  Patient seen for 2 sessions           Subjective   Monika Gu reports: today is a hard day for her and she has been very emotional and cannot control it. Pt received a letter from work wanting a date on when she will return back to work without any restrictions. Pt states a goal she has is to be able to walk outside to get used to her surroundings with the different sounds and different terrains when walking.        Objective   Ambulate into clinic with quad cane.    See Exercise, Manual, and Modality Logs for complete treatment.     Assessment/Plan  Patient does require extended seated rest breaks during therapy. Pt was able to ride the LE bike for 10 mins this session, then after celebrating with how well she did she became very nauseated. Pt was then taken into treatment room and began breathing techniques with PTA to help with the nausea. After time spent resting, patient wanted to try more exercises. Pt was able to complete weighted seated LAQ before she became dizzy and nauseous again. Pt rested with water prior to returning to the [facility] waiting for her transportation. Pt does require SBA/ CGA when ambulating for safety. Pt also requires a quiet environment to keep from being overstimulated.    Progress per Plan of Care         Timed:  Manual Therapy:         mins  50319;  Therapeutic Exercise:    12     mins  89103;     Neuromuscular Go:        mins  28085;    Therapeutic Activity:     28     mins  84147;     Gait Training:           mins  41066;        Untimed:  Electrical Stimulation:         mins  41866 (  );  Mechanical Traction:         mins  12455;       Timed Treatment:   40   mins   Total Treatment:     40   mins      Electronically signed:     Janae Jose PTA  Physical Therapist Assistant  [place] License #: [id number]

## 2025-03-12 NOTE — TELEPHONE ENCOUNTER
Provider: CHUYITA     Caller: Monika Gu    Relationship to Patient: Self    Phone Number: [phone]    Reason for Call: PT IS NEEDING MORE INFORMATION REGARDING HER OCCIPITAL NEURALGIA DX FOR WORKERS [employer].  PROVIDER HAD WRITTEN A LETTER REGARDING THIS BUT THEY ARE NEEDING MORE INFORMATION. SHE WOULD LIKE A CALL BACK TO DISCUSS THIS FURTHER.    PLEASE REVIEW AND ADVISE     THANK YOU

## 2025-03-13 NOTE — TELEPHONE ENCOUNTER
THOM- let me know if you get a Cloudyn message regarding this    Spoke with patient , she reports that she received notification in the mail that work comp was needing more information regarding her occipital neuralgia from her ONB in May 2024. I asked pt to upload letter, as the visit they are inquiring about was several months ago, and pt was last seen in November.    Pt reports she hasn't been out much, d/t recent brain surgery, and has had a hard time recovering. If you request her to be seen, she'd be happy to see Dr. Loyd, however wonderdered if it could be telehealth.     I carina, reported once we get the letter we'll review it and reach out regarding next steps. She carina.

## 2025-03-14 ENCOUNTER — TELEPHONE (OUTPATIENT)
Age: 45
End: 2025-03-14

## 2025-03-14 NOTE — TELEPHONE ENCOUNTER
Caller: Riccardo Monika    Relationship: Self    Best call back number:     [phone]       What was the call regarding: PATIENT STATES THAT SHE HAD A WORK INJURY ON 10.25.23 THAT RESULTED IN A TRAUMATIC BRAIN INJURY AND SEVERAL OTHER HEALTH ISSUES.     SHE STATES THAT SHE WAS SEEN BY HER LAST PRIMARY CARE PROVIDER AND SEVERAL SPECIALISTS FOR THIS BUT WAS NEVER DIRECTED TO OCCUPATIONAL MEDICINE.     SHE STATES THAT BECAUSE SHE WAS ALREADY REFERRED TO SPECIALISTS THAT OCCUPATIONAL MEDICINE WILL NOT SEE HER FOR THIS WORKERS COMP CASE.     PATIENT STATES THAT NOW HER REGULAR INSURANCE IS SENDING BACK CLAIMS BECAUSE THEY ARE DUE TO THE WORK PLACE INJURY BUT WORKERS [employer] WILL NOT COVER ANYTHING EITHER BECAUSE IT WAS NOT CORRECTLY DOCUMENTED BY THE SPECIALISTS THAT IT WAS DUE TO THE WORK INJURY.     PATIENT STATES THAT SHE HAS REACHED OUT TO THE SPECIALISTS BUT WAS TOLD THAT THEY WILL NOT CHANGE THEIR NOTES TO DOCUMENT THAT HER HEALTH ISSUES ARE DUE TO THE WORK INJURY.     PATIENT STATES THAT SHE WOULD LIKE TO KNOW IF DR CHENG IS ABLE TO SEE HER FOR THE WORK INJURY AND IF HE CAN BILL HER WORKERS COMP.     PATIENT STATES THAT SHE WOULD LIKE TO KNOW IF THERE IS SOMEWHERE ELSE THAT SHE SHOULD GO BESIDES DR CHENG TO BE ABLE TO BE SEEN FOR HER WORK INJURY.     PATIENT WOULD LIKE A CALLBACK TO DISCUSS WHAT SHE NEEDS TO DO NOW.

## 2025-03-17 ENCOUNTER — TELEPHONE (OUTPATIENT)
Dept: SLEEP MEDICINE | Facility: HOSPITAL | Age: 45
End: 2025-03-17
Payer: COMMERCIAL

## 2025-03-17 NOTE — TELEPHONE ENCOUNTER
Office note received from [facility] from 3/14/25. I will scan into patient's chart at this time.

## 2025-03-19 ENCOUNTER — LAB (OUTPATIENT)
Dept: LAB | Facility: HOSPITAL | Age: 45
End: 2025-03-19
Payer: COMMERCIAL

## 2025-03-19 DIAGNOSIS — R00.2 PALPITATIONS: ICD-10-CM

## 2025-03-19 DIAGNOSIS — E03.4 HYPOTHYROIDISM DUE TO ACQUIRED ATROPHY OF THYROID: ICD-10-CM

## 2025-03-19 DIAGNOSIS — Z00.00 WELL ADULT EXAM: ICD-10-CM

## 2025-03-19 LAB
ALBUMIN SERPL-MCNC: 4.5 G/DL (ref 3.5–5.2)
ALBUMIN/GLOB SERPL: 1.4 G/DL
ALP SERPL-CCNC: 90 U/L (ref 39–117)
ALT SERPL W P-5'-P-CCNC: 19 U/L (ref 1–33)
ANION GAP SERPL CALCULATED.3IONS-SCNC: 11.1 MMOL/L (ref 5–15)
AST SERPL-CCNC: 24 U/L (ref 1–32)
BILIRUB SERPL-MCNC: 0.3 MG/DL (ref 0–1.2)
BUN SERPL-MCNC: 12 MG/DL (ref 6–20)
BUN/CREAT SERPL: 14 (ref 7–25)
CALCIUM SPEC-SCNC: 9.7 MG/DL (ref 8.6–10.5)
CHLORIDE SERPL-SCNC: 104 MMOL/L (ref 98–107)
CHOLEST SERPL-MCNC: 256 MG/DL (ref 0–200)
CO2 SERPL-SCNC: 25.9 MMOL/L (ref 22–29)
CREAT SERPL-MCNC: 0.86 MG/DL (ref 0.57–1)
EGFRCR SERPLBLD CKD-EPI 2021: 85 ML/MIN/1.73
GLOBULIN UR ELPH-MCNC: 3.3 GM/DL
GLUCOSE SERPL-MCNC: 108 MG/DL (ref 65–99)
HBA1C MFR BLD: 5.7 % (ref 4.8–5.6)
HCV AB SER QL: NORMAL
HDLC SERPL-MCNC: 51 MG/DL (ref 40–60)
LDLC SERPL CALC-MCNC: 187 MG/DL (ref 0–100)
LDLC/HDLC SERPL: 3.63 {RATIO}
MAGNESIUM SERPL-MCNC: 2.3 MG/DL (ref 1.6–2.6)
POTASSIUM SERPL-SCNC: 3.7 MMOL/L (ref 3.5–5.2)
PROT SERPL-MCNC: 7.8 G/DL (ref 6–8.5)
SODIUM SERPL-SCNC: 141 MMOL/L (ref 136–145)
TRIGL SERPL-MCNC: 100 MG/DL (ref 0–150)
TSH SERPL DL<=0.05 MIU/L-ACNC: 0.9 UIU/ML (ref 0.27–4.2)
VLDLC SERPL-MCNC: 18 MG/DL (ref 5–40)

## 2025-03-19 PROCEDURE — 84443 ASSAY THYROID STIM HORMONE: CPT

## 2025-03-19 PROCEDURE — 36415 COLL VENOUS BLD VENIPUNCTURE: CPT

## 2025-03-19 PROCEDURE — 83735 ASSAY OF MAGNESIUM: CPT

## 2025-03-19 PROCEDURE — 83036 HEMOGLOBIN GLYCOSYLATED A1C: CPT

## 2025-03-19 PROCEDURE — 86803 HEPATITIS C AB TEST: CPT

## 2025-03-19 PROCEDURE — 80053 COMPREHEN METABOLIC PANEL: CPT

## 2025-03-19 PROCEDURE — 80061 LIPID PANEL: CPT

## 2025-03-24 ENCOUNTER — OFFICE VISIT (OUTPATIENT)
Age: 45
End: 2025-03-24
Payer: COMMERCIAL

## 2025-03-24 VITALS
SYSTOLIC BLOOD PRESSURE: 100 MMHG | WEIGHT: 133 LBS | HEART RATE: 71 BPM | OXYGEN SATURATION: 98 % | BODY MASS INDEX: 24.48 KG/M2 | HEIGHT: 62 IN | DIASTOLIC BLOOD PRESSURE: 66 MMHG

## 2025-03-24 DIAGNOSIS — D50.9 IRON DEFICIENCY ANEMIA, UNSPECIFIED IRON DEFICIENCY ANEMIA TYPE: ICD-10-CM

## 2025-03-24 DIAGNOSIS — E78.2 MIXED HYPERLIPIDEMIA: ICD-10-CM

## 2025-03-24 DIAGNOSIS — R00.2 PALPITATIONS: ICD-10-CM

## 2025-03-24 DIAGNOSIS — R73.01 IMPAIRED FASTING GLUCOSE: ICD-10-CM

## 2025-03-24 DIAGNOSIS — E03.4 HYPOTHYROIDISM DUE TO ACQUIRED ATROPHY OF THYROID: Primary | ICD-10-CM

## 2025-03-24 DIAGNOSIS — K21.9 GASTROESOPHAGEAL REFLUX DISEASE, UNSPECIFIED WHETHER ESOPHAGITIS PRESENT: ICD-10-CM

## 2025-03-24 DIAGNOSIS — F07.81 POST CONCUSSIVE SYNDROME: ICD-10-CM

## 2025-03-24 PROCEDURE — 99214 OFFICE O/P EST MOD 30 MIN: CPT | Performed by: INTERNAL MEDICINE

## 2025-03-24 RX ORDER — FAMOTIDINE 20 MG/1
20 TABLET, FILM COATED ORAL 2 TIMES DAILY
Qty: 180 TABLET | Refills: 1 | Status: SHIPPED | OUTPATIENT
Start: 2025-03-24

## 2025-03-24 NOTE — ASSESSMENT & PLAN NOTE
Patient's fasting sugar was just mildly elevated around 108 and her A1c was ever so slightly elevated at 5.7.  Will check this again in about 6 months.

## 2025-03-24 NOTE — ASSESSMENT & PLAN NOTE
TSH is stable at 0.9 as of her 3/25 OV.  She is on 75 mcg daily, taking same dose every day, stable to continue same.

## 2025-03-24 NOTE — PROGRESS NOTES
"Chief Complaint  Follow-up (Pt states that she had  brain surgery and she had labs on 3/19, she is  needing to talk about her workers compensation. She has not seen you prior for this. )    Azul Gu presents to [facility]    Illness  Pertinent negatives include no abdominal pain, arthralgias, chest pain, congestion, coughing, fatigue or fever.     History of Present Illness:  Patient is an unfortunate 44-year-old female with history of TBI and resultant ataxia, auditory difficulties/sensitivities, panic attacks, also with underlying GERD and hypothyroidism, who was seen in 9/24 as a New Patient, and who is coming back now 3/25 for routine 6-month follow-up.  We will review her extensive med list, go over any recent labs she may have had, address her care gaps, and new concerns as time permits. (Telehealth counselor for \"PTSD\", televisits =  since not able to drive, still with attacks of vertigo, as of 3/25 OV)    Review of Systems   Constitutional:  Negative for appetite change, fatigue and fever.   HENT:  Negative for congestion and ear pain.    Eyes:  Negative for blurred vision.   Respiratory:  Negative for cough, chest tightness, shortness of breath and wheezing.    Cardiovascular:  Negative for chest pain, palpitations and leg swelling.   Gastrointestinal:  Negative for abdominal pain.   Genitourinary:  Negative for difficulty urinating, dysuria and hematuria.   Musculoskeletal:  Negative for arthralgias and gait problem.   Skin:  Negative for skin lesions.   Neurological:  Negative for syncope, memory problem and confusion.   Psychiatric/Behavioral:  Negative for self-injury and depressed mood.        Objective   Vital Signs:   /66   Pulse 71   Ht 157.5 cm (62.01\")   Wt 60.3 kg (133 lb)   SpO2 98%   BMI 24.32 kg/m²           Physical Exam  Vitals and nursing note reviewed.   Constitutional:       General: She is not in acute distress.     " Appearance: Normal appearance. She is not toxic-appearing.   HENT:      Head: Atraumatic.      Right Ear: External ear normal.      Left Ear: External ear normal.      Nose: Nose normal.      Mouth/Throat:      Mouth: Mucous membranes are moist.   Eyes:      General:         Right eye: No discharge.         Left eye: No discharge.      Extraocular Movements: Extraocular movements intact.      Pupils: Pupils are equal, round, and reactive to light.   Neck:      Comments: No carotid bruits.  Cardiovascular:      Rate and Rhythm: Normal rate and regular rhythm.      Pulses: Normal pulses.      Heart sounds: Normal heart sounds. No murmur heard.     No gallop.      Comments: Heart tones normal, no ectopy, no S3.  Pulmonary:      Effort: Pulmonary effort is normal. No respiratory distress.      Breath sounds: No wheezing, rhonchi or rales.      Comments: Lung fields clear bilaterally.  Abdominal:      General: There is no distension.      Palpations: Abdomen is soft. There is no mass.      Tenderness: There is no abdominal tenderness. There is no guarding.      Comments: No bruits.   Musculoskeletal:         General: No swelling or tenderness.      Cervical back: No tenderness.      Right lower leg: No edema.      Left lower leg: No edema.      Comments: No edema.   Skin:     General: Skin is warm and dry.      Findings: No rash.   Neurological:      General: No focal deficit present.      Mental Status: She is alert and oriented to person, place, and time. Mental status is at baseline.      Motor: No weakness.      Gait: Gait normal.   Psychiatric:         Mood and Affect: Mood normal.         Thought Content: Thought content normal.          Result Review   The following data was reviewed by: Endy Gustafson MD on 09/24/2024:  [x] Laboratory  [] Microbiology  [x] Radiology  [x] EKG/telemetry  [x] Cardiology/Vascular  [] Pathology  [x] Old records             Assessment and Plan   Diagnoses and all orders for this  visit:    1. Hypothyroidism due to acquired atrophy of thyroid (Primary)  Overview:  Treated since 2004.    Assessment & Plan:  TSH is stable at 0.9 as of her 3/25 OV.  She is on 75 mcg daily, taking same dose every day, stable to continue same.    Orders:  -     TSH; Future    2. Impaired fasting glucose  Assessment & Plan:  Patient's fasting sugar was just mildly elevated around 108 and her A1c was ever so slightly elevated at 5.7.  Will check this again in about 6 months.    Orders:  -     Hemoglobin A1c; Future    3. Mixed hyperlipidemia  Overview:  The 10-year ASCVD risk score (Anny ABRAMS, et al., 2019) is: 0.9%    Values used to calculate the score:      Age: 44 years      Sex: Female      Is Non- : No      Diabetic: No      Tobacco smoker: No      Systolic Blood Pressure: 100 mmHg      Is BP treated: No      HDL Cholesterol: 51 mg/dL      Total Cholesterol: 256 mg/dL     Patient's LDL cholesterol is markedly elevated at 187 as of her 3/25 OV, I do not have any comparison labs.  Otherwise low cardiac risks, recommend she look at some of the fats in her diet, we will check this again in 6 months as well.    Orders:  -     Comprehensive Metabolic Panel; Future  -     Lipid Panel; Future    4. Palpitations  Overview:  Echo 5/24:  Normal left ventricular systolic function.  No significant valve abnormalities noted.    48-hour Holter 3/24:  Within normal limits.    Assessment & Plan:  Patient is maintained on low-dose metoprolol, followed by Dr. Meredith, her blood pressure is low normal, but no symptoms this regards, her pulses in the 60 ballpark, stable to continue same.      5. Iron deficiency anemia, unspecified iron deficiency anemia type  -     CBC & Differential; Future  -     Ferritin; Future  -     Iron Profile; Future    6. Post concussive syndrome  Overview:  [facility] clinic note from 1/25 reviewed:   neurosurgery clinic for follow-up, she is status post left temporal  craniotomy with a middle fossa approach for repair of symptomatic superior semicircular kennel dehiscence and tegmen tympani defect for CSF leak prevention on December 18, 2024 by Dr. Hodge, in conjunction with Dr. Osorio.  At today's visit, she reports to be making slow progress postoperatively. After surgery, she had severe nausea, vomiting, and dizziness. Since coming home from the hospital, she has had clear left-sided nasal drainage that has a salty taste. She states that the drainage occurs daily, and is worse with head movement. She will have associated severe pressure to her face with the nasal drainage. She did have a migraine 2 days ago bed improved with sumatriptan. She denied ear and right near drainage. She has had double vision, and plans to see Ophthalmology on January 16, 2024.           BMI is within normal parameters. No other follow-up for BMI required.    Follow Up   No follow-ups on file.  Patient was given instructions and counseling regarding her condition or for health maintenance advice. Please see specific information pulled into the AVS if appropriate.     Total Time Spent:   minutes     This time includes time spent by me in the following activities: preparing for the visit, reviewing extensive past medical history and tests, performing a medically appropriate examination and/or evaluation, counseling and educating the patient and/or caregivers, ordering medications, tests, or procedures, referring and/or communicating with other health care professionals and documenting information in the medical record all on this date of service.

## 2025-03-24 NOTE — ASSESSMENT & PLAN NOTE
Patient is maintained on low-dose metoprolol, followed by Dr. Meredith, her blood pressure is low normal, but no symptoms this regards, her pulses in the 60 ballpark, stable to continue same.

## 2025-03-25 ENCOUNTER — TREATMENT (OUTPATIENT)
Dept: PHYSICAL THERAPY | Facility: CLINIC | Age: 45
End: 2025-03-25
Payer: OTHER MISCELLANEOUS

## 2025-03-25 DIAGNOSIS — Z98.890 S/P CRANIOTOMY: ICD-10-CM

## 2025-03-25 DIAGNOSIS — M25.512 ACUTE PAIN OF LEFT SHOULDER: ICD-10-CM

## 2025-03-25 DIAGNOSIS — R27.8 DECREASED COORDINATION: ICD-10-CM

## 2025-03-25 DIAGNOSIS — R29.898 WEAKNESS OF BOTH LOWER EXTREMITIES: ICD-10-CM

## 2025-03-25 DIAGNOSIS — Z74.09 IMPAIRED FUNCTIONAL MOBILITY, BALANCE, AND ENDURANCE: Primary | ICD-10-CM

## 2025-03-25 DIAGNOSIS — M62.81 MUSCLE WEAKNESS OF LEFT UPPER EXTREMITY: ICD-10-CM

## 2025-03-25 PROCEDURE — 97530 THERAPEUTIC ACTIVITIES: CPT | Performed by: PHYSICAL THERAPIST

## 2025-03-25 PROCEDURE — 97112 NEUROMUSCULAR REEDUCATION: CPT | Performed by: PHYSICAL THERAPIST

## 2025-03-25 PROCEDURE — 97110 THERAPEUTIC EXERCISES: CPT | Performed by: PHYSICAL THERAPIST

## 2025-03-25 NOTE — PROGRESS NOTES
"Physical Therapy Daily Treatment Note  [address] [place] [address]        Patient: Monika Gu   : 1980  Diagnosis/ICD-10 Code:  Impaired functional mobility, balance, and endurance [Z74.09]  Referring practitioner: Dillon Osorio MD  Date of Initial Visit: Type: THERAPY  Noted: 3/3/2025  Today's Date: 3/25/2025  Patient seen for 3 sessions             Subjective   Monika Gu reports having more \"Dizziness\" upon arrival today.  She states that she had appointment in [place] last week and states that she [place] [employer] [place].  She reports that these events make her very fatigued and requires her to rest for day afterward.  She reports that she is now seeing a Psychologist; which she reports seems to be helping.    Objective     Pt ambulates with SBQC with slow, guarded, and unsteady gait.  She exhibits decreased Balance especially with head turns or multi-directional movements.  Gait tends to deteriorate with visual or Auditory distraction.    She continues to be hypersensitive to visual and Auditory distractions.  Pt using Noise reducing ear pods management of sound in uncontrolled environment.    CGA with Gt belt used during functional activity performance in clinic and during transitional movements secondary to balance deficits and increased risk for falls.    See Exercise Logs for complete treatment.       Assessment/Plan    Pt tolerated therapy session moderately well today  with progression of therapeutic exercises and  Functional activities. She has improved, but continues to have significant deficits in Trunk and Bilateral Upper and Bilateral Lower extremity  Strength,Stability, Balance, and Functional tolerance; limiting functional mobility and ability to perform ADL and Work activities without increased pain or difficulty  at this time. Pt tolerated increase exercise and functional activity performance today as compared to previous sessions.   CGA wit Gait belt used " "during functional mobility activities in clinic today and when ambulating from clinic to car post session secondary to increase in \"Dizziness\" and more unsteady gait observed post session today.    Progress per Plan of Care- as tolerated.           Timed:  Manual Therapy:    0     mins  06421;  Therapeutic Exercise:    20     mins  33610;     Neuromuscular Go:    10    mins  50903;    Therapeutic Activity:     23     mins  96815;     Gait Trainin     mins  76954;     Ultrasound:     0     mins  87236;    Electrical Stimulation:    0     mins  07990;  Iontophoresis     0     mins  66951    Untimed:  Electrical Stimulation:    0     mins  41276 ( );  Mechanical Traction:    0     mins  62330;   Fluidotherapy     0     mins  09946  Hot pack     0     mins  61257  Cold pack     0     mins  98655    Timed Treatment:   53   mins   Total Treatment:     53   mins        Marisa Galaviz PTA  Physical Therapist Assistant  "

## 2025-03-27 ENCOUNTER — TREATMENT (OUTPATIENT)
Dept: PHYSICAL THERAPY | Facility: CLINIC | Age: 45
End: 2025-03-27
Payer: OTHER MISCELLANEOUS

## 2025-03-27 DIAGNOSIS — Z98.890 S/P CRANIOTOMY: ICD-10-CM

## 2025-03-27 DIAGNOSIS — Z74.09 IMPAIRED FUNCTIONAL MOBILITY, BALANCE, AND ENDURANCE: Primary | ICD-10-CM

## 2025-03-27 DIAGNOSIS — R29.898 WEAKNESS OF BOTH LOWER EXTREMITIES: ICD-10-CM

## 2025-03-27 DIAGNOSIS — M25.512 ACUTE PAIN OF LEFT SHOULDER: ICD-10-CM

## 2025-03-27 DIAGNOSIS — M62.81 MUSCLE WEAKNESS OF LEFT UPPER EXTREMITY: ICD-10-CM

## 2025-03-27 DIAGNOSIS — R27.8 DECREASED COORDINATION: ICD-10-CM

## 2025-03-27 NOTE — PROGRESS NOTES
Physical Therapy Daily Note  [address] [facility] [address] [place] [address]    VISIT#: 4    Subjective   Monika Gu reports she is feeling more dizzy today. Pt reports she had a really bad day yesterday and had to take the day off from exercising, but reports she has been exercising daily.       Objective     See Exercise, Manual, and Modality Logs for complete treatment.     Assessment/Plan    Continued with strengthening exercises today and patient did feel increase in pressure in head with bicep curls with 5 pounds, therefore decreased to 3 pounds and patient tolerated better. Initiated vestibular exercises today and patient able to perform with CGA with gait belt, but did require increase in breaks in between sets to recover. Pt had more increase in sway with vertical saccades compared to horizontal saccades. Pt did become dizzy several times with communication when patient had to look off to the side. Will continue to progress patient as able.       Progress per Plan of Care and Progress strengthening /stabilization /functional activity            Timed:                 Manual Therapy:    0     mins  63183;     Therapeutic Exercise:    24     mins  09222;     Neuromuscular Go:    23    mins  15606;    Therapeutic Activity:     8     mins  19186;     Gait Trainin     mins  24839;     Ultrasound:     0     mins  56649;    Ionto                               0    mins   87729  Self pay                         0     mins [email or web address]    Un-Timed:  Electrical Stimulation:    0     mins  13500 ( )  Canalith Repos    0     mins 80459  Dry Needling     0     mins self-pay  Traction     0     mins 53962    Timed Treatment:   55   mins   Total Treatment:     55   mins    Adan Portillo PT  Physical Therapist    PT SIGNATURE: Electronically signed by Adan Portillo PT  [place] LICENSE: [id number]

## 2025-04-03 ENCOUNTER — TREATMENT (OUTPATIENT)
Dept: PHYSICAL THERAPY | Facility: CLINIC | Age: 45
End: 2025-04-03
Payer: OTHER MISCELLANEOUS

## 2025-04-03 DIAGNOSIS — R29.898 WEAKNESS OF BOTH LOWER EXTREMITIES: ICD-10-CM

## 2025-04-03 DIAGNOSIS — M25.512 ACUTE PAIN OF LEFT SHOULDER: ICD-10-CM

## 2025-04-03 DIAGNOSIS — Z74.09 IMPAIRED FUNCTIONAL MOBILITY, BALANCE, AND ENDURANCE: Primary | ICD-10-CM

## 2025-04-03 DIAGNOSIS — Z98.890 S/P CRANIOTOMY: ICD-10-CM

## 2025-04-03 DIAGNOSIS — M62.81 MUSCLE WEAKNESS OF LEFT UPPER EXTREMITY: ICD-10-CM

## 2025-04-03 DIAGNOSIS — R27.8 DECREASED COORDINATION: ICD-10-CM

## 2025-04-03 NOTE — PROGRESS NOTES
Physical Therapy Daily Note  [address] [place] [address]    VISIT#: 5    Subjective   Monika Gu reports 6/10 pain. Pt reports the weather is really causing her to have increase in pain and dizziness today.      Objective     See Exercise, Manual, and Modality Logs for complete treatment.     Assessment/Plan    Progressed patient's balance and vestibular exercises today and patient tolerated well with reports of increase in dizziness, but recovered quickly with sitting rest breaks. Pt did require min/mod assist with standing balance and standing exercises and did have one loss of balance that required mod/max assist to correct. Will continue to progress patient as able.     Progress per Plan of Care and Progress strengthening /stabilization /functional activity          Timed:                 Manual Therapy:    0     mins  86690;     Therapeutic Exercise:    9     mins  99136;     Neuromuscular Go:    23    mins  85124;    Therapeutic Activity:     23     mins  45927;     Gait Training:      10     mins  07040;     Ultrasound:     0     mins  00127;    Ionto                               0    mins   01177  Self pay                         0     mins [email or web address]    Un-Timed:  Electrical Stimulation:    0     mins  43278 ( )  Canalith Repos    0     mins 53250  Dry Needling     0     mins self-pay  Traction     0     mins 17363    Timed Treatment:   65   mins   Total Treatment:     65   mins    Adan Portillo PT  Physical Therapist    PT SIGNATURE: Electronically signed by Adan Portillo PT  [place] LICENSE: [id number]

## 2025-04-04 NOTE — TELEPHONE ENCOUNTER
Hub staff attempted to follow warm transfer process and was unsuccessful     Caller: Monika Gu    Relationship to patient: Self    Best call back number: [phone]     Patient is needing: PATIENT STATES THAT SHE NEVER RECEIVED A CALLBACK REGARDING THIS ISSUE.     PATIENT STATES THAT SHE NEEDS TO SCHEDULE AN APPOINTMENT TO SEE DR CHENG TO DISCUSS [facility]ICAL jail BUT NEEDS IT TO BE BILLED THROUGH [facility] [facility].     [facility] UNABLE TO SCHEDULE [facility] APPOINTMENTS.     PATIENT WOULD LIKE A CALLBACK.

## 2025-04-08 ENCOUNTER — TREATMENT (OUTPATIENT)
Dept: PHYSICAL THERAPY | Facility: CLINIC | Age: 45
End: 2025-04-08
Payer: OTHER MISCELLANEOUS

## 2025-04-08 DIAGNOSIS — R27.8 DECREASED COORDINATION: ICD-10-CM

## 2025-04-08 DIAGNOSIS — Z74.09 IMPAIRED FUNCTIONAL MOBILITY, BALANCE, AND ENDURANCE: Primary | Chronic | ICD-10-CM

## 2025-04-08 DIAGNOSIS — M62.81 MUSCLE WEAKNESS OF LEFT UPPER EXTREMITY: ICD-10-CM

## 2025-04-08 DIAGNOSIS — R29.898 WEAKNESS OF BOTH LOWER EXTREMITIES: ICD-10-CM

## 2025-04-08 DIAGNOSIS — Z98.890 S/P CRANIOTOMY: Chronic | ICD-10-CM

## 2025-04-08 NOTE — PROGRESS NOTES
Progress Assessment  [address] [facility] [address] [place] [address]        Patient: Monika Gu   : 1980  Diagnosis/ICD-10 Code:  Impaired functional mobility, balance, and endurance [Z74.09]  Referring practitioner: Dillon Osorio MD  Date of Initial Visit: Type: THERAPY  Noted: 3/3/2025  Today's Date: 2025  Patient seen for 6 sessions      Subjective:   Monika Gu reports: 2/10  Subjective Questionnaire: LEFS:   Clinical Progress: improved  Home Program Compliance: Yes  Treatment has included: therapeutic exercise, neuromuscular re-education, manual therapy, therapeutic activity, and gait training    Subjective     Pt reports she is 30-40% better since starting PT. Pt reports she is still using a shower chair for bathing. Pt still uses grab bars in the bathroom. Pt uses ramp to enter house. Pt ambulates with quad cane and uses grabber to pick things up off of the floor due to increase in pressure in head with forward flexion. Pt is still unable to drive and uses grocery delivery and TACK to get to appointments. Pt still requires the use of family and friends for help with taking out trash, getting mail, doing laundry, and cleaning her house. Pt reports she is having a off day today and more dizzy.     Objective     Strength/Myotome Testing      Left Hip   Planes of Motion   Flexion: 4+  Abduction: 4+  Adduction: 4+     Right Hip   Planes of Motion   Flexion: 4+  Abduction: 4+  Adduction: 4+     Left Knee   Flexion: 5  Extension: 5     Right Knee   Flexion: 5  Extension: 5     Left Ankle/Foot   Dorsiflexion: 5     Right Ankle/Foot   Dorsiflexion: 5     Ambulation      Observational Gait      Additional Observational Gait Details  Pt ambulates with quad cane with increase in base of support and decrease in balance. Pt ambulates with decrease in stride length.      Functional Assessment      Comments  30 second STS with no UE support: 5 times      30 second feet together: EO: 30 seconds  30  second feet together: EC: 30 seconds - significant increase in swaying  Tandem: R foot in front: 30 seconds significant increase in hip and ankle strategy                 L foot in front: 30 seconds increase in hip and ankle strategy - increase in pressure in head  SLS: L LE: 30 seconds - increase in pressure in head - increase in hip and ankle strategy           R LE: 30 seconds - fatigued - increase in hip and ankle strategy - increase in pressure in head     FGA: 7/30    TUG: quad cane: 19 seconds - average of three trials    Assessment/Plan    Pt has made progress with improving strength and functional mobility since last progress note. Pt does continue to lack significant functional mobility such as requiring use of quad cane for ambulation, using public transportation for driving, and needing friends and family for assistance with laundry and cleaning. Pt requires skilled PT in order to address deficits listed to return patient back to maximum function such as work. Will continue to progress patient as able.       Goals  Plan Goals: 1. Mobility: Walking/Moving Around Functional Limitation                               LTG 1: 12 weeks:  The patient will demonstrate TUG score <15 seconds for reduced fall risk.                           STATUS:  IN PROGRESS              STG 1a: 6 weeks:  The patient will demonstrate TUG score <22 seconds for reduced fall risk.                           STATUS:  MET        2. The patient has limited strength of the B shoulders and B hips.              LTG 2: 12 weeks:  The patient will demonstrate 5 /5 strength for B hip flexion, abduction, and extension in order to improve hip stability.                          STATUS:  IN PROGRESS              LTG 2a: 6 weeks:  The patient will demonstrate 4+ /5 strength for B hip flexion, abduction, and extension in order to improve hip stability.                          STATUS:  MET                    3. The patient has gait dysfunction.               LTG 3: 12 weeks:  The patient will demonstrate > 24 / 30 on the Functional Gait Assessment for improved functional mobility.                           STATUS:  IN PROGRESS              STG 3a: The patient will demonstrate > 18 / 30 on the Functional Gait Assessment for improved functional mobility.                           STATUS:  IN PROGRESS                       4. The patient has difficulty with balance.               LTG 4: 12 weeks: The patient will hold the tandem position on stable surface with eyes closed for 30 seconds in order to improve balance and decrease risk of falling.                           STATUS: IN PROGRESS              STG 4: 6 weeks: The patient will hold the feet together position on unstable surface with eyes open for 30 seconds in order to improve balance and decrease risk of falling.                           STATUS: IN PROGRESS        5. The patient has gait dysfunction.                 LTG 3: 12 weeks: The patient will ambulate without assistive device, independently, for community distances in order to improve mobility and allow patient to perform activities such as grocery shopping with greater ease.              STATUS: IN PROGRESS              LTG 3: 12 weeks: The patient will ambulate without assistive device, independently, for household distances in order to improve mobility and allow patient to perform activities such as ADL's with greater ease.              STATUS: IN PROGRESS    Progress toward previous goals: Partially Met    See Exercise, Manual, and Modality Logs for complete treatment.         Recommendations: Continue as planned  Timeframe:2x a week for 3 months  Prognosis to achieve goals: fair    PT SIGNATURE: Electronically signed by Adan Portillo PT  [place] LICENSE: [id number]    Based upon review of the patient's progress and continued therapy plan, it is my medical opinion that Monika Gu should continue physical therapy treatment at [facility] THER  [facility] PHYSICAL THERAPY  [address]  [facility] [address]  [phone].      Timed:                 Manual Therapy:    0     mins  98014;     Therapeutic Exercise:    10     mins  45525;     Neuromuscular Go:    23    mins  48016;    Therapeutic Activity:     23     mins  11940;     Gait Trainin     mins  98897;     Ultrasound:     0     mins  58352;    Ionto                               0    mins   34047  Self pay                         0     mins [email or web address]    Un-Timed:  Electrical Stimulation:    0     mins  25936 ( )  Canalith Repos    0     mins 75667  Dry Needling     0     mins self-pay  Traction     0     mins 78006  Re-evaluation     8     mins 90566    Timed Treatment:   56   mins   Total Treatment:     64   mins      I certify that the therapy services are furnished while this patient is under my care.  The services outlined above are required by this patient, and will be reviewed every 90 days.

## 2025-04-09 ENCOUNTER — TELEPHONE (OUTPATIENT)
Dept: SLEEP MEDICINE | Facility: HOSPITAL | Age: 45
End: 2025-04-09
Payer: COMMERCIAL

## 2025-04-09 NOTE — TELEPHONE ENCOUNTER
Patient lvm asking for a medical statement showing that she receives treatment with our office, listing her diagnosis, her treatment options and her current status. She asks to also include that she was referred to [facility] and any diagnosis they may have given.  She stated the it is for her medical half-way benefit.     Patient asks to have paperwork complete by 4/18/25.

## 2025-04-10 ENCOUNTER — TREATMENT (OUTPATIENT)
Dept: PHYSICAL THERAPY | Facility: CLINIC | Age: 45
End: 2025-04-10
Payer: OTHER MISCELLANEOUS

## 2025-04-10 DIAGNOSIS — M62.81 MUSCLE WEAKNESS OF LEFT UPPER EXTREMITY: ICD-10-CM

## 2025-04-10 DIAGNOSIS — R27.8 DECREASED COORDINATION: ICD-10-CM

## 2025-04-10 DIAGNOSIS — M25.512 ACUTE PAIN OF LEFT SHOULDER: ICD-10-CM

## 2025-04-10 DIAGNOSIS — R29.898 WEAKNESS OF BOTH LOWER EXTREMITIES: ICD-10-CM

## 2025-04-10 DIAGNOSIS — Z98.890 S/P CRANIOTOMY: ICD-10-CM

## 2025-04-10 DIAGNOSIS — Z74.09 IMPAIRED FUNCTIONAL MOBILITY, BALANCE, AND ENDURANCE: Primary | ICD-10-CM

## 2025-04-10 NOTE — PROGRESS NOTES
"Physical Therapy Daily Treatment Note  [address] [place] [address]        Patient: Monika Gu   : 1980  Diagnosis/ICD-10 Code:  Impaired functional mobility, balance, and endurance [Z74.09]  Referring practitioner: Dillon Osorio MD  Date of Initial Visit: Type: THERAPY  Noted: 3/3/2025  Today's Date: 4/10/2025  Patient seen for 7 sessions             Subjective   Monika Gu reports having her baseline \"Dizziness\" today and persistent tightness and pain on her left side of her head, neck and arm upon arrival today.      Objective     Pt continues to be hypersensitive to visual and Auditory distractions.  Pt using Noise reducing ear pods for management of sound in uncontrolled environment in effort to avoid sensory overload.     CGA with Gt belt used during functional activity performance in clinic and during transitional movements secondary to balance deficits and increased risk for falls.     See Exercise Logs for complete treatment.        Assessment/Plan    Pt tolerated therapy session  well today  with progression of therapeutic exercises and  Functional activities. She has improved, but continues to have significant deficits in Trunk and Bilateral Upper and Bilateral Lower extremity  Strength,Stability, Balance, and Functional tolerance; limiting functional mobility and ability to perform ADL and Work activities without increased pain or difficulty  at this time.  Improved tolerance to increased exercise and functional activity performance today as compared to previous sessions.   CGA wit Gait belt used during functional mobility activities in clinic today and when ambulating secondary to increased risk for falls related to balance deficits.     Progress per Plan of Care- as tolerated.               Timed:  Manual Therapy:    0     mins  47364;  Therapeutic Exercise:    10     mins  08117;     Neuromuscular Go:    23    mins  19047;    Therapeutic Activity:     23     mins  " 89542;     Gait Trainin     mins  57983;     Ultrasound:     0     mins  69115;    Electrical Stimulation:    0     mins  33124;  Iontophoresis     0     mins  03973    Untimed:  Electrical Stimulation:    0     mins  32995 ( );  Mechanical Traction:    0     mins  63621;   Fluidotherapy     0     mins  93087  Hot pack     0     mins  63021  Cold pack     0     mins  19307    Timed Treatment:   56   mins   Total Treatment:     56   mins        Marisa Galaviz PTA  Physical Therapist Assistant

## 2025-04-15 ENCOUNTER — TREATMENT (OUTPATIENT)
Dept: PHYSICAL THERAPY | Facility: CLINIC | Age: 45
End: 2025-04-15
Payer: OTHER MISCELLANEOUS

## 2025-04-15 DIAGNOSIS — R29.898 WEAKNESS OF BOTH LOWER EXTREMITIES: ICD-10-CM

## 2025-04-15 DIAGNOSIS — Z74.09 IMPAIRED FUNCTIONAL MOBILITY, BALANCE, AND ENDURANCE: Primary | ICD-10-CM

## 2025-04-15 DIAGNOSIS — Z98.890 S/P CRANIOTOMY: ICD-10-CM

## 2025-04-15 DIAGNOSIS — R27.8 DECREASED COORDINATION: ICD-10-CM

## 2025-04-15 PROCEDURE — 97530 THERAPEUTIC ACTIVITIES: CPT | Performed by: PHYSICAL THERAPIST

## 2025-04-15 PROCEDURE — 97112 NEUROMUSCULAR REEDUCATION: CPT | Performed by: PHYSICAL THERAPIST

## 2025-04-15 PROCEDURE — 97110 THERAPEUTIC EXERCISES: CPT | Performed by: PHYSICAL THERAPIST

## 2025-04-15 NOTE — PROGRESS NOTES
Physical Therapy Daily Note  [address] [facility] [address] [place] [address]    VISIT#: 8    Subjective   Monika Gu reports she is feeling a little off today. Pt reports that they were mowing the grass around where she lives today and that has made her more symptomatic.       Objective     BP after standing exercise: 102/72    Assessment/Plan    Continued with progressing patient's exercises today and patient able to perform, but did become very symptomatic with standing diagonals. Pt reports feeling really dizzy and cold. Had patient sit and assessed BP that is listed above. Due to patient having a hard time regulating temperature throughout session with being hot and cold, performed primarily sitting exercises the end of session and patient able to complete, but was challenged secondary to weakness. Pt did have decrease in balance and gait speed with ambulation today in clinic. Will continue to progress patient as able.     Progress per Plan of Care and Progress strengthening /stabilization /functional activity            Timed:                 Manual Therapy:    0     mins  87554;     Therapeutic Exercise:    23     mins  19964;     Neuromuscular Go:    23    mins  93047;    Therapeutic Activity:     20     mins  78698;     Gait Trainin     mins  94028;     Ultrasound:     0     mins  36918;    Ionto                               0    mins   63982  Self pay                         0     mins [email or web address]    Un-Timed:  Electrical Stimulation:    0     mins  91199 ( )  Canalith Repos    0     mins 26863  Dry Needling     0     mins self-pay  Traction     0     mins 06088    Timed Treatment:   66   mins   Total Treatment:     66   mins    Adan Portillo PT  Physical Therapist    PT SIGNATURE: Electronically signed by Adan Portillo PT  [place] LICENSE: [id number]

## 2025-04-17 ENCOUNTER — TREATMENT (OUTPATIENT)
Dept: PHYSICAL THERAPY | Facility: CLINIC | Age: 45
End: 2025-04-17
Payer: OTHER MISCELLANEOUS

## 2025-04-17 DIAGNOSIS — M62.81 MUSCLE WEAKNESS OF LEFT UPPER EXTREMITY: ICD-10-CM

## 2025-04-17 DIAGNOSIS — M25.512 ACUTE PAIN OF LEFT SHOULDER: ICD-10-CM

## 2025-04-17 DIAGNOSIS — R29.898 WEAKNESS OF BOTH LOWER EXTREMITIES: ICD-10-CM

## 2025-04-17 DIAGNOSIS — R27.8 DECREASED COORDINATION: ICD-10-CM

## 2025-04-17 DIAGNOSIS — Z98.890 S/P CRANIOTOMY: ICD-10-CM

## 2025-04-17 DIAGNOSIS — Z74.09 IMPAIRED FUNCTIONAL MOBILITY, BALANCE, AND ENDURANCE: Primary | ICD-10-CM

## 2025-04-17 NOTE — PROGRESS NOTES
Physical Therapy Daily Note  [address] [facility] [address] [place] [address]    VISIT#: 2    Subjective   Monika Gu reports yesterday was a rough day after therapy on Tuesday. Pt reports she is doing better today and having her baseline dizziness today.         Objective     See Exercise, Manual, and Modality Logs for complete treatment.     Assessment/Plan    Continued to progress patient's vestibular exercises today and patient did have improvement to tolerance for exercises. Pt did require breaks between exercises, but most breaks were able to be performed in standing today. Pt does have increase in dizziness with exercises with increase in external noise or with visual disturbances like someone walking in the background of visual field. Pt requires continued skilled PT in order to address deficits. Will continue to progress patient as able.     Progress per Plan of Care and Progress strengthening /stabilization /functional activity            Timed:                 Manual Therapy:    0     mins  30194;     Therapeutic Exercise:    15     mins  13764;     Neuromuscular Go:    23    mins  16473;    Therapeutic Activity:     23     mins  84929;     Gait Trainin     mins  76658;     Ultrasound:     0     mins  55211;    Ionto                               0    mins   57836  Self pay                         0     mins [email or web address]    Un-Timed:  Electrical Stimulation:    0     mins  99453 ( )  Canalith Repos    0     mins 06035  Dry Needling     0     mins self-pay  Traction     0     mins 00120    Timed Treatment:   61   mins   Total Treatment:     61   mins    Adan Portillo PT  Physical Therapist    PT SIGNATURE: Electronically signed by Adan Portillo PT  [place] LICENSE: [id number]

## 2025-04-18 ENCOUNTER — TELEPHONE (OUTPATIENT)
Dept: SLEEP MEDICINE | Facility: HOSPITAL | Age: 45
End: 2025-04-18
Payer: COMMERCIAL

## 2025-04-18 NOTE — TELEPHONE ENCOUNTER
Contacted patient regarding letter of medical treatment. LVM for patient informing her that letter is available in her MyChart. I have also printed and it is available for her to  in office, when she is ready.

## 2025-04-18 NOTE — TELEPHONE ENCOUNTER
Patient contacted pur office today @ 8:08am stating there was information missing from paperwork. When I contacted her back she stated that it needs to include her full ss #. I informed patient that Dr. Altman was not in office until Thursday 4/24/25, and I could speak with  about revising letter to reflect. Also informed her that letter would have to picked up in office, when she ready for it. Patient verbalized understanding.

## 2025-04-22 ENCOUNTER — TREATMENT (OUTPATIENT)
Dept: PHYSICAL THERAPY | Facility: CLINIC | Age: 45
End: 2025-04-22
Payer: OTHER MISCELLANEOUS

## 2025-04-22 DIAGNOSIS — M62.81 MUSCLE WEAKNESS OF LEFT UPPER EXTREMITY: ICD-10-CM

## 2025-04-22 DIAGNOSIS — R29.898 WEAKNESS OF BOTH LOWER EXTREMITIES: ICD-10-CM

## 2025-04-22 DIAGNOSIS — Z74.09 IMPAIRED FUNCTIONAL MOBILITY, BALANCE, AND ENDURANCE: Primary | ICD-10-CM

## 2025-04-22 DIAGNOSIS — M25.512 ACUTE PAIN OF LEFT SHOULDER: ICD-10-CM

## 2025-04-22 DIAGNOSIS — R27.8 DECREASED COORDINATION: ICD-10-CM

## 2025-04-22 DIAGNOSIS — Z98.890 S/P CRANIOTOMY: ICD-10-CM

## 2025-04-22 PROCEDURE — 97110 THERAPEUTIC EXERCISES: CPT | Performed by: PHYSICAL THERAPIST

## 2025-04-22 PROCEDURE — 97112 NEUROMUSCULAR REEDUCATION: CPT | Performed by: PHYSICAL THERAPIST

## 2025-04-22 PROCEDURE — 97530 THERAPEUTIC ACTIVITIES: CPT | Performed by: PHYSICAL THERAPIST

## 2025-04-22 NOTE — PROGRESS NOTES
Physical Therapy Daily Note  [address] [facility] [address] [place] [address]    VISIT#: 3    Subjective   Monika Gu reports she was running late due to TAC being late. Pt reports she went to [place] yesterday to see her neurosurgeon and has been released from neurosurgeon.       Objective     See Exercise, Manual, and Modality Logs for complete treatment.     Assessment/Plan    Continued to progress patient's exercises today and patient tolerated better today. Due to patient having a good day, patient able to perform higher level exercises. Pt did have increase in symptoms with lateral step ups to the L compared to the R. Pt also became symptomatic with visual tracking of balloon exercise. Will continue to progress patient as able.     Progress per Plan of Care and Progress strengthening /stabilization /functional activity            Timed:                 Manual Therapy:    0     mins  27835;     Therapeutic Exercise:    8     mins  87205;     Neuromuscular Go:    23    mins  27732;    Therapeutic Activity:     9     mins  34888;     Gait Trainin     mins  41743;     Ultrasound:     0     mins  41259;    Ionto                               0    mins   05647  Self pay                         0     mins [email or web address]    Un-Timed:  Electrical Stimulation:    0     mins  27892 ( )  Canalith Repos    0     mins 12819  Dry Needling     0     mins self-pay  Traction     0     mins 79740    Timed Treatment:   40   mins   Total Treatment:     40   mins    Adan Portillo PT  Physical Therapist    PT SIGNATURE: Electronically signed by Adan Portillo PT  [place] LICENSE: [id number]

## 2025-04-23 ENCOUNTER — TELEPHONE (OUTPATIENT)
Dept: SLEEP MEDICINE | Facility: HOSPITAL | Age: 45
End: 2025-04-23
Payer: COMMERCIAL

## 2025-04-23 ENCOUNTER — OFFICE VISIT (OUTPATIENT)
Age: 45
End: 2025-04-23
Payer: OTHER MISCELLANEOUS

## 2025-04-23 VITALS
DIASTOLIC BLOOD PRESSURE: 72 MMHG | WEIGHT: 134.4 LBS | SYSTOLIC BLOOD PRESSURE: 122 MMHG | HEART RATE: 66 BPM | BODY MASS INDEX: 24.73 KG/M2 | OXYGEN SATURATION: 98 % | HEIGHT: 62 IN

## 2025-04-23 DIAGNOSIS — R00.2 PALPITATIONS: ICD-10-CM

## 2025-04-23 DIAGNOSIS — F07.81 POST CONCUSSIVE SYNDROME: ICD-10-CM

## 2025-04-23 DIAGNOSIS — H83.8X2 SUPERIOR SEMICIRCULAR CANAL DEHISCENCE SYNDROME OF LEFT EAR: Primary | ICD-10-CM

## 2025-04-23 NOTE — ASSESSMENT & PLAN NOTE
I reviewed the neurosurgery note above as well as recent therapy notes.  Patient discussed with me in detail regarding persistent disabilities and her current treatment protocol.

## 2025-04-23 NOTE — ASSESSMENT & PLAN NOTE
Dr Theodore Loyd is the [place] neurologist she is following with as well.  He has note from 11/24 is included under this heading.

## 2025-04-23 NOTE — TELEPHONE ENCOUNTER
Contacted patient to inform her that Dr. Altman has updated the physician's letter to include her SS#. Patient states she has access to print at home, so she will get that printed and sent in with her medical [employer] papers.

## 2025-04-23 NOTE — PROGRESS NOTES
"Chief Complaint  Follow-up (Pt is needing to talk about time line of her workers comp being intertwined with her FDC. They can't hold her position any longer and her only choose is to take medical FDC. )    Azul Gu presents to [facility]    Illness  Pertinent negatives include no abdominal pain, arthralgias, chest pain, congestion, coughing, fatigue or fever.     History of Present Illness:  Patient is an unfortunate 44-year-old female with history of TBI and resultant ataxia, auditory difficulties/sensitivities, panic attacks, also with underlying GERD and hypothyroidism, who was seen in 9/24 as a New Patient, and who is coming back now 3/25 for routine 6-month follow-up.  We will review her extensive med list, go over any recent labs she may have had, address her care gaps, and new concerns as time permits.   ---> Patient being seen in 4/25 for issues regarding inability to return to work.    (Telehealth counselor for \"PTSD\", televisits =  since not able to drive, still with attacks of vertigo, as of 3/25 OV)    Review of Systems   Constitutional:  Negative for appetite change, fatigue and fever.   HENT:  Negative for congestion and ear pain.    Eyes:  Negative for blurred vision.   Respiratory:  Negative for cough, chest tightness, shortness of breath and wheezing.    Cardiovascular:  Negative for chest pain, palpitations and leg swelling.   Gastrointestinal:  Negative for abdominal pain.   Genitourinary:  Negative for difficulty urinating, dysuria and hematuria.   Musculoskeletal:  Negative for arthralgias and gait problem.   Skin:  Negative for skin lesions.   Neurological:  Negative for syncope, memory problem and confusion.   Psychiatric/Behavioral:  Negative for self-injury and depressed mood.        Objective   Vital Signs:   /72   Pulse 66   Ht 157.5 cm (62.01\")   Wt 61 kg (134 lb 6.4 oz)   SpO2 98%   BMI 24.58 kg/m²   "         Physical Exam  Vitals and nursing note reviewed.   Constitutional:       General: She is not in acute distress.     Appearance: Normal appearance. She is not toxic-appearing.   HENT:      Head: Atraumatic.      Right Ear: External ear normal.      Left Ear: External ear normal.      Nose: Nose normal.      Mouth/Throat:      Mouth: Mucous membranes are moist.   Eyes:      General:         Right eye: No discharge.         Left eye: No discharge.      Extraocular Movements: Extraocular movements intact.      Pupils: Pupils are equal, round, and reactive to light.   Neck:      Comments: No carotid bruits.  Cardiovascular:      Rate and Rhythm: Normal rate and regular rhythm.      Pulses: Normal pulses.      Heart sounds: Normal heart sounds. No murmur heard.     No gallop.      Comments: Heart tones normal, no ectopy, no S3.  Pulmonary:      Effort: Pulmonary effort is normal. No respiratory distress.      Breath sounds: No wheezing, rhonchi or rales.      Comments: Lung fields clear bilaterally.  Abdominal:      General: There is no distension.      Palpations: Abdomen is soft. There is no mass.      Tenderness: There is no abdominal tenderness. There is no guarding.      Comments: No bruits.   Musculoskeletal:         General: No swelling or tenderness.      Cervical back: No tenderness.      Right lower leg: No edema.      Left lower leg: No edema.      Comments: No edema.   Skin:     General: Skin is warm and dry.      Findings: No rash.   Neurological:      General: No focal deficit present.      Mental Status: She is alert and oriented to person, place, and time. Mental status is at baseline.      Motor: No weakness.      Gait: Gait normal.   Psychiatric:         Mood and Affect: Mood normal.         Thought Content: Thought content normal.          Result Review   The following data was reviewed by: Endy Gustafson MD on 09/24/2024:  [x] Laboratory  [] Microbiology  [x] Radiology  [x] EKG/telemetry  [x]  Cardiology/Vascular  [] Pathology  [x] Old records             Assessment and Plan   Diagnoses and all orders for this visit:    1. 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 Hodge 4/21/2025:    Monika Gu is a 44 y.o. female s/p left temporal craniotomy with a middle fossa approach for repair of symptomatic superior semicircular canal dehiscence and tegmen tympani defect for CSF leak prevention on December 18, 2024 by Dr. Hodge, in conjunction with Dr. Osorio here for 3 month follow-up. At this time, she is doing well clinically, her cranial incision has healed well. She should continue follow-up with Dr. Osorio for continued management of symptoms. She does not need to follow up in the neurosurgery clinic unless her symptoms worsen or change. No further neurosurgical follow-up is needed at this time, patient can be seen on an as needed basis.     Assessment & Plan:  I reviewed the neurosurgery note above as well as recent therapy notes.  Patient discussed with me in detail regarding persistent disabilities and her current treatment protocol.      2. Palpitations  Overview:  Echo 5/24:  Normal left ventricular systolic function.  No significant valve abnormalities noted.    48-hour Holter 3/24:  Within normal limits.    Assessment & Plan:  Patient remains well maintained on low-dose metoprolol, also followed by Dr. Meredith, her pulses in the mid 60s, stable to continue same as of 4/25 OV.      3. Post concussive syndrome  Overview:   neurosurgery clinic note from 1/25 reviewed:   neurosurgery clinic for follow-up, she is status post left temporal craniotomy with a middle fossa approach for repair of symptomatic superior semicircular kennel dehiscence and tegmen tympani defect for CSF leak prevention on December 18, 2024 by Dr. Hodge, in conjunction with Dr. Osorio.  At today's visit, she reports to be making slow progress postoperatively.  After surgery, she had severe nausea, vomiting, and dizziness. Since coming home from the hospital, she has had clear left-sided nasal drainage that has a salty taste. She states that the drainage occurs daily, and is worse with head movement. She will have associated severe pressure to her face with the nasal drainage. She did have a migraine 2 days ago bed improved with sumatriptan. She denied ear and right near drainage. She has had double vision, and plans to see Ophthalmology on January 16, 2024.     Assessment & Plan:  Dr Theodore Loyd is the [facility], operated by [facility] neurologist she is following with as well.  He has note from 11/24 is included under this heading.            BMI is within normal parameters. No other follow-up for BMI required.    Follow Up   No follow-ups on file.  Patient was given instructions and counseling regarding her condition or for health maintenance advice. Please see specific information pulled into the AVS if appropriate.     Total Time Spent:   minutes     This time includes time spent by me in the following activities: preparing for the visit, reviewing extensive past medical history and tests, performing a medically appropriate examination and/or evaluation, counseling and educating the patient and/or caregivers, ordering medications, tests, or procedures, referring and/or communicating with other health care professionals and documenting information in the medical record all on this date of service.

## 2025-04-23 NOTE — ASSESSMENT & PLAN NOTE
Patient remains well maintained on low-dose metoprolol, also followed by Dr. Meredith, her pulses in the mid 60s, stable to continue same as of 4/25 OV.

## 2025-04-24 ENCOUNTER — TREATMENT (OUTPATIENT)
Dept: PHYSICAL THERAPY | Facility: CLINIC | Age: 45
End: 2025-04-24
Payer: OTHER MISCELLANEOUS

## 2025-04-24 DIAGNOSIS — R29.898 WEAKNESS OF BOTH LOWER EXTREMITIES: ICD-10-CM

## 2025-04-24 DIAGNOSIS — M25.512 ACUTE PAIN OF LEFT SHOULDER: ICD-10-CM

## 2025-04-24 DIAGNOSIS — R27.8 DECREASED COORDINATION: ICD-10-CM

## 2025-04-24 DIAGNOSIS — Z74.09 IMPAIRED FUNCTIONAL MOBILITY, BALANCE, AND ENDURANCE: Primary | ICD-10-CM

## 2025-04-24 DIAGNOSIS — M62.81 MUSCLE WEAKNESS OF LEFT UPPER EXTREMITY: ICD-10-CM

## 2025-04-24 DIAGNOSIS — Z98.890 S/P CRANIOTOMY: ICD-10-CM

## 2025-04-24 NOTE — PROGRESS NOTES
Physical Therapy Daily Note  [address] [place] [address]    VISIT#: 9    Subjective   Monika Gu reports she went to rehabilitation doctor today and reports she did get a referral for driving therapy in order to start working on returning back to driving. Pt reports the appointment was in [place] and she got up at 7:30 this morning, so it has been a long day.       Objective     See Exercise, Manual, and Modality Logs for complete treatment.     Assessment/Plan    Continued to progress patient's exercises to include more balance, vestibular, and visual scanning and patient tolerated well, but did become symptomatic with exercises. Pt required less time to recover and less sitting rest breaks. Will continue to progress patient as able and will add more quick reflex tasks and peripheral vision tasks. Pt requires CGA with all standing tasks due to decrease in balance.       Progress per Plan of Care and Progress strengthening /stabilization /functional activity            Timed:                 Manual Therapy:    0     mins  99349;     Therapeutic Exercise:    10     mins  44293;     Neuromuscular Go:    25    mins  18891;    Therapeutic Activity:     25     mins  91192;     Gait Trainin     mins  31188;     Ultrasound:     0     mins  71922;    Ionto                               0    mins   58728  Self pay                         0     mins [email or web address]    Un-Timed:  Electrical Stimulation:    0     mins  00106 ( )  Canalith Repos    0     mins 50857  Dry Needling     0     mins self-pay  Traction     0     mins 22808    Timed Treatment:   60   mins   Total Treatment:     60   mins    Adan Portillo PT  Physical Therapist    PT SIGNATURE: Electronically signed by Adan Portillo PT  [place] LICENSE: [id number]

## 2025-04-29 ENCOUNTER — TREATMENT (OUTPATIENT)
Dept: PHYSICAL THERAPY | Facility: CLINIC | Age: 45
End: 2025-04-29
Payer: OTHER MISCELLANEOUS

## 2025-04-29 DIAGNOSIS — Z98.890 S/P CRANIOTOMY: ICD-10-CM

## 2025-04-29 DIAGNOSIS — R27.8 DECREASED COORDINATION: ICD-10-CM

## 2025-04-29 DIAGNOSIS — R29.898 WEAKNESS OF BOTH LOWER EXTREMITIES: ICD-10-CM

## 2025-04-29 DIAGNOSIS — Z74.09 IMPAIRED FUNCTIONAL MOBILITY, BALANCE, AND ENDURANCE: Primary | ICD-10-CM

## 2025-04-29 DIAGNOSIS — M25.512 ACUTE PAIN OF LEFT SHOULDER: ICD-10-CM

## 2025-04-29 DIAGNOSIS — M62.81 MUSCLE WEAKNESS OF LEFT UPPER EXTREMITY: ICD-10-CM

## 2025-04-29 NOTE — PROGRESS NOTES
Physical Therapy Daily Note  [address] [place] [address]    VISIT#: 10    Subjective   Monika Gu reports 3/10 pain on L side of head and L shoulder.       Objective     See Exercise, Manual, and Modality Logs for complete treatment.     Assessment/Plan    Continued to progress all of patient's strengthening, balance, and vestibular exercises today. Performed an exercise that required reaction time, depth perception, and coordination and patient able to perform but became very dizzy after exercise was over and required rest break. Pt did have increase in difficulty with grasping a ball today, therefore checked  strength and  strength was 35 pounds on both sides. This is an improvement from when patient was assessed by OT. Pt did require CGA to ambulate to vehicle at end of session due to being significantly fatigued and having increase in dizziness. Will continue to progress patient as able.     Progress per Plan of Care and Progress strengthening /stabilization /functional activity            Timed:                 Manual Therapy:    0     mins  26888;     Therapeutic Exercise:    14     mins  04930;     Neuromuscular Go:    23    mins  73315;    Therapeutic Activity:     23     mins  76905;     Gait Trainin     mins  64506;     Ultrasound:     0     mins  51467;    Ionto                               0    mins   80713  Self pay                         0     mins [email or web address]    Un-Timed:  Electrical Stimulation:    0     mins  58012 ( )  Canalith Repos    0     mins 39390  Dry Needling     0     mins self-pay  Traction     0     mins 84261    Timed Treatment:   60   mins   Total Treatment:     60   mins    Adan Portillo PT  Physical Therapist    PT SIGNATURE: Electronically signed by Adan Portillo PT  [place] LICENSE: [id number]

## 2025-05-01 ENCOUNTER — TREATMENT (OUTPATIENT)
Dept: PHYSICAL THERAPY | Facility: CLINIC | Age: 45
End: 2025-05-01
Payer: OTHER MISCELLANEOUS

## 2025-05-01 DIAGNOSIS — R29.898 WEAKNESS OF BOTH LOWER EXTREMITIES: ICD-10-CM

## 2025-05-01 DIAGNOSIS — Z98.890 S/P CRANIOTOMY: ICD-10-CM

## 2025-05-01 DIAGNOSIS — Z74.09 IMPAIRED FUNCTIONAL MOBILITY, BALANCE, AND ENDURANCE: Primary | ICD-10-CM

## 2025-05-01 DIAGNOSIS — M25.512 ACUTE PAIN OF LEFT SHOULDER: ICD-10-CM

## 2025-05-01 DIAGNOSIS — R27.8 DECREASED COORDINATION: ICD-10-CM

## 2025-05-01 DIAGNOSIS — M62.81 MUSCLE WEAKNESS OF LEFT UPPER EXTREMITY: ICD-10-CM

## 2025-05-01 NOTE — PROGRESS NOTES
Physical Therapy Daily Note  [address] [place] [address]    VISIT#: 11    Subjective   Monika Gu reports she was really tired after last therapy session. Pt reports she took all day yesterday to recover from therapy session. Pt reports she cannot have driving assessment until done with PT.       Objective     See Exercise, Manual, and Modality Logs for complete treatment.     Assessment/Plan    Continued to progress patient's strengthening and balance exercises today and patient tolerated, but continues to become dizzy with higher level of exercises. Had patient perform balance exercises outside today without headphones in order for patient to become acclimated to sounds outside and patient able to complete with CGA. Pt did become very dizzy once inside and required increase in rest break to recover. Will continue to progress patient as able.    Progress per Plan of Care and Progress strengthening /stabilization /functional activity            Timed:                 Manual Therapy:    0     mins  58849;     Therapeutic Exercise:    23     mins  49311;     Neuromuscular Go:    23    mins  35729;    Therapeutic Activity:     23     mins  38752;     Gait Trainin     mins  65993;     Ultrasound:     0     mins  27586;    Ionto                               0    mins   72491  Self pay                         0     mins [email or web address]    Un-Timed:  Electrical Stimulation:    0     mins  87532 ( )  Canalith Repos    0     mins 12145  Dry Needling     0     mins self-pay  Traction     0     mins 14721    Timed Treatment:   69   mins   Total Treatment:     69   mins    Adan Portillo PT  Physical Therapist    PT SIGNATURE: Electronically signed by Adan Portillo PT  [place] LICENSE: [id number]

## 2025-05-05 ENCOUNTER — TELEPHONE (OUTPATIENT)
Age: 45
End: 2025-05-05

## 2025-05-05 NOTE — TELEPHONE ENCOUNTER
Caller: Monika Gu    Relationship: Self    Best call back number:  Telephone Information:   Mobile [phone]         What is the best time to reach you: ANYTIME     Who are you requesting to speak with (clinical staff, provider,  specific staff member): CLINICAL         What was the call regarding:        THE PATIENT IS REQUESTING A CALL BACK REGARDING A LETTER FOR HER  DISABILITY USP   SHE SAID SHE IS NEEDING IT BACK BY WEDNESDAY OR THURSDAY OF THIS WEEK

## 2025-05-12 ENCOUNTER — TREATMENT (OUTPATIENT)
Dept: PHYSICAL THERAPY | Facility: CLINIC | Age: 45
End: 2025-05-12
Payer: OTHER MISCELLANEOUS

## 2025-05-12 DIAGNOSIS — M62.81 MUSCLE WEAKNESS OF LEFT UPPER EXTREMITY: ICD-10-CM

## 2025-05-12 DIAGNOSIS — F07.81 POST CONCUSSIVE SYNDROME: ICD-10-CM

## 2025-05-12 DIAGNOSIS — M25.512 ACUTE PAIN OF LEFT SHOULDER: ICD-10-CM

## 2025-05-12 DIAGNOSIS — Z98.890 S/P CRANIOTOMY: ICD-10-CM

## 2025-05-12 DIAGNOSIS — R29.898 WEAKNESS OF BOTH LOWER EXTREMITIES: ICD-10-CM

## 2025-05-12 DIAGNOSIS — R27.8 DECREASED COORDINATION: ICD-10-CM

## 2025-05-12 DIAGNOSIS — Z74.09 IMPAIRED FUNCTIONAL MOBILITY, BALANCE, AND ENDURANCE: Primary | ICD-10-CM

## 2025-05-12 RX ORDER — METOPROLOL SUCCINATE 50 MG/1
50 TABLET, EXTENDED RELEASE ORAL DAILY
Qty: 90 TABLET | Refills: 0 | Status: SHIPPED | OUTPATIENT
Start: 2025-05-12

## 2025-05-12 NOTE — PROGRESS NOTES
Progress Assessment  [address] [place] [address]        Patient: Monika Gu   : 1980  Diagnosis/ICD-10 Code:  Impaired functional mobility, balance, and endurance [Z74.09]  Referring practitioner: Dillon Osorio MD  Date of Initial Visit: Type: THERAPY  Noted: 3/3/2025  Today's Date: 2025  Patient seen for 12 sessions      Subjective:   Monika Gu reports: 1/10 pain   Subjective Questionnaire: LEFS:   Clinical Progress: improved  Home Program Compliance: Yes  Treatment has included: therapeutic exercise, neuromuscular re-education, manual therapy, therapeutic activity, and gait training    Subjective     Pt reports she is having a day with increase in dizziness. Pt reports the changes in bariatric pressure are really impacting her dizziness.     Objective     Strength/Myotome Testing      Left Hip   Planes of Motion   Flexion: 4+  Abduction: 4+  Adduction: 4+     Right Hip   Planes of Motion   Flexion: 4+  Abduction: 4+  Adduction: 4+     Left Knee   Flexion: 5  Extension: 5     Right Knee   Flexion: 5  Extension: 5     Left Ankle/Foot   Dorsiflexion: 5     Right Ankle/Foot   Dorsiflexion: 5     Ambulation      Observational Gait      Additional Observational Gait Details  Pt ambulates with quad cane with increase in base of support and decrease in balance. Pt ambulates with decrease in stride length.      Functional Assessment      Comments  5 time STS: 22 seconds     Unable to assess balance testing today (feet together, tandem, and SLS) due to patient becoming diaphoretic with FGA testing.       FGA: 15/30     TUG: 15 seconds with no AD- average of three trials - CGA with gait belt      Assessment/Plan    Despite patient having increase in dizziness today, patient did demonstrate an improvement in strength and functional testing today. Pt continues to improve on strength. All strength testing has to be performed in sitting due to patient unable to lie in supine or  sidelying due to increase in dizziness and increase in pressure in head. Pt did improve with STS testing and TUG testing. Pt able to perform TUG without quad cane, but continues to require CGA with gait belt for safety. At the end of FGA testing, patient become diaphoretic, had patient sit and BP was 98/63 and HR was 61 bpm. Had patient sit for long period of time to recover, had patient walk after patient had recovered and again she became diaphoretic, BP  was 103/83 HR: 69 bpm. Therefore did not continue with further testing at this time. Pt does still require skilled PT in order to address deficits listed above in order to be able to return patient back to maximum function such as walking without a quad cane and being able to go to the store independently. Will continue to progress patient as able.    Goals  Plan Goals: 1. Mobility: Walking/Moving Around Functional Limitation                               LTG 1: 12 weeks:  The patient will demonstrate TUG score <13 seconds for reduced fall risk.                           STATUS:  MET - PROGRESSED              STG 1a: 6 weeks:  The patient will demonstrate TUG score <22 seconds for reduced fall risk.                           STATUS:  MET        2. The patient has limited strength of the B shoulders and B hips.              LTG 2: 12 weeks:  The patient will demonstrate 5 /5 strength for B hip flexion, abduction, and extension in order to improve hip stability.                          STATUS:  IN PROGRESS              LTG 2a: 6 weeks:  The patient will demonstrate 4+ /5 strength for B hip flexion, abduction, and extension in order to improve hip stability.                          STATUS:  MET                    3. The patient has gait dysfunction.              LTG 3: 12 weeks:  The patient will demonstrate > 24 / 30 on the Functional Gait Assessment for improved functional mobility.                           STATUS:  IN PROGRESS              STG 3a: The patient  will demonstrate > 18 / 30 on the Functional Gait Assessment for improved functional mobility.                           STATUS: IN PROGRESS                       4. The patient has difficulty with balance.               LTG 4: 12 weeks: The patient will hold the tandem position on stable surface with eyes closed for 30 seconds in order to improve balance and decrease risk of falling.                           STATUS: IN PROGRESS              STG 4: 6 weeks: The patient will hold the feet together position on unstable surface with eyes open for 30 seconds in order to improve balance and decrease risk of falling.                           STATUS: IN PROGRESS        5. The patient has gait dysfunction.                 LTG 3: 12 weeks: The patient will ambulate without assistive device, independently, for community distances in order to improve mobility and allow patient to perform activities such as grocery shopping with greater ease.              STATUS: IN PROGRESS              LTG 3: 12 weeks: The patient will ambulate without assistive device, independently, for household distances in order to improve mobility and allow patient to perform activities such as ADL's with greater ease.              STATUS: IN PROGRESS    Progress toward previous goals: Partially Met    See Exercise, Manual, and Modality Logs for complete treatment.         Recommendations: Continue as planned  Timeframe:2x a week for 3 months  Prognosis to achieve goals: good    PT SIGNATURE: Electronically signed by Adan Portillo, ALEXANDER  [place] LICENSE: [id number]    Based upon review of the patient's progress and continued therapy plan, it is my medical opinion that Monika Gu should continue physical therapy treatment at [facility]  [address] [place] [id number] [phone].      Timed:                 Manual Therapy:    0     mins  62666;     Therapeutic Exercise:    8     mins  09813;      Neuromuscular Go:    10    mins  42318;    Therapeutic Activity:     27     mins  74724;     Gait Trainin     mins  98102;     Ultrasound:     0     mins  75878;    Ionto                               0    mins   92068  Self pay                         0     mins [email or web address]    Un-Timed:  Electrical Stimulation:    0     mins  00667 ( )  Canalith Repos    0     mins 82349  Dry Needling     0     mins self-pay  Traction     0     mins 07481  Re-evaluation     8     mins 10545    Timed Treatment:   45   mins   Total Treatment:     53   mins      I certify that the therapy services are furnished while this patient is under my care.  The services outlined above are required by this patient, and will be reviewed every 90 days.

## 2025-05-15 ENCOUNTER — TREATMENT (OUTPATIENT)
Dept: PHYSICAL THERAPY | Facility: CLINIC | Age: 45
End: 2025-05-15
Payer: OTHER MISCELLANEOUS

## 2025-05-15 DIAGNOSIS — M25.512 ACUTE PAIN OF LEFT SHOULDER: ICD-10-CM

## 2025-05-15 DIAGNOSIS — Z74.09 IMPAIRED FUNCTIONAL MOBILITY, BALANCE, AND ENDURANCE: Primary | ICD-10-CM

## 2025-05-15 DIAGNOSIS — M62.81 MUSCLE WEAKNESS OF LEFT UPPER EXTREMITY: ICD-10-CM

## 2025-05-15 DIAGNOSIS — R29.898 WEAKNESS OF BOTH LOWER EXTREMITIES: ICD-10-CM

## 2025-05-15 DIAGNOSIS — Z98.890 S/P CRANIOTOMY: ICD-10-CM

## 2025-05-15 DIAGNOSIS — R27.8 DECREASED COORDINATION: ICD-10-CM

## 2025-05-15 NOTE — PROGRESS NOTES
Physical Therapy Daily Note  [address] [facility] [address] [place] [address]    VISIT#: 13    Subjective   Monika Gu reports 3/10 pain today. Pt reports she feels like a migraine is coming on.       Objective     115/73 HR: 59 bpm - at start of therapy session    Assessment/Plan    Continued to progress patient's exercises today to include more peripheral vision, scanning, dual tasking while ambulating and/or balancing on higher level balance objects and patient able to complete with either CGA or supervision with no LOB. Pt does continue to become dizzy with activities, but rest breaks in between activities due to dizziness were shorter today. Pt did have decrease in peripheral vision on L side versus R side. Will continue to progress patient as able.       Progress per Plan of Care and Progress strengthening /stabilization /functional activity            Timed:                 Manual Therapy:    0     mins  49379;     Therapeutic Exercise:    14     mins  02090;     Neuromuscular Go:    23    mins  70253;    Therapeutic Activity:     23     mins  59902;     Gait Trainin     mins  89980;     Ultrasound:     0     mins  72615;    Ionto                               0    mins   10137  Self pay                         0     mins [email or web address]    Un-Timed:  Electrical Stimulation:    0     mins  56297 ( )  Canalith Repos    0     mins 78547  Dry Needling     0     mins self-pay  Traction     0     mins 41270    Timed Treatment:   60   mins   Total Treatment:     60   mins    Adan Portillo PT  Physical Therapist    PT SIGNATURE: Electronically signed by Adan Portillo PT  [place] LICENSE: [id number]

## 2025-05-16 ENCOUNTER — DOCUMENTATION (OUTPATIENT)
Dept: PHYSICAL THERAPY | Facility: CLINIC | Age: 45
End: 2025-05-16
Payer: COMMERCIAL

## 2025-05-16 RX ORDER — DEXLANSOPRAZOLE 60 MG/1
1 CAPSULE, DELAYED RELEASE ORAL DAILY
Qty: 90 CAPSULE | Refills: 1 | Status: SHIPPED | OUTPATIENT
Start: 2025-05-16

## 2025-05-16 NOTE — PROGRESS NOTES
Discharge Summary  Discharge Summary from Occupational Therapy Report  Juan  OT: [address] [place] [address]    Dates  OT visit: 3/3/25  Number of Visits: 1     Discharge Status of Patient: See Eval Note dated 3/3/25    Goals:  discontinued    Discharge Plan: Continue with current home exercise program as instructed    Comments Auth denied    Date of Discharge 3/3/25        Alexis Alba OT  Occupational Therapist  [place] License:[id number]

## 2025-05-22 ENCOUNTER — TREATMENT (OUTPATIENT)
Dept: PHYSICAL THERAPY | Facility: CLINIC | Age: 45
End: 2025-05-22
Payer: OTHER MISCELLANEOUS

## 2025-05-22 DIAGNOSIS — R27.8 DECREASED COORDINATION: ICD-10-CM

## 2025-05-22 DIAGNOSIS — Z98.890 S/P CRANIOTOMY: ICD-10-CM

## 2025-05-22 DIAGNOSIS — R29.898 WEAKNESS OF BOTH LOWER EXTREMITIES: ICD-10-CM

## 2025-05-22 DIAGNOSIS — Z74.09 IMPAIRED FUNCTIONAL MOBILITY, BALANCE, AND ENDURANCE: Primary | ICD-10-CM

## 2025-05-22 DIAGNOSIS — M25.512 ACUTE PAIN OF LEFT SHOULDER: ICD-10-CM

## 2025-05-22 DIAGNOSIS — M62.81 MUSCLE WEAKNESS OF LEFT UPPER EXTREMITY: ICD-10-CM

## 2025-05-22 PROCEDURE — 97530 THERAPEUTIC ACTIVITIES: CPT | Performed by: PHYSICAL THERAPIST

## 2025-05-22 PROCEDURE — 97112 NEUROMUSCULAR REEDUCATION: CPT | Performed by: PHYSICAL THERAPIST

## 2025-05-22 NOTE — PROGRESS NOTES
Physical Therapy Daily Note  [address] [facility] [address] [place] [address]    VISIT#: 14    Subjective   Monika Gu reports she started a new medication that she reports has significantly helped with her sleep and with her dizziness. Pt reports she is feeling a lot better. Pt reports she does still have dizziness, but the intensity isn't as bad.       Objective     See Exercise, Manual, and Modality Logs for complete treatment.     Assessment/Plan    Continued to progress patient's exercises and patient tolerated well. Pt was able to perform exercises outside with lots of noise with cars driving by, without headphones on, with significantly less dizziness. Pt was able to perform turns with walking today without having to pause to recover from dizziness. Pt does still require use of gait belt and CGA with higher level activities without use of cane, but patient did not lose balance today with exercises. At end of exercises, patient was feeling increase in tightness in upper trap and levator and reports it was starting to cause her head to hurt, therefore performed manual therapy to aide in relieving the pain. Pt does still tend to increase scapular elevation with higher level exercises which increase tightness in upper trap. Will continue to progress patient as able.       Progress per Plan of Care and Progress strengthening /stabilization /functional activity            Timed:                 Manual Therapy:    8     mins  09507;     Therapeutic Exercise:    15     mins  83991;     Neuromuscular Go:    23    mins  20780;    Therapeutic Activity:     25     mins  50868;     Gait Trainin     mins  40900;     Ultrasound:     0     mins  59358;    Ionto                               0    mins   16790  Self pay                         0     mins [email or web address]    Un-Timed:  Electrical Stimulation:    0     mins  36373 ( )  Canalith Repos    0     mins 46460  Dry Needling     0     mins self-pay  Traction      0     mins [id number]    Timed Treatment:   71   mins   Total Treatment:     71   mins    Adan Portillo PT  Physical Therapist    PT SIGNATURE: Electronically signed by Adan Portillo PT  [place] LICENSE: [id number]

## 2025-05-27 ENCOUNTER — TREATMENT (OUTPATIENT)
Dept: PHYSICAL THERAPY | Facility: CLINIC | Age: 45
End: 2025-05-27
Payer: OTHER MISCELLANEOUS

## 2025-05-27 DIAGNOSIS — R27.8 DECREASED COORDINATION: ICD-10-CM

## 2025-05-27 DIAGNOSIS — Z98.890 S/P CRANIOTOMY: ICD-10-CM

## 2025-05-27 DIAGNOSIS — M25.512 ACUTE PAIN OF LEFT SHOULDER: ICD-10-CM

## 2025-05-27 DIAGNOSIS — R29.898 WEAKNESS OF BOTH LOWER EXTREMITIES: ICD-10-CM

## 2025-05-27 DIAGNOSIS — Z74.09 IMPAIRED FUNCTIONAL MOBILITY, BALANCE, AND ENDURANCE: Primary | ICD-10-CM

## 2025-05-27 NOTE — PROGRESS NOTES
Physical Therapy Daily Note  [address] [facility] [address] [place] [address]    VISIT#: 15    Subjective   Monika Gu reports she has had a busy day today and although feeling dizzy, has been able to complete several tasks. Pt used Lyft to arrive to therapy today.       Objective     See Exercise, Manual, and Modality Logs for complete treatment.     Assessment/Plan    Continued to progress patient's strengthening, functional mobility, coordination exercises and patient tolerated well. Pt continues to be challenged with high level exercises that require dual tasking. Pt still requires break to recover from exercises, although breaks are becoming shorter. Overall, patient continues to make great progress. Will continue to progress patient as able.       Progress per Plan of Care and Progress strengthening /stabilization /functional activity            Timed:                 Manual Therapy:    0     mins  40819;     Therapeutic Exercise:    10     mins  30220;     Neuromuscular Go:    25    mins  37827;    Therapeutic Activity:     25     mins  63847;     Gait Trainin     mins  70646;     Ultrasound:     0     mins  63388;    Ionto                               0    mins   51821  Self pay                         0     mins [email or web address]    Un-Timed:  Electrical Stimulation:    0     mins  64293 ( )  Canalith Repos    0     mins 18973  Dry Needling     0     mins self-pay  Traction     0     mins 53859    Timed Treatment:   60   mins   Total Treatment:     60   mins    Adan Portillo PT  Physical Therapist    PT SIGNATURE: Electronically signed by Adan Portillo PT  [place] LICENSE: [id number]

## 2025-05-30 ENCOUNTER — TREATMENT (OUTPATIENT)
Dept: PHYSICAL THERAPY | Facility: CLINIC | Age: 45
End: 2025-05-30
Payer: OTHER MISCELLANEOUS

## 2025-05-30 DIAGNOSIS — Z74.09 IMPAIRED FUNCTIONAL MOBILITY, BALANCE, AND ENDURANCE: Primary | ICD-10-CM

## 2025-05-30 DIAGNOSIS — R27.8 DECREASED COORDINATION: ICD-10-CM

## 2025-05-30 DIAGNOSIS — M25.512 ACUTE PAIN OF LEFT SHOULDER: ICD-10-CM

## 2025-05-30 DIAGNOSIS — R29.898 WEAKNESS OF BOTH LOWER EXTREMITIES: ICD-10-CM

## 2025-05-30 DIAGNOSIS — M62.81 MUSCLE WEAKNESS OF LEFT UPPER EXTREMITY: ICD-10-CM

## 2025-05-30 DIAGNOSIS — Z98.890 S/P CRANIOTOMY: ICD-10-CM

## 2025-06-05 ENCOUNTER — TELEMEDICINE (OUTPATIENT)
Dept: SLEEP MEDICINE | Facility: HOSPITAL | Age: 45
End: 2025-06-05

## 2025-06-05 DIAGNOSIS — G25.81 RESTLESS LEGS SYNDROME (RLS): ICD-10-CM

## 2025-06-05 DIAGNOSIS — F07.81 POST CONCUSSIVE SYNDROME: ICD-10-CM

## 2025-06-05 DIAGNOSIS — F51.01 PRIMARY INSOMNIA: Primary | ICD-10-CM

## 2025-06-05 RX ORDER — GABAPENTIN 100 MG/1
400 CAPSULE ORAL 4 TIMES DAILY
Qty: 360 CAPSULE | Refills: 1 | Status: SHIPPED | OUTPATIENT
Start: 2025-06-13

## 2025-06-05 NOTE — PROGRESS NOTES
Follow Up Sleep Disorders Center Note     Chief Complaint:  BALBINA    The provider is located in [place] using a secure MyChart Video Visit through [employer]. Patient is being seen remotely via telehealth at their home address in [place], and stated that they are in a secure environment for this session. The patient's condition being diagnosed/treated is appropriate for telemedicine. The provider has identified herself as well as her credentials. The patient and/or patient's guardian, consent to be seen remotely, and when consent is given they understand that the consent allows for patient identifiable information to be sent to a third party as needed. They may refuse to be seen remotely at any time. The electronic data is encrypted and password protected, and the patient and/or guardian has been advised of the potential risks to privacy not withstanding such measures.   PT identifiers used: Name and .     You have chosen to receive care through a telehealth visit. Do you consent to use a video/audio connection for your medical care today? Yes.    Primary Care Physician: Endy Gustafson MD    Interval History:   The patient is a 44 y.o. female  who was last seen in the sleep lab: 12/3/24. Patient of Dr. Lopez's. Presents today for follow-up on insomnia and restless leg syndrome. PSG was done in 2024 which showed poor sleep efficiency, AHI of 0.2 lowest SpO2 92%. At last visit patient was advised to increase gabapentin to 300 mg nightly to help with insomnia/restless leg syndrome. Insomnia thought to be secondary to postconcussive syndrome.  At last visit discussed okay to titrate gabapentin up to 400 mg nightly; of note she does take metoprolol at night as well for elevated heart rate; patient aware to continue to space out by about 2 hours. We discussed concern for risk for CNS depression; sit up in bed for 2 or 3 minutes before getting up out of bed to avoid orthostatic hypotension. Refill order placed today. Patient was  also amenable to CBT-I for primary insomnia. Referral was order placed for this today to [facility].    Presents today for 6-month follow-up.  Tobin reviewed.  Gabapentin last dispensed 5/16/2025.  Reports gabapentin working well for her.  PMR doctor prescribed trazodone which is helped her sleep.  Had several complications after her craniotomy and working to get back to her baseline.    Review of Systems:    A complete review of systems was done and all were negative with the exception of negative    Social History:    Social History     Socioeconomic History    Marital status: Single   Tobacco Use    Smoking status: Never     Passive exposure: Past    Smokeless tobacco: Never   Vaping Use    Vaping status: Never Used   Substance and Sexual Activity    Alcohol use: Not Currently    Drug use: Never    Sexual activity: Not Currently     Partners: Male       Allergies:  Lidocaine, Lorazepam, Fluoxetine, Amitriptyline, Duloxetine, and Kenalog [triamcinolone]     Medication Review:  Reviewed.      Impression:   1. Primary insomnia    2. Post concussive syndrome    3. Restless legs syndrome (RLS)        Continue gabapentin 400 mg nightly.  Refill order placed today dated for June 13, 2025.  Tobin reviewed.  Patient will continue to separate when she takes gabapentin metoprolol and trazodone.  Advised to sit up in bed for couple minutes for getting up out of bed.  Continue care per [facility] as it is helping.    Good sleep hygiene measures should be maintained.      I answered all of the patient's questions.  The patient will call for any problems and will follow up in 6 months at [place] office.      Ciaran Altman MD  Sleep Medicine  06/05/25  11:18 EDT

## 2025-06-06 ENCOUNTER — OFFICE VISIT (OUTPATIENT)
Dept: NEUROLOGY | Facility: CLINIC | Age: 45
End: 2025-06-06
Payer: OTHER MISCELLANEOUS

## 2025-06-06 VITALS
SYSTOLIC BLOOD PRESSURE: 118 MMHG | DIASTOLIC BLOOD PRESSURE: 64 MMHG | WEIGHT: 134 LBS | HEART RATE: 84 BPM | HEIGHT: 62 IN | BODY MASS INDEX: 24.66 KG/M2 | OXYGEN SATURATION: 98 %

## 2025-06-06 DIAGNOSIS — H83.8X2 SUPERIOR SEMICIRCULAR CANAL DEHISCENCE SYNDROME OF LEFT EAR: ICD-10-CM

## 2025-06-06 DIAGNOSIS — F07.81 POST CONCUSSIVE SYNDROME: Primary | ICD-10-CM

## 2025-06-06 PROBLEM — G47.33 OSA (OBSTRUCTIVE SLEEP APNEA): Status: RESOLVED | Noted: 2024-05-22 | Resolved: 2025-06-06

## 2025-06-06 RX ORDER — OMEPRAZOLE 40 MG/1
40 CAPSULE, DELAYED RELEASE ORAL
COMMUNITY

## 2025-06-06 RX ORDER — TRAZODONE HYDROCHLORIDE 50 MG/1
TABLET ORAL
COMMUNITY

## 2025-06-06 RX ORDER — OXCARBAZEPINE 150 MG/1
150 TABLET, FILM COATED ORAL 2 TIMES DAILY
Qty: 60 TABLET | Refills: 2 | Status: SHIPPED | OUTPATIENT
Start: 2025-06-06
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
## 2025-06-10 ENCOUNTER — TREATMENT (OUTPATIENT)
Dept: PHYSICAL THERAPY | Facility: CLINIC | Age: 45
End: 2025-06-10
Payer: OTHER MISCELLANEOUS

## 2025-06-10 DIAGNOSIS — R29.898 WEAKNESS OF BOTH LOWER EXTREMITIES: ICD-10-CM

## 2025-06-10 DIAGNOSIS — M62.81 MUSCLE WEAKNESS OF LEFT UPPER EXTREMITY: ICD-10-CM

## 2025-06-10 DIAGNOSIS — R27.8 DECREASED COORDINATION: ICD-10-CM

## 2025-06-10 DIAGNOSIS — M25.512 ACUTE PAIN OF LEFT SHOULDER: ICD-10-CM

## 2025-06-10 DIAGNOSIS — Z98.890 S/P CRANIOTOMY: ICD-10-CM

## 2025-06-10 DIAGNOSIS — Z74.09 IMPAIRED FUNCTIONAL MOBILITY, BALANCE, AND ENDURANCE: Primary | ICD-10-CM

## 2025-06-10 PROCEDURE — 97110 THERAPEUTIC EXERCISES: CPT | Performed by: PHYSICAL THERAPIST

## 2025-06-10 PROCEDURE — 97530 THERAPEUTIC ACTIVITIES: CPT | Performed by: PHYSICAL THERAPIST

## 2025-06-10 NOTE — PROGRESS NOTES
Physical Therapy Daily Note  [address] [place] [address]    VISIT#: [id number]    Subjective   Monika Gu reports pain in the back of her head. Pt reports she did overdo things yesterday trying to do laundry. Pt reports she has been prescribed oxcarbazepine which is a mood stabilizer and nerve pain medication. She is also getting referred to psychiatry and psychology.    Objective     See Exercise, Manual, and Modality Logs for complete treatment.     Assessment/Plan    Continued to progress patient's exercises and patient able to perform recumbent exercises for first time today. Pt required increase in time in between exercises due to increase pressure in head. Pt reports no increase in pain, but did report pressure. Pt reports at night time when patient is supine and she turns her head to the L she feels like she does when she has a nystagmus. Assessed patient today and patient had very small left upbeating nystagmus. Once patient was sitting from being recumbent, she did have increase in taste of blood in her mouth, no blood was present. Pt has made progress with improving balance exercises and still requires CGA for safety. Discussed with patient today that she can start weaning off of cane for ambulation.        Progress per Plan of Care and Progress strengthening /stabilization /functional activity            Timed:                 Manual Therapy:    0     mins  17997;     Therapeutic Exercise:    23     mins  11648;     Neuromuscular Go:    12    mins  90323;    Therapeutic Activity:     23     mins  44782;     Gait Trainin     mins  07473;     Ultrasound:     0     mins  02529;    Ionto                               0    mins   20666  Self pay                         0     mins [id number]    Un-Timed:  Electrical Stimulation:    0     mins  62527 ( )  Canalith Repos    0     mins 35148  Dry Needling     0     mins self-pay  Traction     0     mins 16223    Timed Treatment:   58    mins   Total Treatment:     58   mins    Adan Portillo PT  Physical Therapist    PT SIGNATURE: Electronically signed by Adan Portillo PT  [place] LICENSE: [id number]

## 2025-06-11 RX ORDER — ESTRADIOL 0.1 MG/G
CREAM VAGINAL
Qty: 42.5 G | Refills: 3 | Status: SHIPPED | OUTPATIENT
Start: 2025-06-11

## 2025-06-13 ENCOUNTER — TELEPHONE (OUTPATIENT)
Dept: NEUROLOGY | Facility: CLINIC | Age: 45
End: 2025-06-13
Payer: OTHER MISCELLANEOUS

## 2025-06-13 DIAGNOSIS — F07.81 POST CONCUSSIVE SYNDROME: Primary | ICD-10-CM

## 2025-06-13 NOTE — TELEPHONE ENCOUNTER
Caller: Monika Gu    Relationship: Self    Best call back number: [phone]     What is the best time to reach you: ANYTIME    Who are you requesting to speak with (clinical staff, provider,  specific staff member): ANGELITA    What was the call regarding: SHE HAS SOME QUESTIONS ABOUT HER REFERRAL FOR THERAPY AND REQUESTS A CALL BACK     PLEASE ADVISE  THANK YOU

## 2025-06-17 ENCOUNTER — TREATMENT (OUTPATIENT)
Dept: PHYSICAL THERAPY | Facility: CLINIC | Age: 45
End: 2025-06-17
Payer: OTHER MISCELLANEOUS

## 2025-06-17 DIAGNOSIS — M25.512 ACUTE PAIN OF LEFT SHOULDER: ICD-10-CM

## 2025-06-17 DIAGNOSIS — Z98.890 S/P CRANIOTOMY: ICD-10-CM

## 2025-06-17 DIAGNOSIS — Z74.09 IMPAIRED FUNCTIONAL MOBILITY, BALANCE, AND ENDURANCE: Primary | ICD-10-CM

## 2025-06-17 DIAGNOSIS — R29.898 WEAKNESS OF BOTH LOWER EXTREMITIES: ICD-10-CM

## 2025-06-17 DIAGNOSIS — M62.81 MUSCLE WEAKNESS OF LEFT UPPER EXTREMITY: ICD-10-CM

## 2025-06-17 DIAGNOSIS — R27.8 DECREASED COORDINATION: ICD-10-CM

## 2025-06-17 NOTE — PROGRESS NOTES
Re-Assessment / Re-Certification  [address] [facility] [address] [facility] [address] [place] [address]      Patient: Monika Gu   : 1980  Diagnosis/ICD-10 Code:  Impaired functional mobility, balance, and endurance [Z74.09]  Referring practitioner: Dillon Osorio MD  Date of Initial Visit: Type: THERAPY  Noted: 3/3/2025  Today's Date: 2025  Patient seen for 18 sessions      Subjective:   Monika Gu reports: 3/10  Subjective Questionnaire: LEFS:   Clinical Progress: improved  Home Program Compliance: Yes  Treatment has included: therapeutic exercise, neuromuscular re-education, manual therapy, therapeutic activity, and gait training    Subjective     Pt presents to PT today for the first time since surgery for a morning session. Pt reports that she is having a harder time today due to being in the morning. Pt reports she continues to have increase in pressure with bending over. Pt states she has been able to walk around her house without her cane and presents to PT today without her cane.     Objective     Strength/Myotome Testing      Left Hip   Planes of Motion   Flexion: 4+  Abduction: 4+  Adduction: 4+     Right Hip   Planes of Motion   Flexion: 4+  Abduction: 4+  Adduction: 4+     Left Knee   Flexion: 5  Extension: 5     Right Knee   Flexion: 5  Extension: 5     Left Ankle/Foot   Dorsiflexion: 5     Right Ankle/Foot   Dorsiflexion: 5    Right Shoulder    Flexion: 4  Abduction: 4-  ER: 4-  IR: 4  Elbow flexion: 4  Elbow extension: 4    Left Shoulder    Flexion: 4  Abduction: 4-  ER: 4-  IR: 4  Elbow flexion: 4  Elbow extension: 4     Ambulation      Observational Gait      Additional Observational Gait Details  Pt ambulates with no AD with increase in base of support and decrease in balance. Pt ambulates with decrease in stride length.      Functional Assessment      Comments  5 time STS: 18.8 seconds     Feet together EO: 30 seconds  Feet together EC: 13 seconds before requiring CGA  Tandem R foot in front:  23 seconds   Tandem L foot in front: 17 seconds  SL: 6 seconds each side    FGA: unable to perform today due to patient becoming very dizzy and losing significant balance attempting first part of test. Pt required assistance to treatment table to sit down and increase in time to recover.      TU.2 seconds with no AD- average of three trials - CGA with gait belt       Assessment/Plan    Pt has made progress with improving functional mobility in the last month. Pt is now able to ambulate without AD at home and in clinic. Pt still requires CGA with balance exercises due to decrease in balance. Pt has made progress with improving LE strength. Able to assess UE strength for the first time since IE due to patient able to tolerate resistance to UE's. Pt decreased time with TUG today. Attempted FGA, but patient became very dizzy with first task and required assistance to table to sit down and prolonged time to recover. Therefore did not complete this test. Pt requires skilled PT in order to address deficits listed to return patient back to maximum function such as driving, walking without an AD with decrease risk for falls, and being able to walk through stores to shop. Will continue to progress patient as able.       Goals  Plan Goals: 1. Mobility: Walking/Moving Around Functional Limitation                               LTG 1: 12 weeks:  The patient will demonstrate TUG score <10 seconds for reduced fall risk.                           STATUS:  MET - PROGRESSED              STG 1a: 6 weeks:  The patient will demonstrate TUG score <22 seconds for reduced fall risk.                           STATUS:  MET        2. The patient has limited strength of the B shoulders and B hips.              LTG 2: 12 weeks:  The patient will demonstrate 5 /5 strength for B hip flexion, abduction, and extension in order to improve hip stability.                          STATUS:  IN PROGRESS              LTG 2a: 6 weeks:  The patient will  demonstrate 4+ /5 strength for B hip flexion, abduction, and extension in order to improve hip stability.                          STATUS:  MET                    3. The patient has gait dysfunction.              LTG 3: 12 weeks:  The patient will demonstrate > 24 / 30 on the Functional Gait Assessment for improved functional mobility.                           STATUS:  IN PROGRESS              STG 3a: The patient will demonstrate > 18 / 30 on the Functional Gait Assessment for improved functional mobility.                           STATUS: IN PROGRESS                       4. The patient has difficulty with balance.               LTG 4: 12 weeks: The patient will hold the tandem position on stable surface with eyes closed for 30 seconds in order to improve balance and decrease risk of falling.                           STATUS: IN PROGRESS              STG 4: 6 weeks: The patient will hold the feet together position on unstable surface with eyes open for 30 seconds in order to improve balance and decrease risk of falling.                           STATUS: IN PROGRESS        5. The patient has gait dysfunction.                 LTG 5: 12 weeks: The patient will ambulate without assistive device, independently, for community distances in order to improve mobility and allow patient to perform activities such as grocery shopping with greater ease.              STATUS: IN PROGRESS              LTG 5: 12 weeks: The patient will ambulate without assistive device, independently, for household distances in order to improve mobility and allow patient to perform activities such as ADL's with greater ease.              STATUS: MET    6. The patient has limited strength of the bilateral shoulder.    LTG 6: 12 weeks: The patient will demonstrate 5/5 strength for bilateral shoulder flexion, abduction, external rotation, and internal rotation in order to demonstrate improved shoulder stability.    STATUS: New      STG 6a: 6 weeks:  The patient will demonstrate 4+/5 strength for bilateral shoulder flexion, abduction, external rotation, and internal rotation.    STATUS: New    Progress toward previous goals: Partially Met    See Exercise, Manual, and Modality Logs for complete treatment.         Recommendations: Continue as planned  Timeframe: 2x a week for 3 months  Prognosis to achieve goals: good    PT Signature: Electronically signed [facility] [place] [facility] [place] [id number]      Based upon review of the patient's progress and continued therapy plan, it is my medical opinion that Monika Gu should continue physical therapy treatment at [facility]  [address] [place] [id number] [phone].    Signature: __________________________________  Dillon Osorio MD    Timed:           Timed:         Manual Therapy:    8     mins  85796;     Therapeutic Exercise:    10     mins  23760;     Neuromuscular Go:    10    mins  42904;    Therapeutic Activity:     23     mins  24286;     Gait Trainin     mins  17493;     Ultrasound:     0     mins  42725;    Ionto                               0    mins   69809  Self pay                         0     mins [email or web address]    Un-Timed:  Electrical Stimulation:    0     mins  86602 (MC )  Canalith Repos    0     mins 21373  Dry Needling     0     mins self-pay  Traction     0     mins 13856  Re-Eval                           10    mins  67440    Timed Treatment:   51   mins   Total Treatment:     61   mins    PT SIGNATURE: Electronically signed by Adan Portillo PT  [place] LICENSE: [id number]     DATE TREATMENT INITIATED: 2025    90 Day Recertification  Certification Period: 2025 thru 2025  I certify that the therapy services are furnished while this patient is under my care.  The services outlined above are required by this patient, and will be reviewed every 90 days.     PHYSICIAN: Dillon Osorio MD   NPI:  [id number]      DATE:     Please sign and return via fax to [phone] Thank you, [facility].

## 2025-06-17 NOTE — TELEPHONE ENCOUNTER
Pt had called to discuss referral reports that Dr. Hammond is not taking new pts at this time. Will send provider a message as Dr. Hammond office suggested Dalton daniels with [facility].     Ok to place new referral?

## 2025-06-20 ENCOUNTER — TREATMENT (OUTPATIENT)
Dept: PHYSICAL THERAPY | Facility: CLINIC | Age: 45
End: 2025-06-20
Payer: OTHER MISCELLANEOUS

## 2025-06-20 DIAGNOSIS — R27.8 DECREASED COORDINATION: ICD-10-CM

## 2025-06-20 DIAGNOSIS — M62.81 MUSCLE WEAKNESS OF LEFT UPPER EXTREMITY: ICD-10-CM

## 2025-06-20 DIAGNOSIS — M25.512 ACUTE PAIN OF LEFT SHOULDER: ICD-10-CM

## 2025-06-20 DIAGNOSIS — Z74.09 IMPAIRED FUNCTIONAL MOBILITY, BALANCE, AND ENDURANCE: Primary | ICD-10-CM

## 2025-06-20 DIAGNOSIS — Z98.890 S/P CRANIOTOMY: ICD-10-CM

## 2025-06-20 DIAGNOSIS — R29.898 WEAKNESS OF BOTH LOWER EXTREMITIES: ICD-10-CM

## 2025-06-22 NOTE — PROGRESS NOTES
Physical Therapy Daily Note  [address] [facility] [address] [place] [address]    VISIT#: 19    Subjective   Monika Gu reports she had a really rough day after last therapy session. Pt reports it took her a few days to recover. Pt became very dizzy last therapy session.       Objective     See Exercise, Manual, and Modality Logs for complete treatment.     Assessment/Plan    Continued to progress patient's strengthening exercises and patient tolerated well. Added more UE strengthening today and patient able to complete with reports of slight increase in pressure in head, but overall could tolerate exercise. Pt did not become dizzy today and did not require as frequent rest breaks. Will continue to progress patient as able.       Progress per Plan of Care and Progress strengthening /stabilization /functional activity            Timed:                 Manual Therapy:    0     mins  83675;     Therapeutic Exercise:    23     mins  43261;     Neuromuscular Go:    12    mins  91880;    Therapeutic Activity:     10     mins  85630;     Gait Trainin     mins  74000;     Ultrasound:     0     mins  44914;    Ionto                               0    mins   11315  Self pay                         0     mins [email or web address]    Un-Timed:  Electrical Stimulation:    0     mins  98070 ( )  Canalith Repos    0     mins 49853  Dry Needling     0     mins self-pay  Traction     0     mins 20594    Timed Treatment:   45   mins   Total Treatment:     65   mins    Adan Portillo PT  Physical Therapist    PT SIGNATURE: Electronically signed by Adan Portillo PT  [place] LICENSE: [id number]

## 2025-06-24 ENCOUNTER — TREATMENT (OUTPATIENT)
Dept: PHYSICAL THERAPY | Facility: CLINIC | Age: 45
End: 2025-06-24
Payer: OTHER MISCELLANEOUS

## 2025-06-24 ENCOUNTER — OFFICE VISIT (OUTPATIENT)
Age: 45
End: 2025-06-24
Payer: COMMERCIAL

## 2025-06-24 VITALS
HEIGHT: 62 IN | WEIGHT: 136.5 LBS | BODY MASS INDEX: 25.12 KG/M2 | DIASTOLIC BLOOD PRESSURE: 62 MMHG | SYSTOLIC BLOOD PRESSURE: 110 MMHG | HEART RATE: 72 BPM | OXYGEN SATURATION: 98 %

## 2025-06-24 DIAGNOSIS — L25.9 CONTACT DERMATITIS AND ECZEMA: Primary | ICD-10-CM

## 2025-06-24 DIAGNOSIS — R29.898 WEAKNESS OF BOTH LOWER EXTREMITIES: ICD-10-CM

## 2025-06-24 DIAGNOSIS — M62.81 MUSCLE WEAKNESS OF LEFT UPPER EXTREMITY: ICD-10-CM

## 2025-06-24 DIAGNOSIS — R27.8 DECREASED COORDINATION: ICD-10-CM

## 2025-06-24 DIAGNOSIS — Z98.890 S/P CRANIOTOMY: ICD-10-CM

## 2025-06-24 DIAGNOSIS — M25.512 ACUTE PAIN OF LEFT SHOULDER: ICD-10-CM

## 2025-06-24 DIAGNOSIS — Z74.09 IMPAIRED FUNCTIONAL MOBILITY, BALANCE, AND ENDURANCE: Primary | ICD-10-CM

## 2025-06-24 PROCEDURE — 99213 OFFICE O/P EST LOW 20 MIN: CPT | Performed by: NURSE PRACTITIONER

## 2025-06-24 PROCEDURE — 97530 THERAPEUTIC ACTIVITIES: CPT | Performed by: PHYSICAL THERAPIST

## 2025-06-24 PROCEDURE — 97110 THERAPEUTIC EXERCISES: CPT | Performed by: PHYSICAL THERAPIST

## 2025-06-24 PROCEDURE — 97112 NEUROMUSCULAR REEDUCATION: CPT | Performed by: PHYSICAL THERAPIST

## 2025-06-24 RX ORDER — METHYLPREDNISOLONE 4 MG/1
TABLET ORAL
Qty: 21 TABLET | Refills: 0 | Status: SHIPPED | OUTPATIENT
Start: 2025-06-24

## 2025-06-24 NOTE — PATIENT INSTRUCTIONS
Take medrol dose sydni take as  instructed   Try to journal to find causative agents   Follow-up with dermatology  Try to make sure all products are for sensitive skin and maintain consistency with products use

## 2025-06-24 NOTE — PROGRESS NOTES
Chief Complaint  Rash (Rash on legs, has seen derm. She is on a beta blocker for heart arash started at this time.)    Azul Gu is a 44 y.o. female who presents to [facility] comes in today for an acute visit.  She has a reoccurring rash to her bilateral lower extremities it comes and goes initially it started out as a fine red patchy rash it does not itch is not painful slightly raised but not significantly.  It then turns dark brown in color and she feels like when it leaves it leaves a yellowish discoloration.  It comes and goes.  She has not been able to identify any causative agents.  It has been going on for over a year.  She has seen dermatology who told her the rash was most likely benign and offered no other treatment.  However since seeing Derm she feels like it is more pronounced and comes back more often.  She reports having typically sensitive skin and keeps track of all the products she uses and is very consistent.  She has had no recent changes.  Today she feels like the rash is not as pronounced as it typically is but 2 days ago it was significant.  She does think the increase activity such as walking makes it much more pronounced.    She mentions she first noticed the rash approximately 1 year ago when she was started on a low-dose beta-blocker.  She is concerned that it may be the cause of the rash.  She is consistently taking the beta-blocker daily and the rash has not spread any further than her lower extremities.  It is not typical of your drug rash no hives not itching does not spread I have explained to her I feel like this is not likely the cause I would like for her to go back and see Derm as she has established a relationship with them and perhaps they could shed further light on what is causing the rash.  I have also instructed her that the Medrol Dosepak is like a blanket covering to make the rash calm down but may not treat the  "cause.  I would like for her to keep a journal of skin products clothing environmental factors to see if we can identify any other causative agents.    History of Present Illness     Review of Systems, see HPI      Objective   Vital Signs:   Vitals:    06/24/25 1307   BP: 110/62   Pulse: 72   SpO2: 98%   Weight: 61.9 kg (136 lb 8 oz)   Height: 157.5 cm (62.01\")     Body mass index is 24.96 kg/m².    Wt Readings from Last 3 Encounters:   06/24/25 61.9 kg (136 lb 8 oz)   06/06/25 60.8 kg (134 lb)   04/23/25 61 kg (134 lb 6.4 oz)     BP Readings from Last 3 Encounters:   06/24/25 110/62   06/06/25 118/64   04/23/25 122/72       Health Maintenance   Topic Date Due    COLORECTAL CANCER SCREENING  10/13/2024    ANNUAL PHYSICAL  09/24/2025    LIPID PANEL  03/19/2026    MAMMOGRAM  09/03/2026    TDAP/TD VACCINES (3 - Tdap) 09/29/2033    HEPATITIS C SCREENING  Completed    Pneumococcal Vaccine 0-49  Aged Out    COVID-19 Vaccine  Discontinued    INFLUENZA VACCINE  Discontinued       Physical Exam  Constitutional:       Appearance: Normal appearance.   HENT:      Head: Normocephalic.      Nose: Nose normal.      Mouth/Throat:      Mouth: Mucous membranes are moist.   Eyes:      Conjunctiva/sclera: Conjunctivae normal.      Pupils: Pupils are equal, round, and reactive to light.   Cardiovascular:      Rate and Rhythm: Normal rate and regular rhythm.   Pulmonary:      Effort: Pulmonary effort is normal.      Breath sounds: Normal breath sounds.   Abdominal:      General: Bowel sounds are normal.      Palpations: Abdomen is soft.   Musculoskeletal:         General: Normal range of motion.      Cervical back: Normal range of motion.   Skin:     General: Skin is warm and dry.      Comments: Scattered patchy rash to bilateral lower extremities with fine pinpoint erythematous spots slightly raised slightly rough but no vesicles tiny not itchy it does leola when palpated   Neurological:      General: No focal deficit present.      " Mental Status: She is alert and oriented to person, place, and time.   Psychiatric:         Mood and Affect: Mood normal.         Behavior: Behavior normal.         Thought Content: Thought content normal.          Physical Exam         Result Review :  The following data was reviewed by: ALYSSA Ford on 06/24/2025:    Labs  No visits with results within 2 Month(s) from this visit.   Latest known visit with results is:   Lab on 03/19/2025   Component Date Value Ref Range Status    Total Cholesterol 03/19/2025 256 (H)  0 - 200 mg/dL Final    Triglycerides 03/19/2025 100  0 - 150 mg/dL Final    HDL Cholesterol 03/19/2025 51  40 - 60 mg/dL Final    LDL Cholesterol  03/19/2025 187 (H)  0 - 100 mg/dL Final    VLDL Cholesterol 03/19/2025 18  5 - 40 mg/dL Final    LDL/HDL Ratio 03/19/2025 3.63   Final    Glucose 03/19/2025 108 (H)  65 - 99 mg/dL Final    BUN 03/19/2025 12  6 - 20 mg/dL Final    Creatinine 03/19/2025 0.86  0.57 - 1.00 mg/dL Final    Sodium 03/19/2025 141  136 - 145 mmol/L Final    Potassium 03/19/2025 3.7  3.5 - 5.2 mmol/L Final    Chloride 03/19/2025 104  98 - 107 mmol/L Final    CO2 03/19/2025 25.9  22.0 - 29.0 mmol/L Final    Calcium 03/19/2025 9.7  8.6 - 10.5 mg/dL Final    Total Protein 03/19/2025 7.8  6.0 - 8.5 g/dL Final    Albumin 03/19/2025 4.5  3.5 - 5.2 g/dL Final    ALT (SGPT) 03/19/2025 19  1 - 33 U/L Final    AST (SGOT) 03/19/2025 24  1 - 32 U/L Final    Alkaline Phosphatase 03/19/2025 90  39 - 117 U/L Final    Total Bilirubin 03/19/2025 0.3  0.0 - 1.2 mg/dL Final    Globulin 03/19/2025 3.3  gm/dL Final    A/G Ratio 03/19/2025 1.4  g/dL Final    BUN/Creatinine Ratio 03/19/2025 14.0  7.0 - 25.0 Final    Anion Gap 03/19/2025 11.1  5.0 - 15.0 mmol/L Final    eGFR 03/19/2025 85.0  >60.0 mL/min/1.73 Final    TSH 03/19/2025 0.897  0.270 - 4.200 uIU/mL Final    Hepatitis C Ab 03/19/2025 Non-Reactive  Non-Reactive Final    Hemoglobin A1C 03/19/2025 5.70 (H)  4.80 - 5.60 % Final     Magnesium 03/19/2025 2.3  1.6 - 2.6 mg/dL Final        Imaging  No Images in the past 120 days found..    Results         Procedures         ASSESSMENT & PLAN  Diagnoses and all orders for this visit:    1. Contact dermatitis and eczema (Primary)  -     methylPREDNISolone (MEDROL) 4 MG dose pack; Take as directed on package instructions.  Dispense: 21 tablet; Refill: 0         Assessment & Plan         BMI is within normal parameters. No other follow-up for BMI required.           FOLLOW UP  Return if symptoms worsen or fail to improve.  Patient was given instructions and counseling regarding her condition or for health maintenance advice. Please see specific information pulled into the AVS if appropriate.     Patient or patient representative verbalized consent for the use of Ambient Listening during the visit with  ALYSSA Ford for chart documentation. 6/24/2025  13:44 EDT    ALYSSA Ford  06/24/25  13:44 EDT

## 2025-06-24 NOTE — PROGRESS NOTES
Physical Therapy Daily Note  [address] [facility] [address] [place] [address]    VISIT#: 20    Subjective   Monika Gu reports 0/10 today. Pt reports she went to surgeon yesterday and reports surgeon is pleased with her progress. Pt states the surgeon is going to let PT determine when she is safe to drive.       Objective     See Exercise, Manual, and Modality Logs for complete treatment.     Assessment/Plan    Continued to progress patient's strengthening exercises and patient tolerated well, but did report increase in neck pain with higher level balance exercises. Due to increase in neck pain, performed neck strengthening exercise and patient was challenged due to weakness in cervical stabilizers. Pt required cues to decrease leaning of body into door and to active cervical stabilizers. Pt required less breaks today with exercises demonstrating improvement to tolerance of exercises. Pt did require CGA/min assist with some higher level balance exercises. Will continue to progress patient as able.     Progress per Plan of Care and Progress strengthening /stabilization /functional activity            Timed:                 Manual Therapy:    0     mins  75507;     Therapeutic Exercise:    23     mins  50532;     Neuromuscular Go:    12    mins  78523;    Therapeutic Activity:     23     mins  32909;     Gait Trainin     mins  06231;     Ultrasound:     0     mins  71958;    Ionto                               0    mins   18030  Self pay                         0     mins [email or web address]    Un-Timed:  Electrical Stimulation:    0     mins  07296 ( )  Canalith Repos    0     mins 84594  Dry Needling     0     mins self-pay  Traction     0     mins 73102    Timed Treatment:   58   mins   Total Treatment:     58   mins    Adan Portillo PT  Physical Therapist    PT SIGNATURE: Electronically signed by Adan Portillo PT  [place] LICENSE: [id number]

## 2025-07-01 ENCOUNTER — TREATMENT (OUTPATIENT)
Dept: PHYSICAL THERAPY | Facility: CLINIC | Age: 45
End: 2025-07-01
Payer: OTHER MISCELLANEOUS

## 2025-07-01 DIAGNOSIS — R27.8 DECREASED COORDINATION: ICD-10-CM

## 2025-07-01 DIAGNOSIS — M25.512 ACUTE PAIN OF LEFT SHOULDER: ICD-10-CM

## 2025-07-01 DIAGNOSIS — Z74.09 IMPAIRED FUNCTIONAL MOBILITY, BALANCE, AND ENDURANCE: Primary | ICD-10-CM

## 2025-07-01 DIAGNOSIS — M62.81 MUSCLE WEAKNESS OF LEFT UPPER EXTREMITY: ICD-10-CM

## 2025-07-01 DIAGNOSIS — R29.898 WEAKNESS OF BOTH LOWER EXTREMITIES: ICD-10-CM

## 2025-07-01 DIAGNOSIS — Z98.890 S/P CRANIOTOMY: ICD-10-CM

## 2025-07-01 NOTE — PROGRESS NOTES
Physical Therapy Daily Note  [address] [facility] [address] [place] [address]    VISIT#: 21    Subjective   Monika Gu reports she is a little fatigued today, but otherwise no new complaints.       Objective     See Exercise, Manual, and Modality Logs for complete treatment.     Assessment/Plan    Continued to progress patient's strengthening and balance exercises today and patient tolerated well with less breaks in between exercises. Performed some UE strengthening exercises today, but patient had increase in symptoms after last session that several UE exercises were performed, therefore will only do a few at a time in each session. Will continue to progress patient as able.       Progress per Plan of Care and Progress strengthening /stabilization /functional activity            Timed:                 Manual Therapy:    0     mins  04307;     Therapeutic Exercise:    16     mins  13187;     Neuromuscular Go:    10    mins  80276;    Therapeutic Activity:     24     mins  71327;     Gait Trainin     mins  84833;     Ultrasound:     0     mins  43097;    Ionto                               0    mins   85875  Self pay                         0     mins [email or web address]    Un-Timed:  Electrical Stimulation:    0     mins  88175 ( )  Canalith Repos    0     mins 82492  Dry Needling     0     mins self-pay  Traction     0     mins 14365    Timed Treatment:   50   mins   Total Treatment:     50   mins    Adan Portillo PT  Physical Therapist    PT SIGNATURE: Electronically signed by Adan Portillo PT  [place] LICENSE: [id number]

## 2025-07-03 ENCOUNTER — TREATMENT (OUTPATIENT)
Dept: PHYSICAL THERAPY | Facility: CLINIC | Age: 45
End: 2025-07-03
Payer: OTHER MISCELLANEOUS

## 2025-07-03 DIAGNOSIS — M25.512 ACUTE PAIN OF LEFT SHOULDER: ICD-10-CM

## 2025-07-03 DIAGNOSIS — Z98.890 S/P CRANIOTOMY: ICD-10-CM

## 2025-07-03 DIAGNOSIS — R27.8 DECREASED COORDINATION: ICD-10-CM

## 2025-07-03 DIAGNOSIS — Z74.09 IMPAIRED FUNCTIONAL MOBILITY, BALANCE, AND ENDURANCE: Primary | ICD-10-CM

## 2025-07-03 DIAGNOSIS — R29.898 WEAKNESS OF BOTH LOWER EXTREMITIES: ICD-10-CM

## 2025-07-03 DIAGNOSIS — M62.81 MUSCLE WEAKNESS OF LEFT UPPER EXTREMITY: ICD-10-CM

## 2025-07-03 NOTE — PROGRESS NOTES
Physical Therapy Daily Note  [address] [facility] [address] [place] [address]    VISIT#: 22    Subjective   Monika Gu reports no new complaints today and reports no pain today.       Objective     See Exercise, Manual, and Modality Logs for complete treatment.     Assessment/Plan    Continued to progress patient exercises and patient tolerated. Performed walking with tracking a ball on a diagonal today and patient became symptomatic with exercise and required rest break after each rep to recover. Pt also required CGA after rep due to becoming unsteady. Pt was able to ambulate on treadmill today for the first time, patient was challenged with exercise due to decrease in balance, but patient able to perform without becoming symptomatic. Will continue to progress patient as able.     Progress per Plan of Care and Progress strengthening /stabilization /functional activity            Timed:                 Manual Therapy:    0     mins  01774;     Therapeutic Exercise:    23     mins  57439;     Neuromuscular Go:    12    mins  51746;    Therapeutic Activity:     25     mins  38691;     Gait Trainin     mins  42392;     Ultrasound:     0     mins  65098;    Ionto                               0    mins   97764  Self pay                         0     mins [email or web address]    Un-Timed:  Electrical Stimulation:    0     mins  03199 ( )  Canalith Repos    0     mins 84386  Dry Needling     0     mins self-pay  Traction     0     mins 81969    Timed Treatment:   60   mins   Total Treatment:     60   mins    Adan Portillo PT  Physical Therapist    PT SIGNATURE: Electronically signed by Adan Portillo PT  [place] LICENSE: [id number]

## 2025-07-09 ENCOUNTER — TREATMENT (OUTPATIENT)
Dept: PHYSICAL THERAPY | Facility: CLINIC | Age: 45
End: 2025-07-09
Payer: OTHER MISCELLANEOUS

## 2025-07-09 DIAGNOSIS — M25.512 ACUTE PAIN OF LEFT SHOULDER: ICD-10-CM

## 2025-07-09 DIAGNOSIS — R29.898 WEAKNESS OF BOTH LOWER EXTREMITIES: ICD-10-CM

## 2025-07-09 DIAGNOSIS — R27.8 DECREASED COORDINATION: ICD-10-CM

## 2025-07-09 DIAGNOSIS — Z74.09 IMPAIRED FUNCTIONAL MOBILITY, BALANCE, AND ENDURANCE: Primary | ICD-10-CM

## 2025-07-09 DIAGNOSIS — Z98.890 S/P CRANIOTOMY: ICD-10-CM

## 2025-07-09 DIAGNOSIS — M62.81 MUSCLE WEAKNESS OF LEFT UPPER EXTREMITY: ICD-10-CM

## 2025-07-09 NOTE — PROGRESS NOTES
"Physical Therapy Daily Treatment Note  [address] [place] [address]        Patient: Monika Gu   : 1980  Diagnosis/ICD-10 Code:  Impaired functional mobility, balance, and endurance [Z74.09]  Referring practitioner: Dillon Osorio MD  Date of Initial Visit: Type: THERAPY  Noted: 3/3/2025  Today's Date: 2025  Patient seen for 23 sessions             Subjective   Monika Gu reports having mild pain at left side of neck upon arrival today.  She reported  having overall, feeling \"Drained\" and reports \"Mental fatigue\" after undergoing 3 hour cognitive test yesterday.    Objective       See Exercise  Logs for complete treatment.       Assessment/Plan    Pt tolerated therapy session  moderately well today  with progression of therapeutic exercises and  Functional activities. She has improved, but continues to have  deficits in her Trunk and Bilateral Upper and Bilateral Lower extremity  Strength,Stability, Balance, and Functional tolerance; limiting functional mobility and ability to perform all ADLs and Work activities without increased pain or difficulty  at this time.  Overall, Improved tolerance to increased exercise and functional activity performance; however, Slight increase in reports of \"Dizziness\" and \"Mental fatigue\" reported today after cognitive test yesterday.    Pt did well with dual task activities today - Performing walking with tracking a ball on a diagonal, as well as, bouncing a ball while adding cognitive component.  Patient did however, become symptomatic with bouncing of ball and tossing beach ball activity and required rest breaks intermittently to recover.      Progress per Plan of Care- as tolerated.               Timed:  Manual Therapy:    0     mins  25340;  Therapeutic Exercise:    23     mins  12796;     Neuromuscular Go:    12    mins  88196;    Therapeutic Activity:     25     mins  80635;     Gait Trainin     mins  56420;     Ultrasound:     " 0     mins  18994;    Electrical Stimulation:    00     mins  08621;  Iontophoresis     0     mins  70548    Untimed:  Electrical Stimulation:    0     mins  09715 ( );  Mechanical Traction:    0     mins  22593;   Fluidotherapy     0     mins  97326  Hot pack     0     mins  93928  Cold pack     0     mins  46492    Timed Treatment:   60   mins   Total Treatment:     60   mins        Marisa Galaviz PTA  Physical Therapist Assistant

## 2025-07-15 ENCOUNTER — TREATMENT (OUTPATIENT)
Dept: PHYSICAL THERAPY | Facility: CLINIC | Age: 45
End: 2025-07-15
Payer: OTHER MISCELLANEOUS

## 2025-07-15 DIAGNOSIS — R29.898 WEAKNESS OF BOTH LOWER EXTREMITIES: ICD-10-CM

## 2025-07-15 DIAGNOSIS — M62.81 MUSCLE WEAKNESS OF LEFT UPPER EXTREMITY: ICD-10-CM

## 2025-07-15 DIAGNOSIS — Z74.09 IMPAIRED FUNCTIONAL MOBILITY, BALANCE, AND ENDURANCE: Primary | ICD-10-CM

## 2025-07-15 DIAGNOSIS — R27.8 DECREASED COORDINATION: ICD-10-CM

## 2025-07-15 DIAGNOSIS — Z98.890 S/P CRANIOTOMY: ICD-10-CM

## 2025-07-15 PROCEDURE — 97112 NEUROMUSCULAR REEDUCATION: CPT | Performed by: PHYSICAL THERAPIST

## 2025-07-15 PROCEDURE — 97530 THERAPEUTIC ACTIVITIES: CPT | Performed by: PHYSICAL THERAPIST

## 2025-07-15 NOTE — PROGRESS NOTES
Physical Therapy Daily Note  [address] [facility] [address] [place] [address]    VISIT#: 24    Subjective   Monika Gu reports no new complaints today. Pt reports she had a migraine yesterday and reports she is still a little tired from migraine.       Objective     See Exercise, Manual, and Modality Logs for complete treatment.     Assessment/Plan    Continued to progress patient's strengthening and vestibular exercises and patient able to complete, but became more symptomatic as patient fatigued. Pt was slightly more off balance with ambulation today as she fatigued and had decrease in gait speed and increase in base of support. Pt was challenged with cognitive tasks that required her to find words. Will continue to progress patient as able.     Progress per Plan of Care and Progress strengthening /stabilization /functional activity            Timed:                 Manual Therapy:   0      mins  64473;     Therapeutic Exercise:    16     mins  50171;     Neuromuscular Go:   24     mins  32607;    Therapeutic Activity:    24      mins  53682;     Gait Trainin     mins  28031;     Ultrasound:     0     mins  12273;    Ionto                               0    mins   95661  Self pay                         0     mins [email or web address]    Un-Timed:  Electrical Stimulation:    0     mins  00470 ( )  Canalith Repos    0     mins 29040  Dry Needling     0     mins self-pay  Traction     0     mins 31353    Timed Treatment:  64    mins   Total Treatment:     64   mins    Adan Portillo PT  Physical Therapist    PT SIGNATURE: Electronically signed by Adan Portillo PT  [place] LICENSE: [id number]

## 2025-07-18 ENCOUNTER — TREATMENT (OUTPATIENT)
Dept: PHYSICAL THERAPY | Facility: CLINIC | Age: 45
End: 2025-07-18
Payer: OTHER MISCELLANEOUS

## 2025-07-18 DIAGNOSIS — Z98.890 S/P CRANIOTOMY: ICD-10-CM

## 2025-07-18 DIAGNOSIS — R27.8 DECREASED COORDINATION: ICD-10-CM

## 2025-07-18 DIAGNOSIS — Z74.09 IMPAIRED FUNCTIONAL MOBILITY, BALANCE, AND ENDURANCE: Primary | ICD-10-CM

## 2025-07-18 DIAGNOSIS — M62.81 MUSCLE WEAKNESS OF LEFT UPPER EXTREMITY: ICD-10-CM

## 2025-07-18 DIAGNOSIS — R29.898 WEAKNESS OF BOTH LOWER EXTREMITIES: ICD-10-CM

## 2025-07-18 DIAGNOSIS — M25.512 ACUTE PAIN OF LEFT SHOULDER: ICD-10-CM

## 2025-07-18 NOTE — PROGRESS NOTES
Progress Assessment  [address] [facility] [address] [place] [address]        Patient: Monika Gu   : 1980  Diagnosis/ICD-10 Code:  Impaired functional mobility, balance, and endurance [Z74.09]  Referring practitioner: Dillon Osorio MD  Date of Initial Visit: Type: THERAPY  Noted: 3/3/2025  Today's Date: 2025  Patient seen for 25 sessions      Subjective:   Monika Gu reports: 4/10  Subjective Questionnaire: LEFS: 35/  Clinical Progress: improved  Home Program Compliance: Yes  Treatment has included: therapeutic exercise, neuromuscular re-education, manual therapy, therapeutic activity, and gait training    Subjective     Pt reports she is having a really hard day. Pt states about 30 minutes ago she started having a really bad migraine and is not feeling well.     Objective       Strength/Myotome Testing      Left Hip   Planes of Motion   Flexion: 5  Abduction: 5  Adduction: 5     Right Hip   Planes of Motion   Flexion: 5  Abduction: 5  Adduction: 5     Left Knee   Flexion: 5  Extension: 5     Right Knee   Flexion: 5  Extension: 5     Left Ankle/Foot   Dorsiflexion: 5     Right Ankle/Foot   Dorsiflexion: 5     Right Shoulder     Flexion: 5  Abduction: 5  ER: 5  IR: 5  Elbow flexion: 5  Elbow extension: 5     Left Shoulder     Flexion: 5  Abduction: 5  ER: 5  IR: 5  Elbow flexion: 5  Elbow extension: 5     Ambulation      Observational Gait      Additional Observational Gait Details    Pt ambulates with no AD with decrease in gait speed but does have improvement with base of support.      Functional Assessment      Comments  5 time STS: 14.2 seconds     Feet together EO: 30 seconds  Feet together EC: 30 seconds   Tandem R foot in front: 30 seconds   Tandem L foot in front: 30 seconds  SL: 30 seconds each side     FGA: Did not perform today due to patient being symptomatic with turning head.     TU.33 seconds with no AD       Assessment/Plan    Pt has made excellent progress with improving both  UE and LE strength since last progress note. Pt has also made great progress with improving balance and functional testing. Pt does still become symptomatic with prolonged activity or activities that require dual tasking or diagonal head turns. By end of therapy session patient is significantly fatigued and requires the rest of the day to recover. Due to patient's pain today, performed increase in time on manual therapy which did improve patient's pain. Pt requires continued skilled PT to address deficits listed to return patient back to maximum function such as driving and being able to grocery shop. Discussed with patient that if patient continues to make great progress, may consider decreasing frequency to 1x a week.     Goals  Plan Goals: 1. Mobility: Walking/Moving Around Functional Limitation                               LTG 1: 12 weeks:  The patient will demonstrate TUG score <10 seconds for reduced fall risk.                           STATUS:  MET              STG 1a: 6 weeks:  The patient will demonstrate TUG score <22 seconds for reduced fall risk.                           STATUS:  MET        2. The patient has limited strength of the B shoulders and B hips.              LTG 2: 12 weeks:  The patient will demonstrate 5 /5 strength for B hip flexion, abduction, and extension in order to improve hip stability.                          STATUS:  MET              LTG 2a: 6 weeks:  The patient will demonstrate 4+ /5 strength for B hip flexion, abduction, and extension in order to improve hip stability.                          STATUS:  MET                    3. The patient has gait dysfunction.              LTG 3: 12 weeks:  The patient will demonstrate > 24 / 30 on the Functional Gait Assessment for improved functional mobility.                           STATUS:  IN PROGRESS              STG 3a: The patient will demonstrate > 18 / 30 on the Functional Gait Assessment for improved functional mobility.                            STATUS: IN PROGRESS                       4. The patient has difficulty with balance.               LTG 4: 12 weeks: The patient will hold the tandem position on stable surface with eyes closed for 30 seconds in order to improve balance and decrease risk of falling.                           STATUS: IN PROGRESS              STG 4: 6 weeks: The patient will hold the feet together position on unstable surface with eyes open for 30 seconds in order to improve balance and decrease risk of falling.                           STATUS: IN PROGRESS        5. The patient has gait dysfunction.                 LTG 5: 12 weeks: The patient will ambulate without assistive device, independently, for community distances in order to improve mobility and allow patient to perform activities such as grocery shopping with greater ease.              STATUS: MET              LTG 5: 12 weeks: The patient will ambulate without assistive device, independently, for household distances in order to improve mobility and allow patient to perform activities such as ADL's with greater ease.              STATUS: MET     6. The patient has limited strength of the bilateral shoulder.     LTG 6: 12 weeks: The patient will demonstrate 5/5 strength for bilateral shoulder flexion, abduction, external rotation, and internal rotation in order to demonstrate improved shoulder stability.     STATUS: MET        STG 6a: 6 weeks: The patient will demonstrate 4+/5 strength for bilateral shoulder flexion, abduction, external rotation, and internal rotation.     STATUS: MET    Progress toward previous goals: Partially Met    See Exercise, Manual, and Modality Logs for complete treatment.         Recommendations: Continue as planned  Timeframe:2x a week for 2 months  Prognosis to achieve goals: good    PT SIGNATURE: Electronically signed by Adan Portillo PT  [place] LICENSE: [id number]    Based upon review of the patient's progress and continued  therapy plan, it is my medical opinion that Monika Gu should continue physical therapy treatment at [facility]  [address] [address] [phone].      Timed:                 Manual Therapy:    25     mins  66195;     Therapeutic Exercise:    10     mins  56474;     Neuromuscular Go:    8    mins  00598;    Therapeutic Activity:     10     mins  34059;     Gait Trainin     mins  52077;     Ultrasound:     0     mins  16447;    Ionto                               0    mins   29196  Self pay                         0     mins [email or web address]    Un-Timed:  Electrical Stimulation:    0     mins  69774 ( )  Canalith Repos    0     mins 06618  Dry Needling     0     mins self-pay  Traction     0     mins 80378  Re-evaluation     8     mins 10238    Timed Treatment:   61   mins   Total Treatment:     61   mins      I certify that the therapy services are furnished while this patient is under my care.  The services outlined above are required by this patient, and will be reviewed every 90 days.

## 2025-07-22 ENCOUNTER — TREATMENT (OUTPATIENT)
Dept: PHYSICAL THERAPY | Facility: CLINIC | Age: 45
End: 2025-07-22
Payer: OTHER MISCELLANEOUS

## 2025-07-22 ENCOUNTER — TELEMEDICINE (OUTPATIENT)
Dept: PSYCHIATRY | Facility: CLINIC | Age: 45
End: 2025-07-22
Payer: COMMERCIAL

## 2025-07-22 DIAGNOSIS — F32.9 REACTIVE DEPRESSION: Primary | ICD-10-CM

## 2025-07-22 DIAGNOSIS — R29.898 WEAKNESS OF BOTH LOWER EXTREMITIES: ICD-10-CM

## 2025-07-22 DIAGNOSIS — M25.512 ACUTE PAIN OF LEFT SHOULDER: ICD-10-CM

## 2025-07-22 DIAGNOSIS — G47.9 SLEEP DISTURBANCES: ICD-10-CM

## 2025-07-22 DIAGNOSIS — M62.81 MUSCLE WEAKNESS OF LEFT UPPER EXTREMITY: ICD-10-CM

## 2025-07-22 DIAGNOSIS — Z98.890 S/P CRANIOTOMY: ICD-10-CM

## 2025-07-22 DIAGNOSIS — Z74.09 IMPAIRED FUNCTIONAL MOBILITY, BALANCE, AND ENDURANCE: Primary | ICD-10-CM

## 2025-07-22 DIAGNOSIS — Z79.899 MEDICATION MANAGEMENT: ICD-10-CM

## 2025-07-22 DIAGNOSIS — F41.0 PANIC ANXIETY SYNDROME: ICD-10-CM

## 2025-07-22 DIAGNOSIS — R27.8 DECREASED COORDINATION: ICD-10-CM

## 2025-07-22 PROCEDURE — 97112 NEUROMUSCULAR REEDUCATION: CPT | Performed by: PHYSICAL THERAPIST

## 2025-07-22 PROCEDURE — 97530 THERAPEUTIC ACTIVITIES: CPT | Performed by: PHYSICAL THERAPIST
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
## 2025-07-22 NOTE — PROGRESS NOTES
Physical Therapy Daily Treatment Note  [facility] Therapy [address], [address], [place] [address]  P: [phone]   [phone]    Patient: Monika Gu   : 1980  Referring practitioner: Dillon Osorio MD  Date of Initial Visit: Type: THERAPY  Noted: 3/3/2025  Today's Date: 2025  Patient seen for 26 sessions    Subjective   Monika Gu reports 2/10 pain in head today. Pt reports she had a migraine all weekend which greatly limited her mobility this weekend. Pt reports she felt better yesterday and did more around her house and states because of this she is feeling more off balance today.       Objective     See Exercise, Manual, and Modality Logs for complete treatment.     Assessment/Plan    Continued to progress patient's strengthening exercises and patient tolerated well, but continues to have difficulty with looking up and to the left. Pt becomes dizzy and off balance. Overall, patient did well today, but required slightly more CGA/supervision with exercises due to balance deficits. Will continue to progress patient as able.       Progress per Plan of Care and Progress strengthening /stabilization /functional activity            Timed:                 Manual Therapy:    0     mins  52536;     Therapeutic Exercise:    12     mins  49377;     Neuromuscular Go:    25    mins  23232;    Therapeutic Activity:     23     mins  77537;     Gait Trainin     mins  50611;     Ultrasound:     0     mins  26503;    Ionto                               0    mins   04318  Self pay                         0     mins [email or web address]    Un-Timed:  Electrical Stimulation:    0     mins  66449 (MC )  Canalith Repos    0     mins 52056  Dry Needling     0     mins self-pay  Traction     0     mins 39557    Timed Treatment:   60   mins   Total Treatment:     60   mins    Adan Portillo PT  Physical Therapist    PT SIGNATURE: Electronically signed by Adan Portillo  [facility] LICENSE: [id number]

## 2025-07-22 NOTE — LETTER
July 22, 2025    Monika Gu  [address]  [place] [address]      [employer]  [address] [place] [address]  Phone: [phone]  Fax: [phone]      Staff:     1. Enrico Gallo, [facility]       Telehealth Only       [phone]       Enrico services older adolescents to geriatric populations, including marital therapy.          She specializes in anxiety, depression, trauma, family relationships, and Gottman         method.     2. Ebonie Whitlock, [facility]       In-person in [place], [place], and [place]. She also does Telehealth       [phone]       Ebonie services young children (age 5) to geriatric populations, including         veterans. She specializes in neuropsychology related issues, such as ADHD, autism        spectrum disorder, and PTSD.     3. Loree Carvajal, [facility]       In-person in [place] and [place]. She also does Telehealth       [phone]       Loree services young children (age 2) to ages 13, including adult individual therapy. She        specializes in play therapy, CPS mandated therapy, parent-child interaction therapy       (PCIT), and parenting education.     4. Kimmie Mcdonald, [facility]       Telehealth Only       [phone]       Kimmie services adolescents to adults, including marital therapy. She specializes in        anxiety, depression, and domestic violence.     5. Denise Quezada, [facility]       Telehealth Only       [phone]       Denise services adolescents to adults, including  services. She specializes in           substances abuse: SMART Recover, CBT-IAN certified, and domestic violence.     6. Corine Cadet, [facility]       In-person in [place]. She also does Telehealth       [phone]       Corine services adults and veterans. She specializes in anxiety, depression, substance           use, and is CCPT certified (Certified Clinical Trauma Professional).     7. Edith  DREW Deutsch       In-person in [facility]. She also does Telehealth       [phone]       Edith specializes in adults with dual diagnoses. Her experience comes from        residential, [facility], and outpatient substance use clinics.     8. Zoë Martinez LCSW       In-person in [facility]. She also does Telehealth       [phone]       Zoë specializes in children, adolescents, and young adults. She has experience with        neurotypical clients, adolescent females, and foster care children.     9. Corinne Hagan, LCSW       In-person in [place]. She also does Telehealth       [phone]       Corinne specializes in trauma therapy, particularly EMDR and  trauma. Corinne        services veterans, active , and  families.       Insurances Accepted:   [place] BC[place]BS Commercial  [place] BC/BS Medicare  [place] EAP  Aetna  Aetna Better Health  Passport/Fox  Wellcare  VAC[employer]    Humana  [employer]  Humana EAP  Medicaid  Cigna  [facility]  Optum  Various EAP groups    *Always verify and make sure the provider is within your network if you wish to use your insurance*                                         ALYSSA Moeller

## 2025-07-24 ENCOUNTER — TELEPHONE (OUTPATIENT)
Dept: PSYCHIATRY | Facility: CLINIC | Age: 45
End: 2025-07-24
Payer: COMMERCIAL

## 2025-07-24 DIAGNOSIS — F41.0 PANIC ANXIETY SYNDROME: ICD-10-CM

## 2025-07-24 DIAGNOSIS — Z79.899 MEDICATION MANAGEMENT: ICD-10-CM

## 2025-07-24 DIAGNOSIS — F32.9 REACTIVE DEPRESSION: Primary | ICD-10-CM

## 2025-07-24 RX ORDER — DESVENLAFAXINE 25 MG/1
TABLET, EXTENDED RELEASE ORAL
Qty: 30 TABLET | Refills: 0 | Status: SHIPPED | OUTPATIENT
Start: 2025-07-24 | End: 2025-08-30

## 2025-07-24 NOTE — TELEPHONE ENCOUNTER
Patient called stating that she would like to move forward with taking pristiq.  Patient would bunny for provider to send that in. Please advise.

## 2025-07-25 ENCOUNTER — TREATMENT (OUTPATIENT)
Dept: PHYSICAL THERAPY | Facility: CLINIC | Age: 45
End: 2025-07-25
Payer: OTHER MISCELLANEOUS

## 2025-07-25 DIAGNOSIS — M62.81 MUSCLE WEAKNESS OF LEFT UPPER EXTREMITY: ICD-10-CM

## 2025-07-25 DIAGNOSIS — R29.898 WEAKNESS OF BOTH LOWER EXTREMITIES: ICD-10-CM

## 2025-07-25 DIAGNOSIS — Z98.890 S/P CRANIOTOMY: ICD-10-CM

## 2025-07-25 DIAGNOSIS — R27.8 DECREASED COORDINATION: ICD-10-CM

## 2025-07-25 DIAGNOSIS — Z74.09 IMPAIRED FUNCTIONAL MOBILITY, BALANCE, AND ENDURANCE: Primary | ICD-10-CM

## 2025-07-25 NOTE — PROGRESS NOTES
Physical Therapy Daily Treatment Note  [facility] [address], [address], [place] [address]  P: [phone]   [phone]    Patient: Monika Gu   : 1980  Referring practitioner: Dillon Osorio MD  Date of Initial Visit: Type: THERAPY  Noted: 3/3/2025  Today's Date: 2025  Patient seen for 27 sessions      Visit Diagnoses:    ICD-10-CM ICD-9-CM   1. Impaired functional mobility, balance, and endurance  Z74.09 V49.89   2. S/P craniotomy  Z98.890 V45.89   3. Decreased coordination  R27.8 781.3   4. Weakness of both lower extremities  R29.898 729.89   5. Muscle weakness of left upper extremity  M62.81 728.87       Subjective   Monika Gu reports she has been resting a lot this week and reports she is feeling better with rest. Pt states that she is still experiencing dizziness with lying in supine.      Objective     See Exercise, Manual, and Modality Logs for complete treatment.     Assessment/Plan    Continued to progress patient's strengthening and balance exercises and patient tolerated well, but due to higher level of exercises did require use of gait belt and CGA. Pt did have slight loss of balance with exercises as she fatigued. Pt did fatigue at end of session and started to experience sharp pain in L eye. Will continue to progress patient's strengthening and balance exercises.     Progress per Plan of Care and Progress strengthening /stabilization /functional activity            Timed:                 Manual Therapy:    0     mins  75295;     Therapeutic Exercise:    14     mins  45494;     Neuromuscular Go:    23    mins  04868;    Therapeutic Activity:     23     mins  75261;     Gait Trainin     mins  16176;     Ultrasound:     0     mins  41421;    Ionto                               0    mins   23595  Self pay                         0     mins [email or web address]    Un-Timed:  Electrical Stimulation:    0     mins  04174 (  )  Canalith Repos    0     mins 78408  Dry Needling     0     mins self-pay  Traction     0     mins 20910    Timed Treatment:   60   mins   Total Treatment:     60   mins    Adan Portillo PT  Physical Therapist    PT SIGNATURE: Electronically signed by Adan Portillo PT  [place] LICENSE: [id number]

## 2025-07-28 ENCOUNTER — TREATMENT (OUTPATIENT)
Dept: PHYSICAL THERAPY | Facility: CLINIC | Age: 45
End: 2025-07-28
Payer: OTHER MISCELLANEOUS

## 2025-07-28 DIAGNOSIS — R27.8 DECREASED COORDINATION: ICD-10-CM

## 2025-07-28 DIAGNOSIS — Z98.890 S/P CRANIOTOMY: ICD-10-CM

## 2025-07-28 DIAGNOSIS — M25.512 ACUTE PAIN OF LEFT SHOULDER: ICD-10-CM

## 2025-07-28 DIAGNOSIS — R29.898 WEAKNESS OF BOTH LOWER EXTREMITIES: ICD-10-CM

## 2025-07-28 DIAGNOSIS — M62.81 MUSCLE WEAKNESS OF LEFT UPPER EXTREMITY: ICD-10-CM

## 2025-07-28 DIAGNOSIS — Z74.09 IMPAIRED FUNCTIONAL MOBILITY, BALANCE, AND ENDURANCE: Primary | ICD-10-CM

## 2025-07-28 PROCEDURE — 97112 NEUROMUSCULAR REEDUCATION: CPT | Performed by: PHYSICAL THERAPIST

## 2025-07-28 PROCEDURE — 97530 THERAPEUTIC ACTIVITIES: CPT | Performed by: PHYSICAL THERAPIST

## 2025-07-28 PROCEDURE — 97110 THERAPEUTIC EXERCISES: CPT | Performed by: PHYSICAL THERAPIST

## 2025-07-28 NOTE — PROGRESS NOTES
Physical Therapy Daily Treatment Note  [facility] [address], [address], [place] [address]  P: [phone]   [phone]    Patient: Monika Gu   : 1980  Referring practitioner: Dillon Osorio MD  Date of Initial Visit: Type: THERAPY  Noted: 3/3/2025  Today's Date: 2025  Patient seen for 28 sessions      Visit Diagnoses:    ICD-10-CM ICD-9-CM   1. Impaired functional mobility, balance, and endurance  Z74.09 V49.89   2. S/P craniotomy  Z98.890 V45.89   3. Decreased coordination  R27.8 781.3   4. Weakness of both lower extremities  R29.898 729.89   5. Muscle weakness of left upper extremity  M62.81 728.87   6. Acute pain of left shoulder  M25.512 719.41       Subjective   Monika Gu reports 3/10 pain in her head today. Pt reports she has continued to limit activity due to not feeling well with extra activity.       Objective     See Exercise, Manual, and Modality Logs for complete treatment.     Assessment/Plan    Continued to progress patient's strengthening and balance exercises. Pt able to increase reps with STS exercise today and increase external resistance with UE exercises today. Pt does continue to fatigue quickly and requires more rest breaks at end of session. Pt does continue with become off balance and reports dizziness with tracking exercises. Will continue to progress patient as able.       Progress per Plan of Care and Progress strengthening /stabilization /functional activity            Timed:                 Manual Therapy:    0     mins  68315;     Therapeutic Exercise:    23     mins  96163;     Neuromuscular Go:    23    mins  67058;    Therapeutic Activity:     14     mins  25157;     Gait Trainin     mins  77876;     Ultrasound:     0     mins  92736;    Ionto                               0    mins   12655  Self pay                         0     mins [email or web address]    Un-Timed:  Electrical Stimulation:    0     mins   51829 ( )  Canalith Repos    0     mins 64677  Dry Needling     0     mins self-pay  Traction     0     mins 58759    Timed Treatment:   60   mins   Total Treatment:     60   mins    Adan Portillo PT  Physical Therapist    PT SIGNATURE: Electronically signed by Adan Portillo PT  [place] LICENSE: [id number]

## 2025-08-01 ENCOUNTER — TREATMENT (OUTPATIENT)
Dept: PHYSICAL THERAPY | Facility: CLINIC | Age: 45
End: 2025-08-01
Payer: OTHER MISCELLANEOUS

## 2025-08-01 DIAGNOSIS — M62.81 MUSCLE WEAKNESS OF LEFT UPPER EXTREMITY: ICD-10-CM

## 2025-08-01 DIAGNOSIS — R29.898 WEAKNESS OF BOTH LOWER EXTREMITIES: ICD-10-CM

## 2025-08-01 DIAGNOSIS — Z74.09 IMPAIRED FUNCTIONAL MOBILITY, BALANCE, AND ENDURANCE: Primary | ICD-10-CM

## 2025-08-01 DIAGNOSIS — Z98.890 S/P CRANIOTOMY: ICD-10-CM

## 2025-08-01 DIAGNOSIS — R27.8 DECREASED COORDINATION: ICD-10-CM

## 2025-08-01 DIAGNOSIS — M25.512 ACUTE PAIN OF LEFT SHOULDER: ICD-10-CM

## 2025-08-01 NOTE — PROGRESS NOTES
Physical Therapy Daily Treatment Note  [facility] [address], [address], [place] [address]  P: [phone]   [phone]    Patient: Monika Gu   : 1980  Referring practitioner: Dillon Osorio MD  Date of Initial Visit: Type: THERAPY  Noted: 3/3/2025  Today's Date: 2025  Patient seen for 29 sessions      Visit Diagnoses:    ICD-10-CM ICD-9-CM   1. Impaired functional mobility, balance, and endurance  Z74.09 V49.89   2. S/P craniotomy  Z98.890 V45.89   3. Decreased coordination  R27.8 781.3   4. Weakness of both lower extremities  R29.898 729.89   5. Muscle weakness of left upper extremity  M62.81 728.87   6. Acute pain of left shoulder  M25.512 719.41       Subjective   Monika Gu reports she went to the doctor yesterday and they were pleased with her progress. Pt reports she was able to eat at a restaurant for the first time since surgery this week.       Objective     See Exercise, Manual, and Modality Logs for complete treatment.     Assessment/Plan    Continued to progress patient's strengthening and balance exercises and patient tolerated well, but continues to fatigue quickly with higher level exercises. Pt had more LOB with SL balance exercise at end of session secondary to fatigue. Pt was able to bike longer today with less recovery time afterwards. Will continue to progress patient as able.     Progress per Plan of Care and Progress strengthening /stabilization /functional activity            Timed:                 Manual Therapy:    0     mins  69205;     Therapeutic Exercise:    25     mins  40360;     Neuromuscular Go:    25    mins  85699;    Therapeutic Activity:     10     mins  57923;     Gait Trainin     mins  75185;     Ultrasound:     0     mins  94206;    Ionto                               0    mins   40260  Self pay                         0     mins [email or web address]    Un-Timed:  Electrical Stimulation:    0     mins   00908 ( )  Canalith Repos    0     mins 69049  Dry Needling     0     mins self-pay  Traction     0     mins 73904    Timed Treatment:   60   mins   Total Treatment:     60   mins    Adan Portillo PT  Physical Therapist    PT SIGNATURE: Electronically signed by Adan Portillo PT  [place] LICENSE: [id number]

## 2025-08-05 ENCOUNTER — TREATMENT (OUTPATIENT)
Dept: PHYSICAL THERAPY | Facility: CLINIC | Age: 45
End: 2025-08-05
Payer: OTHER MISCELLANEOUS

## 2025-08-05 DIAGNOSIS — Z98.890 S/P CRANIOTOMY: ICD-10-CM

## 2025-08-05 DIAGNOSIS — M62.81 MUSCLE WEAKNESS OF LEFT UPPER EXTREMITY: ICD-10-CM

## 2025-08-05 DIAGNOSIS — R27.8 DECREASED COORDINATION: ICD-10-CM

## 2025-08-05 DIAGNOSIS — R29.898 WEAKNESS OF BOTH LOWER EXTREMITIES: ICD-10-CM

## 2025-08-05 DIAGNOSIS — Z74.09 IMPAIRED FUNCTIONAL MOBILITY, BALANCE, AND ENDURANCE: Primary | ICD-10-CM

## 2025-08-05 PROCEDURE — 97530 THERAPEUTIC ACTIVITIES: CPT | Performed by: PHYSICAL THERAPIST

## 2025-08-05 PROCEDURE — 97112 NEUROMUSCULAR REEDUCATION: CPT | Performed by: PHYSICAL THERAPIST

## 2025-08-05 PROCEDURE — 97110 THERAPEUTIC EXERCISES: CPT | Performed by: PHYSICAL THERAPIST

## 2025-08-07 ENCOUNTER — TREATMENT (OUTPATIENT)
Dept: PHYSICAL THERAPY | Facility: CLINIC | Age: 45
End: 2025-08-07
Payer: OTHER MISCELLANEOUS

## 2025-08-07 DIAGNOSIS — M62.81 MUSCLE WEAKNESS OF LEFT UPPER EXTREMITY: ICD-10-CM

## 2025-08-07 DIAGNOSIS — Z74.09 IMPAIRED FUNCTIONAL MOBILITY, BALANCE, AND ENDURANCE: Primary | ICD-10-CM

## 2025-08-07 DIAGNOSIS — Z98.890 S/P CRANIOTOMY: ICD-10-CM

## 2025-08-07 DIAGNOSIS — R27.8 DECREASED COORDINATION: ICD-10-CM

## 2025-08-07 DIAGNOSIS — M25.512 ACUTE PAIN OF LEFT SHOULDER: ICD-10-CM

## 2025-08-07 DIAGNOSIS — R29.898 WEAKNESS OF BOTH LOWER EXTREMITIES: ICD-10-CM

## 2025-08-10 DIAGNOSIS — F07.81 POST CONCUSSIVE SYNDROME: ICD-10-CM

## 2025-08-12 ENCOUNTER — TREATMENT (OUTPATIENT)
Dept: PHYSICAL THERAPY | Facility: CLINIC | Age: 45
End: 2025-08-12
Payer: OTHER MISCELLANEOUS

## 2025-08-12 DIAGNOSIS — M25.512 ACUTE PAIN OF LEFT SHOULDER: ICD-10-CM

## 2025-08-12 DIAGNOSIS — M62.81 MUSCLE WEAKNESS OF LEFT UPPER EXTREMITY: ICD-10-CM

## 2025-08-12 DIAGNOSIS — Z74.09 IMPAIRED FUNCTIONAL MOBILITY, BALANCE, AND ENDURANCE: Primary | ICD-10-CM

## 2025-08-12 DIAGNOSIS — R27.8 DECREASED COORDINATION: ICD-10-CM

## 2025-08-12 DIAGNOSIS — R29.898 WEAKNESS OF BOTH LOWER EXTREMITIES: ICD-10-CM

## 2025-08-12 DIAGNOSIS — Z98.890 S/P CRANIOTOMY: ICD-10-CM

## 2025-08-12 RX ORDER — METOPROLOL SUCCINATE 50 MG/1
50 TABLET, EXTENDED RELEASE ORAL DAILY
Qty: 90 TABLET | Refills: 0 | Status: SHIPPED | OUTPATIENT
Start: 2025-08-12

## 2025-08-14 ENCOUNTER — TREATMENT (OUTPATIENT)
Dept: PHYSICAL THERAPY | Facility: CLINIC | Age: 45
End: 2025-08-14
Payer: OTHER MISCELLANEOUS

## 2025-08-14 DIAGNOSIS — Z98.890 S/P CRANIOTOMY: ICD-10-CM

## 2025-08-14 DIAGNOSIS — R27.8 DECREASED COORDINATION: ICD-10-CM

## 2025-08-14 DIAGNOSIS — R29.898 WEAKNESS OF BOTH LOWER EXTREMITIES: ICD-10-CM

## 2025-08-14 DIAGNOSIS — Z74.09 IMPAIRED FUNCTIONAL MOBILITY, BALANCE, AND ENDURANCE: Primary | ICD-10-CM

## 2025-08-14 DIAGNOSIS — M25.512 ACUTE PAIN OF LEFT SHOULDER: ICD-10-CM

## 2025-08-14 DIAGNOSIS — M62.81 MUSCLE WEAKNESS OF LEFT UPPER EXTREMITY: ICD-10-CM

## 2025-08-14 PROCEDURE — 97530 THERAPEUTIC ACTIVITIES: CPT | Performed by: PHYSICAL THERAPIST

## 2025-08-14 PROCEDURE — 97110 THERAPEUTIC EXERCISES: CPT | Performed by: PHYSICAL THERAPIST

## 2025-08-14 PROCEDURE — 97112 NEUROMUSCULAR REEDUCATION: CPT | Performed by: PHYSICAL THERAPIST

## 2025-08-19 ENCOUNTER — TREATMENT (OUTPATIENT)
Dept: PHYSICAL THERAPY | Facility: CLINIC | Age: 45
End: 2025-08-19
Payer: OTHER MISCELLANEOUS

## 2025-08-19 DIAGNOSIS — Z98.890 S/P CRANIOTOMY: ICD-10-CM

## 2025-08-19 DIAGNOSIS — R27.8 DECREASED COORDINATION: ICD-10-CM

## 2025-08-19 DIAGNOSIS — R29.898 WEAKNESS OF BOTH LOWER EXTREMITIES: ICD-10-CM

## 2025-08-19 DIAGNOSIS — M62.81 MUSCLE WEAKNESS OF LEFT UPPER EXTREMITY: ICD-10-CM

## 2025-08-19 DIAGNOSIS — M25.512 ACUTE PAIN OF LEFT SHOULDER: ICD-10-CM

## 2025-08-19 DIAGNOSIS — Z74.09 IMPAIRED FUNCTIONAL MOBILITY, BALANCE, AND ENDURANCE: Primary | ICD-10-CM

## 2025-08-19 PROCEDURE — 97140 MANUAL THERAPY 1/> REGIONS: CPT | Performed by: PHYSICAL THERAPIST

## 2025-08-25 ENCOUNTER — TREATMENT (OUTPATIENT)
Dept: PHYSICAL THERAPY | Facility: CLINIC | Age: 45
End: 2025-08-25
Payer: OTHER MISCELLANEOUS

## 2025-08-25 DIAGNOSIS — R29.898 WEAKNESS OF BOTH LOWER EXTREMITIES: ICD-10-CM

## 2025-08-25 DIAGNOSIS — Z74.09 IMPAIRED FUNCTIONAL MOBILITY, BALANCE, AND ENDURANCE: Primary | ICD-10-CM

## 2025-08-25 DIAGNOSIS — R27.8 DECREASED COORDINATION: ICD-10-CM

## 2025-08-25 DIAGNOSIS — Z98.890 S/P CRANIOTOMY: ICD-10-CM

## 2025-08-25 DIAGNOSIS — M25.512 ACUTE PAIN OF LEFT SHOULDER: ICD-10-CM

## 2025-08-25 DIAGNOSIS — M62.81 MUSCLE WEAKNESS OF LEFT UPPER EXTREMITY: ICD-10-CM

## 2025-08-27 ENCOUNTER — TELEMEDICINE (OUTPATIENT)
Dept: PSYCHIATRY | Facility: CLINIC | Age: 45
End: 2025-08-27
Payer: COMMERCIAL

## 2025-08-27 ENCOUNTER — PRIOR AUTHORIZATION (OUTPATIENT)
Dept: PSYCHIATRY | Facility: CLINIC | Age: 45
End: 2025-08-27

## 2025-08-27 DIAGNOSIS — F41.0 PANIC ANXIETY SYNDROME: ICD-10-CM

## 2025-08-27 DIAGNOSIS — F32.9 REACTIVE DEPRESSION: Primary | ICD-10-CM

## 2025-08-27 DIAGNOSIS — Z79.899 MEDICATION MANAGEMENT: ICD-10-CM

## 2025-08-27 DIAGNOSIS — G47.9 SLEEP DISTURBANCES: ICD-10-CM

## 2025-08-27 RX ORDER — DESVENLAFAXINE 25 MG/1
25 TABLET, EXTENDED RELEASE ORAL DAILY
Qty: 30 TABLET | Refills: 0 | Status: SHIPPED | OUTPATIENT
Start: 2025-08-27

## 2025-08-27 RX ORDER — AMANTADINE HYDROCHLORIDE 100 MG/1
100 CAPSULE, GELATIN COATED ORAL
COMMUNITY
Start: 2025-07-31 | End: 2026-07-31

## 2025-08-28 ENCOUNTER — TREATMENT (OUTPATIENT)
Dept: PHYSICAL THERAPY | Facility: CLINIC | Age: 45
End: 2025-08-28
Payer: OTHER MISCELLANEOUS

## 2025-08-28 DIAGNOSIS — M62.81 MUSCLE WEAKNESS OF LEFT UPPER EXTREMITY: ICD-10-CM

## 2025-08-28 DIAGNOSIS — Z74.09 IMPAIRED FUNCTIONAL MOBILITY, BALANCE, AND ENDURANCE: Primary | ICD-10-CM

## 2025-08-28 DIAGNOSIS — R27.8 DECREASED COORDINATION: ICD-10-CM

## 2025-08-28 DIAGNOSIS — Z98.890 S/P CRANIOTOMY: ICD-10-CM

## 2025-08-28 DIAGNOSIS — R29.898 WEAKNESS OF BOTH LOWER EXTREMITIES: ICD-10-CM

## 2025-08-28 DIAGNOSIS — M25.512 ACUTE PAIN OF LEFT SHOULDER: ICD-10-CM
